# Patient Record
Sex: FEMALE | Race: WHITE | NOT HISPANIC OR LATINO | Employment: FULL TIME | URBAN - METROPOLITAN AREA
[De-identification: names, ages, dates, MRNs, and addresses within clinical notes are randomized per-mention and may not be internally consistent; named-entity substitution may affect disease eponyms.]

---

## 2017-02-21 ENCOUNTER — GENERIC CONVERSION - ENCOUNTER (OUTPATIENT)
Dept: OTHER | Facility: OTHER | Age: 67
End: 2017-02-21

## 2017-06-20 ENCOUNTER — ALLSCRIPTS OFFICE VISIT (OUTPATIENT)
Dept: OTHER | Facility: OTHER | Age: 67
End: 2017-06-20

## 2017-06-20 ENCOUNTER — APPOINTMENT (OUTPATIENT)
Dept: LAB | Facility: CLINIC | Age: 67
End: 2017-06-20
Payer: COMMERCIAL

## 2017-06-20 DIAGNOSIS — E78.5 HYPERLIPIDEMIA: ICD-10-CM

## 2017-06-20 DIAGNOSIS — R74.01 NONSPECIFIC ELEVATION OF LEVELS OF TRANSAMINASE AND LACTIC ACID DEHYDROGENASE (LDH): ICD-10-CM

## 2017-06-20 DIAGNOSIS — E83.52 HYPERCALCEMIA: ICD-10-CM

## 2017-06-20 DIAGNOSIS — D47.2 MONOCLONAL GAMMOPATHY: ICD-10-CM

## 2017-06-20 DIAGNOSIS — R74.02 NONSPECIFIC ELEVATION OF LEVELS OF TRANSAMINASE AND LACTIC ACID DEHYDROGENASE (LDH): ICD-10-CM

## 2017-06-20 LAB
ALBUMIN SERPL BCP-MCNC: 4 G/DL (ref 3.5–5)
ALP SERPL-CCNC: 46 U/L (ref 46–116)
ALT SERPL W P-5'-P-CCNC: 31 U/L (ref 12–78)
ANION GAP SERPL CALCULATED.3IONS-SCNC: 5 MMOL/L (ref 4–13)
AST SERPL W P-5'-P-CCNC: 52 U/L (ref 5–45)
BASOPHILS # BLD AUTO: 0.02 THOUSANDS/ΜL (ref 0–0.1)
BASOPHILS NFR BLD AUTO: 0 % (ref 0–1)
BILIRUB SERPL-MCNC: 0.44 MG/DL (ref 0.2–1)
BILIRUB UR QL STRIP: NEGATIVE
BUN SERPL-MCNC: 15 MG/DL (ref 5–25)
CALCIUM ALBUM COR SERPL-MCNC: 10.3 MG/DL (ref 8.3–10.1)
CALCIUM SERPL-MCNC: 10.3 MG/DL (ref 8.3–10.1)
CHLORIDE SERPL-SCNC: 105 MMOL/L (ref 100–108)
CHOLEST SERPL-MCNC: 186 MG/DL (ref 50–200)
CLARITY UR: NORMAL
CO2 SERPL-SCNC: 33 MMOL/L (ref 21–32)
COLOR UR: YELLOW
CREAT SERPL-MCNC: 0.7 MG/DL (ref 0.6–1.3)
EOSINOPHIL # BLD AUTO: 0.08 THOUSAND/ΜL (ref 0–0.61)
EOSINOPHIL NFR BLD AUTO: 2 % (ref 0–6)
ERYTHROCYTE [DISTWIDTH] IN BLOOD BY AUTOMATED COUNT: 12.1 % (ref 11.6–15.1)
GFR SERPL CREATININE-BSD FRML MDRD: >60 ML/MIN/1.73SQ M
GLUCOSE SERPL-MCNC: 97 MG/DL (ref 65–140)
GLUCOSE UR STRIP-MCNC: NEGATIVE MG/DL
HCT VFR BLD AUTO: 40.6 % (ref 34.8–46.1)
HDLC SERPL-MCNC: 100 MG/DL (ref 40–60)
HGB BLD-MCNC: 13.6 G/DL (ref 11.5–15.4)
HGB UR QL STRIP.AUTO: NEGATIVE
KETONES UR STRIP-MCNC: NEGATIVE MG/DL
LEUKOCYTE ESTERASE UR QL STRIP: NEGATIVE
LYMPHOCYTES # BLD AUTO: 2.12 THOUSANDS/ΜL (ref 0.6–4.47)
LYMPHOCYTES NFR BLD AUTO: 40 % (ref 14–44)
MCH RBC QN AUTO: 31.5 PG (ref 26.8–34.3)
MCHC RBC AUTO-ENTMCNC: 33.5 G/DL (ref 31.4–37.4)
MCV RBC AUTO: 94 FL (ref 82–98)
MONOCYTES # BLD AUTO: 0.38 THOUSAND/ΜL (ref 0.17–1.22)
MONOCYTES NFR BLD AUTO: 7 % (ref 4–12)
NEUTROPHILS # BLD AUTO: 2.72 THOUSANDS/ΜL (ref 1.85–7.62)
NEUTS SEG NFR BLD AUTO: 51 % (ref 43–75)
NITRITE UR QL STRIP: NEGATIVE
NONHDLC SERPL-MCNC: 86 MG/DL
NRBC BLD AUTO-RTO: 0 /100 WBCS
PH UR STRIP.AUTO: 7 [PH] (ref 4.5–8)
PLATELET # BLD AUTO: 273 THOUSANDS/UL (ref 149–390)
PMV BLD AUTO: 10.8 FL (ref 8.9–12.7)
POTASSIUM SERPL-SCNC: 3.6 MMOL/L (ref 3.5–5.3)
PROT SERPL-MCNC: 7.5 G/DL (ref 6.4–8.2)
PROT UR STRIP-MCNC: NEGATIVE MG/DL
RBC # BLD AUTO: 4.32 MILLION/UL (ref 3.81–5.12)
SODIUM SERPL-SCNC: 143 MMOL/L (ref 136–145)
SP GR UR STRIP.AUTO: 1.01 (ref 1–1.03)
TSH SERPL DL<=0.05 MIU/L-ACNC: 1.5 UIU/ML (ref 0.36–3.74)
UROBILINOGEN UR QL STRIP.AUTO: 0.2 E.U./DL
WBC # BLD AUTO: 5.32 THOUSAND/UL (ref 4.31–10.16)

## 2017-06-20 PROCEDURE — 84443 ASSAY THYROID STIM HORMONE: CPT

## 2017-06-20 PROCEDURE — 82465 ASSAY BLD/SERUM CHOLESTEROL: CPT

## 2017-06-20 PROCEDURE — 85025 COMPLETE CBC W/AUTO DIFF WBC: CPT

## 2017-06-20 PROCEDURE — 36415 COLL VENOUS BLD VENIPUNCTURE: CPT

## 2017-06-20 PROCEDURE — 83718 ASSAY OF LIPOPROTEIN: CPT

## 2017-06-20 PROCEDURE — 81003 URINALYSIS AUTO W/O SCOPE: CPT

## 2017-06-20 PROCEDURE — 80053 COMPREHEN METABOLIC PANEL: CPT

## 2017-06-23 ENCOUNTER — HOSPITAL ENCOUNTER (OUTPATIENT)
Dept: ULTRASOUND IMAGING | Facility: CLINIC | Age: 67
Discharge: HOME | End: 2017-06-23
Payer: COMMERCIAL

## 2017-06-23 DIAGNOSIS — R74.01 NONSPECIFIC ELEVATION OF LEVELS OF TRANSAMINASE AND LACTIC ACID DEHYDROGENASE (LDH): ICD-10-CM

## 2017-06-23 DIAGNOSIS — R74.02 NONSPECIFIC ELEVATION OF LEVELS OF TRANSAMINASE AND LACTIC ACID DEHYDROGENASE (LDH): ICD-10-CM

## 2017-06-23 PROCEDURE — 76700 US EXAM ABDOM COMPLETE: CPT

## 2017-06-27 ENCOUNTER — GENERIC CONVERSION - ENCOUNTER (OUTPATIENT)
Dept: OTHER | Facility: OTHER | Age: 67
End: 2017-06-27

## 2017-07-07 ENCOUNTER — ALLSCRIPTS OFFICE VISIT (OUTPATIENT)
Dept: OTHER | Facility: OTHER | Age: 67
End: 2017-07-07

## 2017-07-07 ENCOUNTER — TRANSCRIBE ORDERS (OUTPATIENT)
Dept: LAB | Facility: CLINIC | Age: 67
End: 2017-07-07

## 2017-07-07 ENCOUNTER — APPOINTMENT (OUTPATIENT)
Dept: LAB | Facility: CLINIC | Age: 67
End: 2017-07-07
Payer: COMMERCIAL

## 2017-07-07 DIAGNOSIS — R74.02 NONSPECIFIC ELEVATION OF LEVELS OF TRANSAMINASE AND LACTIC ACID DEHYDROGENASE (LDH): ICD-10-CM

## 2017-07-07 DIAGNOSIS — R74.01 NONSPECIFIC ELEVATION OF LEVELS OF TRANSAMINASE AND LACTIC ACID DEHYDROGENASE (LDH): ICD-10-CM

## 2017-07-07 DIAGNOSIS — E83.52 HYPERCALCEMIA: ICD-10-CM

## 2017-07-07 LAB
ALBUMIN SERPL BCP-MCNC: 3.7 G/DL (ref 3.5–5)
ALP SERPL-CCNC: 49 U/L (ref 46–116)
ALT SERPL W P-5'-P-CCNC: 30 U/L (ref 12–78)
ANION GAP SERPL CALCULATED.3IONS-SCNC: 3 MMOL/L (ref 4–13)
AST SERPL W P-5'-P-CCNC: 50 U/L (ref 5–45)
BILIRUB SERPL-MCNC: 0.36 MG/DL (ref 0.2–1)
BUN SERPL-MCNC: 13 MG/DL (ref 5–25)
CA-I BLD-SCNC: 1.25 MMOL/L (ref 1.12–1.32)
CALCIUM SERPL-MCNC: 9.5 MG/DL (ref 8.3–10.1)
CHLORIDE SERPL-SCNC: 105 MMOL/L (ref 100–108)
CO2 SERPL-SCNC: 32 MMOL/L (ref 21–32)
CREAT SERPL-MCNC: 0.79 MG/DL (ref 0.6–1.3)
ERYTHROCYTE [SEDIMENTATION RATE] IN BLOOD: 23 MM/HOUR (ref 0–20)
FERRITIN SERPL-MCNC: 44 NG/ML (ref 8–388)
GFR SERPL CREATININE-BSD FRML MDRD: >60 ML/MIN/1.73SQ M
GLUCOSE P FAST SERPL-MCNC: 98 MG/DL (ref 65–99)
IGA SERPL-MCNC: 199 MG/DL (ref 70–400)
IGG SERPL-MCNC: 978 MG/DL (ref 700–1600)
IGM SERPL-MCNC: 88 MG/DL (ref 40–230)
POTASSIUM SERPL-SCNC: 4.1 MMOL/L (ref 3.5–5.3)
PROT SERPL-MCNC: 7 G/DL (ref 6.4–8.2)
PTH-INTACT SERPL-MCNC: 9.1 PG/ML (ref 14–72)
SODIUM SERPL-SCNC: 140 MMOL/L (ref 136–145)

## 2017-07-07 PROCEDURE — 82330 ASSAY OF CALCIUM: CPT

## 2017-07-07 PROCEDURE — 87340 HEPATITIS B SURFACE AG IA: CPT

## 2017-07-07 PROCEDURE — 80053 COMPREHEN METABOLIC PANEL: CPT

## 2017-07-07 PROCEDURE — 86803 HEPATITIS C AB TEST: CPT

## 2017-07-07 PROCEDURE — 82728 ASSAY OF FERRITIN: CPT

## 2017-07-07 PROCEDURE — 82784 ASSAY IGA/IGD/IGG/IGM EACH: CPT

## 2017-07-07 PROCEDURE — 82390 ASSAY OF CERULOPLASMIN: CPT

## 2017-07-07 PROCEDURE — 36415 COLL VENOUS BLD VENIPUNCTURE: CPT

## 2017-07-07 PROCEDURE — 85652 RBC SED RATE AUTOMATED: CPT

## 2017-07-07 PROCEDURE — 83970 ASSAY OF PARATHORMONE: CPT

## 2017-07-07 PROCEDURE — 86334 IMMUNOFIX E-PHORESIS SERUM: CPT

## 2017-07-07 PROCEDURE — 84165 PROTEIN E-PHORESIS SERUM: CPT

## 2017-07-09 LAB
CERULOPLASMIN SERPL-MCNC: 22.3 MG/DL (ref 19–39)
HBV SURFACE AG SER QL: NORMAL
HCV AB SER QL: NORMAL

## 2017-07-10 LAB
ALBUMIN SERPL ELPH-MCNC: 4.24 G/DL (ref 3.5–5)
ALBUMIN SERPL ELPH-MCNC: 61.5 % (ref 52–65)
ALPHA1 GLOB SERPL ELPH-MCNC: 0.31 G/DL (ref 0.1–0.4)
ALPHA1 GLOB SERPL ELPH-MCNC: 4.5 % (ref 2.5–5)
ALPHA2 GLOB SERPL ELPH-MCNC: 0.68 G/DL (ref 0.4–1.2)
ALPHA2 GLOB SERPL ELPH-MCNC: 9.9 % (ref 7–13)
BETA GLOB ABNORMAL SERPL ELPH-MCNC: 0.36 G/DL (ref 0.4–0.8)
BETA1 GLOB SERPL ELPH-MCNC: 5.2 % (ref 5–13)
BETA2 GLOB SERPL ELPH-MCNC: 5.6 % (ref 2–8)
BETA2+GAMMA GLOB SERPL ELPH-MCNC: 0.39 G/DL (ref 0.2–0.5)
GAMMA GLOB ABNORMAL SERPL ELPH-MCNC: 0.92 G/DL (ref 0.5–1.6)
GAMMA GLOB SERPL ELPH-MCNC: 13.3 % (ref 12–22)
IGG/ALB SER: 1.6 {RATIO} (ref 1.1–1.8)
INTERPRETATION UR IFE-IMP: NORMAL
M PROTEIN 1 MFR SERPL ELPH: 2.6 %
M PROTEIN 1 SERPL ELPH-MCNC: 0.18 G/DL
PROT SERPL-MCNC: 6.9 G/DL (ref 6.4–8.2)

## 2017-07-14 ENCOUNTER — ALLSCRIPTS OFFICE VISIT (OUTPATIENT)
Dept: OTHER | Facility: OTHER | Age: 67
End: 2017-07-14

## 2017-07-17 ENCOUNTER — TRANSCRIBE ORDERS (OUTPATIENT)
Dept: ADMINISTRATIVE | Facility: HOSPITAL | Age: 67
End: 2017-07-17

## 2017-07-17 ENCOUNTER — HOSPITAL ENCOUNTER (OUTPATIENT)
Dept: RADIOLOGY | Facility: HOSPITAL | Age: 67
Discharge: HOME | End: 2017-07-17
Payer: COMMERCIAL

## 2017-07-17 DIAGNOSIS — D47.2 MONOCLONAL GAMMOPATHY: ICD-10-CM

## 2017-07-17 PROCEDURE — 77075 RADEX OSSEOUS SURVEY COMPL: CPT

## 2017-07-21 ENCOUNTER — HOSPITAL ENCOUNTER (OUTPATIENT)
Dept: RADIOLOGY | Facility: HOSPITAL | Age: 67
Discharge: HOME | End: 2017-07-21
Payer: COMMERCIAL

## 2017-07-21 DIAGNOSIS — D47.2 MONOCLONAL GAMMOPATHY: ICD-10-CM

## 2017-07-21 PROCEDURE — 78306 BONE IMAGING WHOLE BODY: CPT

## 2017-07-21 PROCEDURE — A9503 TC99M MEDRONATE: HCPCS

## 2017-07-31 ENCOUNTER — ALLSCRIPTS OFFICE VISIT (OUTPATIENT)
Dept: OTHER | Facility: OTHER | Age: 67
End: 2017-07-31

## 2017-08-04 ENCOUNTER — ALLSCRIPTS OFFICE VISIT (OUTPATIENT)
Dept: OTHER | Facility: OTHER | Age: 67
End: 2017-08-04

## 2017-08-16 ENCOUNTER — ALLSCRIPTS OFFICE VISIT (OUTPATIENT)
Dept: OTHER | Facility: OTHER | Age: 67
End: 2017-08-16

## 2018-01-12 VITALS
WEIGHT: 102 LBS | OXYGEN SATURATION: 97 % | DIASTOLIC BLOOD PRESSURE: 64 MMHG | SYSTOLIC BLOOD PRESSURE: 110 MMHG | HEIGHT: 63 IN | TEMPERATURE: 98.2 F | HEART RATE: 66 BPM | BODY MASS INDEX: 18.07 KG/M2

## 2018-01-12 VITALS
WEIGHT: 104.5 LBS | SYSTOLIC BLOOD PRESSURE: 112 MMHG | BODY MASS INDEX: 18.52 KG/M2 | HEIGHT: 63 IN | HEART RATE: 80 BPM | DIASTOLIC BLOOD PRESSURE: 70 MMHG | OXYGEN SATURATION: 97 %

## 2018-01-14 VITALS
DIASTOLIC BLOOD PRESSURE: 78 MMHG | WEIGHT: 102 LBS | HEIGHT: 61 IN | BODY MASS INDEX: 19.26 KG/M2 | HEART RATE: 63 BPM | SYSTOLIC BLOOD PRESSURE: 118 MMHG | TEMPERATURE: 98.2 F | OXYGEN SATURATION: 97 % | RESPIRATION RATE: 14 BRPM

## 2018-01-14 VITALS
BODY MASS INDEX: 18.52 KG/M2 | WEIGHT: 104.5 LBS | HEIGHT: 63 IN | OXYGEN SATURATION: 96 % | SYSTOLIC BLOOD PRESSURE: 110 MMHG | DIASTOLIC BLOOD PRESSURE: 74 MMHG | HEART RATE: 65 BPM

## 2018-01-16 NOTE — PROGRESS NOTES
Assessment    1  Encounter for preventive health examination (V70 0) (Z00 00)    Plan  Hyperlipidemia    · (1) CBC/PLT/DIFF; Status:Active; Requested SRZ:[id number];    · (1) COMPREHENSIVE METABOLIC PANEL; Status:Active; Requested EGI:[id number];    · (1) LIPID PANEL, NON FASTING W/O TRIG; Status:Active; Requested VHV:[id number];    · (1) TSH WITH FT4 REFLEX; Status:Active; Requested XGF:[id number];    · (1) URINALYSIS (will reflex a microscopy if leukocytes, occult blood, protein or nitrites  are not within normal limits); Status:Active; Requested HGT:[id number];     Discussion/Summary  health maintenance visit Currently, she eats a healthy diet  cervical cancer screening is needed every year Breast cancer screening: mammogram is needed every two years  Colorectal cancer screening: colorectal cancer screening is current  Osteoporosis screening: bone mineral density testing is current  Screening lab work includes hemoglobin, glucose, lipid profile and thyroid function testing  The risks and benefits of immunizations were discussed  Problem appears stable    Laboratory testing today  Followup in a year  Dexa to be done 11/2017  History of Present Illness  HM, Adult Female: The patient is being seen for a health maintenance evaluation  The last health maintenance visit was 3 year(s) ago  Social History: Household members include spouse and 1 daughter(s)  She is   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  The patient has never smoked cigarettes  She reports rare alcohol use  She has never used illicit drugs  General Health: The patient's health since the last visit is described as good  Lifestyle:  She consumes a diverse and healthy diet  She does not have any weight concerns  She exercises regularly  She does not use tobacco  She denies alcohol use  She denies drug use     Reproductive health: the patient is postmenopausal    Screening: cancer screening reviewed and updated  metabolic screening reviewed and updated  risk screening reviewed and updated  HPI: Chronic problems include osteoarthritis, osteoporosis, and hyperlipidemia  A minimal 15 year history of intermittent right occipital headache radiating to the temple   Occurring more than 10 times a month  A headache specialist suggested Amitryptiline preventive but it makes her groggy  Osteoporosis is treated by Dr Cheryl Sandoval; she gets a Boniva injection   Osteoarthritic pain is at a stable baseline       Osteoporosis (Follow-Up): The patient's osteoporosis has been stable since the last visit  She has no comorbid illnesses  She has no complications  She has no significant interval events  Symptoms: The patient is currently asymptomatic  Associated symptoms include no decrease in height  Home precautions: Osteoporosis counseling and education was reviewed with patient and caregivers, including prevention of falls  Medications: the patient is adherent with her medication regimen  Medication(s): vitamin D and PTH therapy  Hyperlipidemia (Follow-Up): The patient states her hyperlipidemia has been stable since the last visit  She has no comorbid illnesses  Symptoms: The patient is currently asymptomatic  Medications: The patient is not currently on any medications for her hyperlipidemia  Arthritis / Joint Pain (Brief): The patient is being seen for a routine clinic follow-up of joint pain  The patient presents with complaints of gradual onset of frequent episodes of moderate bilateral knee joint pain  Episodes started about 5 years ago  Her symptoms are caused by no known event  Symptoms are made worse by direct pressure and weight bearing  Symptoms are unchanged  Pertinent Medical History: osteoporosis and osteoarthritis  Headache (Brief): The patient is being seen for a routine clinic follow-up of headaches     The patient presents with complaints of sudden onset of episodes of moderate always unilateral and right occipital headache, radiating to the right temporal area  Episodes have been occurring 4-5 times a month starting about 10 years ago, each episode lasting 1 hour  Her symptoms are caused by no known event  Symptoms are improved by rest and darkened room  Symptoms are unchanged  Review of Systems    Constitutional: no fever and no chills  Eyes: no eyesight problems  ENT: no nosebleeds and no nasal discharge  Cardiovascular: no chest pain and no palpitations  Respiratory: no shortness of breath and no cough  Gastrointestinal: no abdominal pain, no nausea, no vomiting and no diarrhea  Musculoskeletal: arthralgias and joint stiffness  Integumentary: no rashes and no itching  Neurological: as noted in HPI  Psychiatric: no anxiety and no depression  Endocrine: no muscle weakness  Hematologic/Lymphatic: no tendency for easy bleeding and no tendency for easy bruising  Active Problems    1  Arthritis (716 90) (M19 90)   2  Hyperlipidemia (272 4) (E78 5)   3  Migraine (346 90) (G43 909)   4  Osteoporosis (733 00) (M81 0)    Past Medical History    The active problems and past medical history were reviewed and updated today  Surgical History    · History of Complete Colonoscopy    The surgical history was reviewed and updated today  Family History  Family History    · No pertinent family history    Social History    ·    · Minimum alcohol consumption   · Never a smoker   · No drug use    Current Meds   1  Aspirin 81 MG TABS; Therapy: ([phone]) to Recorded   2  Atorvastatin Calcium 20 MG Oral Tablet; Take 1 tablet daily; Therapy: [id number] to (Evaluate:14Apr2017)  Requested for: [id number]; Last   Rx:19Apr2016; Status: ACTIVE - Renewal Denied Ordered   3  Calcium 600-D TABS; Therapy: (105 185 272) to Recorded   4  Multi-Vitamins TABS; Therapy: ([phone]) to Recorded   5   SUMAtriptan Succinate 50 MG Oral Tablet; One tab by mouth stat at the onset of a   headache; Therapy: [id number] to (Evaluate:18Jun2015)  Requested for: 84QGX6188; Last   Rx:08Jun2015 Ordered   6  Zoledronic Acid 5 MG/100ML Intravenous Solution; INFUSE 5MG INTRAVENOUSLY   OVER 15 MINUTES YEARLY; Therapy: [id number] to (Evaluate:01Jan2015); Last Rx:[id number] Ordered    Allergies    1  No Known Drug Allergies    2  Dust   3  Pollen   4  Seasonal    Vitals   Recorded: 20Jun2017 01:03PM   Heart Rate 60   Systolic 372   Diastolic 80   Height 5 ft 3 in   Weight 104 lb    BMI Calculated 18 42   BSA Calculated 1 46   O2 Saturation 98     Physical Exam    Constitutional   General appearance: No acute distress, well appearing and well nourished  Eyes   Conjunctiva and lids: No swelling, erythema or discharge  Pupils and irises: Equal, round and reactive to light  Ears, Nose, Mouth, and Throat   External inspection of ears and nose: Normal     Otoscopic examination: Tympanic membranes translucent with normal light reflex  Canals patent without erythema  Nasal mucosa, septum, and turbinates: Normal without edema or erythema  Oropharynx: Normal with no erythema, edema, exudate or lesions  Pulmonary   Respiratory effort: No increased work of breathing or signs of respiratory distress  Auscultation of lungs: Clear to auscultation  Cardiovascular   Auscultation of heart: Normal rate and rhythm, normal S1 and S2, without murmurs  Examination of extremities for edema and/or varicosities: Normal     Carotid pulses: Normal     Abdomen   Abdomen: Non-tender, no masses  Liver and spleen: No hepatomegaly or splenomegaly  Lymphatic   Palpation of lymph nodes in neck: No lymphadenopathy  Musculoskeletal   Gait and station: Normal     Digits and nails: Normal without clubbing or cyanosis  Inspection/palpation of joints, bones, and muscles: Normal     Skin   Skin and subcutaneous tissue: Normal without rashes or lesions  Neurologic   Cranial nerves: Cranial nerves 2-12 intact  Reflexes: 2+ and symmetric  Sensation: No sensory loss  Psychiatric   Orientation to person, place, and time: Normal     Mood and affect: Normal          Results/Data  PHQ-2 Adult Depression Screening 20Jun2017 01:14PM Carmen Horton     Test Name Result Flag Reference   PHQ-2 Adult Depression Score 0     Over the last two weeks, how often have you been bothered by any of the following problems?   Little interest or pleasure in doing things: Not at all - 0  Feeling down, depressed, or hopeless: Not at all - 0   PHQ-2 Adult Depression Screening Negative         Signatures   Electronically signed by : KATHY Jones ; Jun 20 2017  1:20PM EST                       (Author)

## 2018-01-18 NOTE — RESULT NOTES
Verified Results  * US ABDOMEN COMPLETE [id number] 09:55AM Jessica Rangel Order Number: [id number]    - Patient Instructions: To schedule this appointment, please contact Central Scheduling at [phone]  Test Name Result Flag Reference   US ABDOMEN COMPLETE (Report)     ABDOMEN ULTRASOUND, COMPLETE     INDICATION: Elevated transaminase and LDH  COMPARISON: None  TECHNIQUE:  Real-time ultrasound of the abdomen was performed with a curvilinear transducer with both volumetric sweeps and still imaging techniques  FINDINGS:     PANCREAS: Visualized portions of the pancreas are within normal limits  AORTA AND IVC: Visualized portions are normal for patient age  LIVER:   Size: Within normal range  The liver measures 17 cm in the midclavicular line  Contour: Surface contour is smooth  Parenchyma: Background parenchymal echotexture is normal    Multiple simple cysts scattered throughout both lobes of liver, the largest measuring 2 2 cm  Some of the cysts are thinly septated  No concerning hepatic masses  The main portal vein is patent and hepatopetal       BILIARY:   The gallbladder is normal in caliber  No wall thickening or pericholecystic fluid  No stones or sludge identified  Sonographic Brenna Getting sign is negative  No intrahepatic biliary dilatation  CBD measures 3 mm  No choledocholithiasis  KIDNEY:    Right kidney measures 10 6 cm  Within normal limits  Left kidney measures 9 8 cm  Within normal limits  SPLEEN:    Measures 9 7 cm  Within normal limits  ASCITES: None  IMPRESSION:     Small hepatic cysts without concerning hepatic masses  Otherwise, normal abdominal ultrasound         Workstation performed: [id number]     Signed by:   Jasen Sarkar MD   6/27/17

## 2018-01-22 VITALS
HEIGHT: 63 IN | DIASTOLIC BLOOD PRESSURE: 80 MMHG | BODY MASS INDEX: 18.43 KG/M2 | SYSTOLIC BLOOD PRESSURE: 124 MMHG | HEART RATE: 60 BPM | WEIGHT: 104 LBS | OXYGEN SATURATION: 98 %

## 2018-01-24 ENCOUNTER — TELEPHONE (OUTPATIENT)
Dept: INTERNAL MEDICINE CLINIC | Facility: CLINIC | Age: 68
End: 2018-01-24

## 2018-02-02 DIAGNOSIS — D47.2 MONOCLONAL GAMMOPATHY: ICD-10-CM

## 2018-03-21 ENCOUNTER — APPOINTMENT (OUTPATIENT)
Dept: LAB | Facility: CLINIC | Age: 68
End: 2018-03-21
Payer: COMMERCIAL

## 2018-03-21 DIAGNOSIS — D47.2 MONOCLONAL GAMMOPATHY: ICD-10-CM

## 2018-03-21 LAB
ALBUMIN SERPL BCP-MCNC: 3.8 G/DL (ref 3.5–5)
ALP SERPL-CCNC: 54 U/L (ref 46–116)
ALT SERPL W P-5'-P-CCNC: 53 U/L (ref 12–78)
ANION GAP SERPL CALCULATED.3IONS-SCNC: 3 MMOL/L (ref 4–13)
AST SERPL W P-5'-P-CCNC: 71 U/L (ref 5–45)
BASOPHILS # BLD AUTO: 0.03 THOUSANDS/ΜL (ref 0–0.1)
BASOPHILS NFR BLD AUTO: 1 % (ref 0–1)
BILIRUB SERPL-MCNC: 0.55 MG/DL (ref 0.2–1)
BUN SERPL-MCNC: 14 MG/DL (ref 5–25)
CALCIUM SERPL-MCNC: 9.2 MG/DL (ref 8.3–10.1)
CHLORIDE SERPL-SCNC: 104 MMOL/L (ref 100–108)
CO2 SERPL-SCNC: 33 MMOL/L (ref 21–32)
CREAT SERPL-MCNC: 0.85 MG/DL (ref 0.6–1.3)
EOSINOPHIL # BLD AUTO: 0.18 THOUSAND/ΜL (ref 0–0.61)
EOSINOPHIL NFR BLD AUTO: 4 % (ref 0–6)
ERYTHROCYTE [DISTWIDTH] IN BLOOD BY AUTOMATED COUNT: 11.7 % (ref 11.6–15.1)
GFR SERPL CREATININE-BSD FRML MDRD: 72 ML/MIN/1.73SQ M
GLUCOSE P FAST SERPL-MCNC: 79 MG/DL (ref 65–99)
HCT VFR BLD AUTO: 41.6 % (ref 34.8–46.1)
HGB BLD-MCNC: 14.1 G/DL (ref 11.5–15.4)
IGA SERPL-MCNC: 204 MG/DL (ref 70–400)
IGG SERPL-MCNC: 1030 MG/DL (ref 700–1600)
IGM SERPL-MCNC: 94 MG/DL (ref 40–230)
LYMPHOCYTES # BLD AUTO: 1.92 THOUSANDS/ΜL (ref 0.6–4.47)
LYMPHOCYTES NFR BLD AUTO: 39 % (ref 14–44)
MCH RBC QN AUTO: 32 PG (ref 26.8–34.3)
MCHC RBC AUTO-ENTMCNC: 33.9 G/DL (ref 31.4–37.4)
MCV RBC AUTO: 94 FL (ref 82–98)
MONOCYTES # BLD AUTO: 0.39 THOUSAND/ΜL (ref 0.17–1.22)
MONOCYTES NFR BLD AUTO: 8 % (ref 4–12)
NEUTROPHILS # BLD AUTO: 2.35 THOUSANDS/ΜL (ref 1.85–7.62)
NEUTS SEG NFR BLD AUTO: 48 % (ref 43–75)
NRBC BLD AUTO-RTO: 0 /100 WBCS
PLATELET # BLD AUTO: 274 THOUSANDS/UL (ref 149–390)
PMV BLD AUTO: 10.5 FL (ref 8.9–12.7)
POTASSIUM SERPL-SCNC: 4.2 MMOL/L (ref 3.5–5.3)
PROT SERPL-MCNC: 7.5 G/DL (ref 6.4–8.2)
RBC # BLD AUTO: 4.41 MILLION/UL (ref 3.81–5.12)
SODIUM SERPL-SCNC: 140 MMOL/L (ref 136–145)
WBC # BLD AUTO: 4.87 THOUSAND/UL (ref 4.31–10.16)

## 2018-03-21 PROCEDURE — 85025 COMPLETE CBC W/AUTO DIFF WBC: CPT

## 2018-03-21 PROCEDURE — 82784 ASSAY IGA/IGD/IGG/IGM EACH: CPT

## 2018-03-21 PROCEDURE — 36415 COLL VENOUS BLD VENIPUNCTURE: CPT

## 2018-03-21 PROCEDURE — 84165 PROTEIN E-PHORESIS SERUM: CPT

## 2018-03-21 PROCEDURE — 80053 COMPREHEN METABOLIC PANEL: CPT

## 2018-03-21 PROCEDURE — 83883 ASSAY NEPHELOMETRY NOT SPEC: CPT

## 2018-03-21 PROCEDURE — 84165 PROTEIN E-PHORESIS SERUM: CPT | Performed by: PATHOLOGY

## 2018-03-22 LAB
KAPPA LC FREE SER-MCNC: 14.4 MG/L (ref 3.3–19.4)
KAPPA LC FREE/LAMBDA FREE SER: 1.04 {RATIO} (ref 0.26–1.65)
LAMBDA LC FREE SERPL-MCNC: 13.8 MG/L (ref 5.7–26.3)

## 2018-03-24 LAB
ALBUMIN SERPL ELPH-MCNC: 4.36 G/DL (ref 3.5–5)
ALBUMIN SERPL ELPH-MCNC: 61.4 % (ref 52–65)
ALPHA1 GLOB SERPL ELPH-MCNC: 0.28 G/DL (ref 0.1–0.4)
ALPHA1 GLOB SERPL ELPH-MCNC: 3.9 % (ref 2.5–5)
ALPHA2 GLOB SERPL ELPH-MCNC: 0.67 G/DL (ref 0.4–1.2)
ALPHA2 GLOB SERPL ELPH-MCNC: 9.4 % (ref 7–13)
BETA GLOB ABNORMAL SERPL ELPH-MCNC: 0.4 G/DL (ref 0.4–0.8)
BETA1 GLOB SERPL ELPH-MCNC: 5.7 % (ref 5–13)
BETA2 GLOB SERPL ELPH-MCNC: 5.5 % (ref 2–8)
BETA2+GAMMA GLOB SERPL ELPH-MCNC: 0.39 G/DL (ref 0.2–0.5)
GAMMA GLOB ABNORMAL SERPL ELPH-MCNC: 1 G/DL (ref 0.5–1.6)
GAMMA GLOB SERPL ELPH-MCNC: 14.1 % (ref 12–22)
IGG/ALB SER: 1.59 {RATIO} (ref 1.1–1.8)
M PROTEIN 1 MFR SERPL ELPH: 2.3 %
M PROTEIN 1 SERPL ELPH-MCNC: 0.16 G/DL
PROT SERPL-MCNC: 7.1 G/DL (ref 6.4–8.2)

## 2018-04-17 ENCOUNTER — OFFICE VISIT (OUTPATIENT)
Dept: HEMATOLOGY ONCOLOGY | Facility: CLINIC | Age: 68
End: 2018-04-17
Payer: COMMERCIAL

## 2018-04-17 VITALS
HEART RATE: 58 BPM | WEIGHT: 107 LBS | TEMPERATURE: 97.9 F | OXYGEN SATURATION: 97 % | RESPIRATION RATE: 16 BRPM | HEIGHT: 55 IN | SYSTOLIC BLOOD PRESSURE: 106 MMHG | DIASTOLIC BLOOD PRESSURE: 72 MMHG | BODY MASS INDEX: 24.76 KG/M2

## 2018-04-17 DIAGNOSIS — D47.2 MGUS (MONOCLONAL GAMMOPATHY OF UNKNOWN SIGNIFICANCE): Primary | ICD-10-CM

## 2018-04-17 PROCEDURE — 99214 OFFICE O/P EST MOD 30 MIN: CPT | Performed by: INTERNAL MEDICINE

## 2018-04-17 RX ORDER — OLOPATADINE HYDROCHLORIDE 2 MG/ML
SOLUTION/ DROPS OPHTHALMIC
COMMUNITY
Start: 2017-05-01 | End: 2022-07-12 | Stop reason: ALTCHOICE

## 2018-04-17 RX ORDER — DIPHENOXYLATE HYDROCHLORIDE AND ATROPINE SULFATE 2.5; .025 MG/1; MG/1
TABLET ORAL
COMMUNITY

## 2018-04-17 RX ORDER — SUMATRIPTAN 100 MG/1
TABLET, FILM COATED ORAL
Refills: 5 | COMMUNITY
Start: 2018-04-08 | End: 2020-08-19 | Stop reason: SDUPTHER

## 2018-04-17 RX ORDER — B-COMPLEX WITH VITAMIN C
TABLET ORAL
COMMUNITY

## 2018-04-17 RX ORDER — ATORVASTATIN CALCIUM 20 MG/1
20 TABLET, FILM COATED ORAL DAILY
Refills: 3 | COMMUNITY
Start: 2018-03-29 | End: 2018-09-22 | Stop reason: SDUPTHER

## 2018-04-17 NOTE — PROGRESS NOTES
Hematology/Oncology Outpatient Follow- up Note  Jamel Runner 74 y o  female MRN: @ Encounter: [id number]        Date:  4/17/2018    Presenting Complaint/Diagnosis : MGUS      Previous Hematologic/ Oncologic History:      Workup    Current Hematologic/ Oncologic Treatment:      Workup    Interval History:      The patient returns for follow-up visit  In the past she had a multiple myeloma workup which was negative  She has a small M spike of 0 18  I advised her I would just follow her every 6 months or so  Her most recent blood work shows no anemia  Her kidney function is normal  Immunoglobulins are normal  She still has a small M spike  Otherwise her blood work shows no evidence of multiple myeloma  Skeletal survey in July 2017 was negative  The patient is at baseline  Denies any nausea denies any vomiting denies any diarrhea  The rest of her 14 point review of systems today was negative  Test Results:    Imaging: No results found  Labs:   Lab Results   Component Value Date    WBC 4 87 03/21/2018    HGB 14 1 03/21/2018    HCT 41 6 03/21/2018    MCV 94 03/21/2018     03/21/2018     Lab Results   Component Value Date     03/21/2018    K 4 2 03/21/2018     03/21/2018    CO2 33 (H) 03/21/2018    ANIONGAP 3 (L) 03/21/2018    BUN 14 03/21/2018    CREATININE 0 85 03/21/2018    GLUCOSE 97 06/20/2017    GLUF 79 03/21/2018    CALCIUM 9 2 03/21/2018    CORRECTEDCA 10 3 (H) 06/20/2017    AST 71 (H) 03/21/2018    ALT 53 03/21/2018    ALKPHOS 54 03/21/2018    PROT 7 5 03/21/2018    PROT 7 1 03/21/2018    BILITOT 0 55 03/21/2018    EGFR 72 03/21/2018         Lab Results   Component Value Date    SPEP  03/21/2018     The serum total protein and albumin are within normal limits  The serum protein electrophoresis shows a small monoclonal peak in the gamma region  Previous immunofixation on 7/07/17 identified a monoclonal band as IgG kappa  Reviewed by: America Jorgensen MD ([id number]) **Electronic Signature**         Lab Results   Component Value Date    FERRITIN 44 07/07/2017       No results found for: Liliana Garay      ROS: As stated in the history of present illness otherwise his 14 point review of systems today was negative  Active Problems: There is no problem list on file for this patient  Past Medical History: No past medical history on file  Surgical History: No past surgical history on file  Family History:  No family history on file  Cancer-related family history is not on file  Social History:   Social History     Social History    Marital status: /Civil Union     Spouse name: N/A    Number of children: N/A    Years of education: N/A     Occupational History    Not on file  Social History Main Topics    Smoking status: Not on file    Smokeless tobacco: Not on file    Alcohol use Not on file    Drug use: Unknown    Sexual activity: Not on file     Other Topics Concern    Not on file     Social History Narrative    No narrative on file       Current Medications:   Current Outpatient Prescriptions   Medication Sig Dispense Refill    aspirin 81 MG tablet Take by mouth      atorvastatin (LIPITOR) 20 mg tablet Take 20 mg by mouth daily  3    Calcium Carbonate-Vitamin D (CALCIUM 600+D) 600-200 MG-UNIT TABS Take by mouth      multivitamin (THERAGRAN) TABS Take by mouth      olopatadine HCl (PATADAY) 0 2 % opth drops INSTILL 1 DROP INTO AFFECTED EYES DAILY      SUMAtriptan (IMITREX) 100 mg tablet TAKE 1 TABLET BY MOUTH ONCE DAILY AS NEEDED MAY REPEAT DOSE IN 2 HOURS   5     No current facility-administered medications for this visit  Allergies: Allergies   Allergen Reactions    Dust Mite Extract     Pollen Extract        Physical Exam:    Body surface area is 0 24 meters squared      Wt Readings from Last 3 Encounters:   04/17/18 48 5 kg (107 lb)   08/04/17 46 3 kg (102 lb)   07/31/17 46 3 kg (102 lb)        Temp Readings from Last 3 Encounters: 04/17/18 97 9 °F (36 6 °C) (Oral)   08/04/17 98 2 °F (36 8 °C)   07/31/17 98 2 °F (36 8 °C)        BP Readings from Last 3 Encounters:   04/17/18 106/72   08/04/17 118/78   07/31/17 110/64         Pulse Readings from Last 3 Encounters:   04/17/18 58   08/04/17 63   07/31/17 66     @LASTSAO2(3)@      Physical Exam     Constitutional   General appearance: No acute distress, well appearing and well nourished  Eyes   Conjunctiva and lids: No swelling, erythema or discharge  Pupils and irises: Equal, round and reactive to light  Ears, Nose, Mouth, and Throat   External inspection of ears and nose: Normal     Nasal mucosa, septum, and turbinates: Normal without edema or erythema  Oropharynx: Normal with no erythema, edema, exudate or lesions  Pulmonary   Respiratory effort: No increased work of breathing or signs of respiratory distress  Auscultation of lungs: Clear to auscultation  Cardiovascular   Palpation of heart: Normal PMI, no thrills  Auscultation of heart: Normal rate and rhythm, normal S1 and S2, without murmurs  Examination of extremities for edema and/or varicosities: Normal     Carotid pulses: Normal     Abdomen   Abdomen: Non-tender, no masses  Liver and spleen: No hepatomegaly or splenomegaly  Lymphatic   Palpation of lymph nodes in neck: No lymphadenopathy  Musculoskeletal   Gait and station: Normal     Digits and nails: Normal without clubbing or cyanosis  Inspection/palpation of joints, bones, and muscles: Normal     Skin   Skin and subcutaneous tissue: Normal without rashes or lesions  Neurologic   Cranial nerves: Cranial nerves 2-12 intact  Sensation: No sensory loss  Psychiatric   Orientation to person, place, and time: Normal     Mood and affect: Normal         Assessment / Plan: This is a pleasant 80-year-old female who was referred for small M spike  She has been on observation every 6 months   Her most recent blood work shows She has no evidence of end organ damage  Her free light chains and immunoglobulins and the rest of her blood work Still does not show any indication of myeloma pointing to a diagnosis of MGUS  Her skeletal survey In the past was also negative  At this point I will see her back in 6 months with just blood work  She still has a small M spike of  16  At this point I'll see her back in a year  Goals and Barriers:  Current Goal:  Prolong Survival from MGUS  Barriers: None  Patient's Capacity to Self Care:  Patient  able to self care  Portions of the record may have been created with voice recognition software   Occasional wrong word or "sound a like" substitutions may have occurred due to the inherent limitations of voice recognition software   Read the chart carefully and recognize, using context, where substitutions have occurred

## 2018-07-12 ENCOUNTER — OFFICE VISIT (OUTPATIENT)
Dept: INTERNAL MEDICINE CLINIC | Facility: CLINIC | Age: 68
End: 2018-07-12
Payer: COMMERCIAL

## 2018-07-12 VITALS
BODY MASS INDEX: 20.22 KG/M2 | HEIGHT: 60 IN | HEART RATE: 66 BPM | OXYGEN SATURATION: 96 % | DIASTOLIC BLOOD PRESSURE: 70 MMHG | SYSTOLIC BLOOD PRESSURE: 96 MMHG | WEIGHT: 103 LBS

## 2018-07-12 DIAGNOSIS — R74.8 ABNORMAL TRANSAMINASES: ICD-10-CM

## 2018-07-12 DIAGNOSIS — G43.909 MIGRAINE WITHOUT STATUS MIGRAINOSUS, NOT INTRACTABLE, UNSPECIFIED MIGRAINE TYPE: Primary | ICD-10-CM

## 2018-07-12 DIAGNOSIS — E78.2 MIXED HYPERLIPIDEMIA: ICD-10-CM

## 2018-07-12 DIAGNOSIS — E88.89 STEATOSIS (HCC): ICD-10-CM

## 2018-07-12 DIAGNOSIS — M19.90 ARTHRITIS: ICD-10-CM

## 2018-07-12 DIAGNOSIS — Z23 NEED FOR VACCINATION FOR PNEUMOCOCCUS: ICD-10-CM

## 2018-07-12 DIAGNOSIS — M81.0 OSTEOPOROSIS, POST-MENOPAUSAL: ICD-10-CM

## 2018-07-12 DIAGNOSIS — D47.2 MGUS (MONOCLONAL GAMMOPATHY OF UNKNOWN SIGNIFICANCE): ICD-10-CM

## 2018-07-12 PROCEDURE — 99214 OFFICE O/P EST MOD 30 MIN: CPT | Performed by: INTERNAL MEDICINE

## 2018-07-12 PROCEDURE — 90670 PCV13 VACCINE IM: CPT | Performed by: INTERNAL MEDICINE

## 2018-07-12 PROCEDURE — 1101F PT FALLS ASSESS-DOCD LE1/YR: CPT | Performed by: INTERNAL MEDICINE

## 2018-07-12 PROCEDURE — 3008F BODY MASS INDEX DOCD: CPT | Performed by: INTERNAL MEDICINE

## 2018-07-12 PROCEDURE — 90471 IMMUNIZATION ADMIN: CPT | Performed by: INTERNAL MEDICINE

## 2018-07-12 PROCEDURE — 1036F TOBACCO NON-USER: CPT | Performed by: INTERNAL MEDICINE

## 2018-07-12 PROCEDURE — 1160F RVW MEDS BY RX/DR IN RCRD: CPT | Performed by: INTERNAL MEDICINE

## 2018-07-12 NOTE — PROGRESS NOTES
Assessment/Plan:       Diagnoses and all orders for this visit:    Migraine without status migrainosus, not intractable, unspecified migraine type    Osteoporosis, post-menopausal    Arthritis    Mixed hyperlipidemia    MGUS (monoclonal gammopathy of unknown significance)    Abnormal transaminases              Subjective:      Patient ID: Meggan Woo is a 74 y o  female  A 72-year-old female    Monoclonal gammopathy of uncertain significance followed by Hematology  Steatosis without clinical sequelae  Hyperlipidemia; treated with Lipitor  History of migraine headaches; not very active  The patient uses some tripped and rarely but manages to stay ahead of this by keeping herself hydrated    Colonoscopy up-to-date    Mammogram up-to-date    Needs Prevnar vaccine and lipid panel  The patient reports her right hip pain radiating down the lateral aspect of the right leg occurring occasionally at night; it does not happen when she is walking  The following portions of the patient's history were reviewed and updated as appropriate:   She has a past medical history of H/O nonmelanoma skin cancer  ,   does not have any pertinent problems on file  ,   has a past surgical history that includes Colonoscopy  ,  family history is not on file  ,   reports that she has never smoked  She has never used smokeless tobacco  She reports that she drinks alcohol  She reports that she does not use drugs  ,  is allergic to dust mite extract and pollen extract     Current Outpatient Prescriptions   Medication Sig Dispense Refill    aspirin 81 MG tablet Take by mouth      atorvastatin (LIPITOR) 20 mg tablet Take 20 mg by mouth daily  3    Calcium Carbonate-Vitamin D (CALCIUM 600+D) 600-200 MG-UNIT TABS Take by mouth      multivitamin (THERAGRAN) TABS Take by mouth      olopatadine HCl (PATADAY) 0 2 % opth drops INSTILL 1 DROP INTO AFFECTED EYES DAILY      SUMAtriptan (IMITREX) 100 mg tablet TAKE 1 TABLET BY MOUTH ONCE DAILY AS NEEDED MAY REPEAT DOSE IN 2 HOURS   5     No current facility-administered medications for this visit  Review of Systems   Constitutional: Negative for chills and fever  HENT: Negative for sore throat and trouble swallowing  Eyes: Negative for pain  Respiratory: Negative for cough, shortness of breath and wheezing  Cardiovascular: Negative for chest pain and leg swelling  Gastrointestinal: Negative for abdominal pain, diarrhea, nausea and vomiting  Endocrine: Negative for cold intolerance and heat intolerance  Genitourinary: Negative for dysuria, frequency and pelvic pain  Musculoskeletal: Positive for arthralgias  Negative for joint swelling  Skin: Negative for rash and wound  Allergic/Immunologic: Negative for immunocompromised state  Neurological: Negative for dizziness, seizures, syncope and headaches  Psychiatric/Behavioral: Negative for dysphoric mood  The patient is not nervous/anxious  Objective:  Vitals:    07/12/18 1308   BP: 96/70   Pulse: 66   SpO2: 96%      Physical Exam   Constitutional: She is oriented to person, place, and time  She appears well-developed and well-nourished  HENT:   Head: Normocephalic and atraumatic  Eyes: EOM are normal  Pupils are equal, round, and reactive to light  Neck: Normal range of motion  Neck supple  No tracheal deviation present  No thyromegaly present  Cardiovascular: Normal rate, regular rhythm and normal heart sounds  Exam reveals no gallop  No murmur heard  Pulmonary/Chest: No respiratory distress  She has no wheezes  She has no rales  Abdominal: Soft  Bowel sounds are normal  There is no tenderness  Musculoskeletal: Normal range of motion  She exhibits no tenderness or deformity  Neurological: She is alert and oriented to person, place, and time  Coordination normal    Skin: Skin is warm  Psychiatric: She has a normal mood and affect   Judgment normal

## 2018-07-12 NOTE — PATIENT INSTRUCTIONS
The right hip pain does not sound very alarming  If it be gets worse or is really annoying, let me know, but right now I do not think we need to do any workup  Recheck liver ultrasound  Prevnar vaccine today  Shingles vaccine as an outpatient  Follow-up in 6 months

## 2018-07-17 ENCOUNTER — HOSPITAL ENCOUNTER (OUTPATIENT)
Dept: ULTRASOUND IMAGING | Facility: CLINIC | Age: 68
Discharge: HOME | End: 2018-07-17
Payer: COMMERCIAL

## 2018-07-17 DIAGNOSIS — E88.89 STEATOSIS (HCC): ICD-10-CM

## 2018-07-17 PROCEDURE — 76705 ECHO EXAM OF ABDOMEN: CPT

## 2018-07-18 ENCOUNTER — APPOINTMENT (OUTPATIENT)
Dept: LAB | Facility: CLINIC | Age: 68
End: 2018-07-18
Payer: COMMERCIAL

## 2018-07-18 DIAGNOSIS — E78.2 MIXED HYPERLIPIDEMIA: ICD-10-CM

## 2018-07-18 LAB
CHOLEST SERPL-MCNC: 151 MG/DL (ref 50–200)
HDLC SERPL-MCNC: 98 MG/DL (ref 40–60)
LDLC SERPL CALC-MCNC: 43 MG/DL (ref 0–100)
TRIGL SERPL-MCNC: 52 MG/DL

## 2018-07-18 PROCEDURE — 36415 COLL VENOUS BLD VENIPUNCTURE: CPT

## 2018-07-18 PROCEDURE — 80061 LIPID PANEL: CPT

## 2018-08-15 ENCOUNTER — OFFICE VISIT (OUTPATIENT)
Dept: DERMATOLOGY | Facility: CLINIC | Age: 68
End: 2018-08-15
Payer: COMMERCIAL

## 2018-08-15 DIAGNOSIS — L57.0 ACTINIC KERATOSIS: Primary | ICD-10-CM

## 2018-08-15 DIAGNOSIS — L82.1 SEBORRHEIC KERATOSIS: ICD-10-CM

## 2018-08-15 DIAGNOSIS — Z85.828 HISTORY OF SKIN CANCER: ICD-10-CM

## 2018-08-15 DIAGNOSIS — Z13.89 SCREENING FOR SKIN CONDITION: ICD-10-CM

## 2018-08-15 PROCEDURE — 99213 OFFICE O/P EST LOW 20 MIN: CPT | Performed by: DERMATOLOGY

## 2018-08-15 PROCEDURE — 17000 DESTRUCT PREMALG LESION: CPT | Performed by: DERMATOLOGY

## 2018-08-15 NOTE — PROGRESS NOTES
[address] [facility] DERMATOLOGY  [address]  [place]  [phone]  [phone]     MRN: [id number] : 1950  Encounter: [id number]  Patient Information: Calvin Coppola  Chief complaint:Yearly skin check    History of present illness:  79-year-old female presents for overall skin check previous history of skin cancer no specific complaints lesion we noted has been present for a little while  Past Medical History:   Diagnosis Date    H/O nonmelanoma skin cancer     last assessed 17     Past Surgical History:   Procedure Laterality Date    COLONOSCOPY      last assessed 14, next in 2016     Social History   History   Alcohol Use    Yes     Comment: minimum     History   Drug Use No     History   Smoking Status    Never Smoker   Smokeless Tobacco    Never Used     No family history on file  Meds/Allergies   Allergies   Allergen Reactions    Dust Mite Extract     Pollen Extract        Meds:  Prior to Admission medications    Medication Sig Start Date End Date Taking?  Authorizing Provider   aspirin 81 MG tablet Take by mouth   Yes Historical Provider, MD   atorvastatin (LIPITOR) 20 mg tablet Take 20 mg by mouth daily 3/29/18  Yes Historical Provider, MD   Calcium Carbonate-Vitamin D (CALCIUM 600+D) 600-200 MG-UNIT TABS Take by mouth   Yes Historical Provider, MD   multivitamin (THERAGRAN) TABS Take by mouth   Yes Historical Provider, MD   olopatadine HCl (PATADAY) 0 2 % opth drops INSTILL 1 DROP INTO AFFECTED EYES DAILY 17  Yes Historical Provider, MD   SUMAtriptan (IMITREX) 100 mg tablet TAKE 1 TABLET BY MOUTH ONCE DAILY AS NEEDED MAY REPEAT DOSE IN 2 HOURS  18  Yes Historical Provider, MD       Subjective:     Review of Systems:    General: negative for - chills, fatigue, fever,  weight gain or weight loss  Psychological: negative for - anxiety, behavioral disorder, concentration difficulties, decreased libido, depression, irritability, memory difficulties, mood swings, sleep disturbances or suicidal ideation  ENT: negative for - hearing difficulties , nasal congestion, nasal discharge, oral lesions, sinus pain, sneezing, sore throat  Allergy and Immunology: negative for - hives, insect bite sensitivity,  Hematological and Lymphatic: negative for - bleeding problems, blood clots,bruising, swollen lymph nodes  Endocrine: negative for - hair pattern changes, hot flashes, malaise/lethargy, mood swings, palpitations, polydipsia/polyuria, skin changes, temperature intolerance or unexpected weight change  Respiratory: negative for - cough, hemoptysis, orthopnea, shortness of breath, or wheezing  Cardiovascular: negative for - chest pain, dyspnea on exertion, edema,  Gastrointestinal: negative for - abdominal pain, nausea/vomiting  Genito-Urinary: negative for - dysuria, incontinence, irregular/heavy menses or urinary frequency/urgency  Musculoskeletal: negative for - gait disturbance, joint pain, joint stiffness, joint swelling, muscle pain, muscular weakness  Dermatological:  As in HPI  Neurological: negative for confusion, dizziness, headaches, impaired coordination/balance, memory loss, numbness/tingling, seizures, speech problems, tremors or weakness       Objective: There were no vitals taken for this visit      Physical Exam:    General Appearance:    Alert, cooperative, no distress   Head:    Normocephalic, without obvious abnormality, atraumatic           Skin:   A full skin exam was performed including scalp, head scalp, eyes, ears, nose, lips, neck, chest, axilla, abdomen, back, buttocks, bilateral upper extremities, bilateral lower extremities, hands, feet, fingers, toes, fingernails, and toenails  Scaling erythematous area noted on the right forehead normal keratotic papules with greasy stuck on appearance previous site of skin cancer on the chest well healed without recurrence nothing else atypical noted on exam  Physical Exam HENT:   Head:          Cryotherapy Procedure Note    Pre-operative Diagnosis: actinic keratosis    Plan:  1  Instructed to keep the area dry and clean thereafter  Apply petrolatum if area gets crusty  2  Recommended that the patient use acetaminophen  as needed for pain  Locations:  Right forehead    Indications: Destruction of  Actinic keratosis times 1  Patient informed of risks (permanent scarring, infection, light or dark discoloration, bleeding, infection, weakness, numbness and recurrence of the lesion) and benefits of the procedure and verbal informed consent obtained  The areas are treated with liquid nitrogen therapy, frozen until ice ball extended 2 mm beyond lesion, allowed to thaw, and treated again  The patient tolerated procedure well  The patient was instructed on post-op care, warned that there may be blister formation, redness and pain  Recommend OTC analgesia as needed for pain  Condition:  Stable    Complications:  none  Assessment:     1  Actinic keratosis     2  Seborrheic keratosis     3  Screening for skin condition     4  History of skin cancer           Plan:   Actinic Keratosis:  Patient advised lesions are precancers  These should resolve with cryosurgery if not to let us know sun block recommended  Seborrheic keratosis patient reassured these are normal growths we acquire with age no treatment needed  History of skin cancer in no recurrence nothing else atypical sunblock recommended follow-up in 1 year  Screening for dermatologic disorders nothing else of concern noted on complete exam follow-up in 1 year      Mike King MD  8/15/2018,10:11 AM    Portions of the record may have been created with voice recognition software   Occasional wrong word or "sound a like" substitutions may have occurred due to the inherent limitations of voice recognition software   Read the chart carefully and recognize, using context, where substitutions have occurred

## 2018-08-15 NOTE — PATIENT INSTRUCTIONS
Actinic Keratosis:  Patient advised lesions are precancers  These should resolve with cryosurgery if not to let us know sun block recommended  Seborrheic keratosis patient reassured these are normal growths we acquire with age no treatment needed  History of skin cancer in no recurrence nothing else atypical sunblock recommended follow-up in 1 year  Screening for dermatologic disorders nothing else of concern noted on complete exam follow-up in 1 year  Treatment with Cryotherapy    The doctor has treated your skin with nitrogen, which is 320 degrees Fahrenheit below zero  He has given the treated area "frostbite "    Stinging should subside within a few hours  You can take Tylenol for pain, if needed  Over the next few days, it is normal if the area becomes reddened, a blood blister, or swollen with fluid  If the lesion treated was near the eye - you could get a swollen eye over the next few days  Do not panic! This is all temporary, and will resolve with time  There is no special treatment - just keep the area clean  Makeup and BandAids can be used, if preferred  When the area starts to dry up and peel off, using Vaseline can help healing  It usually takes up to a month for it to heal   Some lesions are recurrent and may require repeat treatments  If a lesion has not healed in one month, please don't hesitate to contact us  If you have any further questions that are not answered here, please call us  [phone]    Thank you for allowing us to care for you

## 2018-09-22 DIAGNOSIS — E78.2 MIXED HYPERLIPIDEMIA: Primary | ICD-10-CM

## 2018-09-24 RX ORDER — ATORVASTATIN CALCIUM 20 MG/1
TABLET, FILM COATED ORAL
Qty: 90 TABLET | Refills: 3 | Status: SHIPPED | OUTPATIENT
Start: 2018-09-24 | End: 2019-09-26 | Stop reason: SDUPTHER

## 2018-12-06 ENCOUNTER — HOSPITAL ENCOUNTER (OUTPATIENT)
Dept: MAMMOGRAPHY | Facility: CLINIC | Age: 68
Discharge: HOME | End: 2018-12-06
Payer: COMMERCIAL

## 2018-12-06 ENCOUNTER — TRANSCRIBE ORDERS (OUTPATIENT)
Dept: LAB | Facility: CLINIC | Age: 68
End: 2018-12-06

## 2018-12-06 ENCOUNTER — APPOINTMENT (OUTPATIENT)
Dept: LAB | Facility: CLINIC | Age: 68
End: 2018-12-06
Payer: COMMERCIAL

## 2018-12-06 DIAGNOSIS — E55.9 VITAMIN D DEFICIENCY: ICD-10-CM

## 2018-12-06 DIAGNOSIS — R53.83 FATIGUE, UNSPECIFIED TYPE: ICD-10-CM

## 2018-12-06 DIAGNOSIS — M81.0 OSTEOPOROSIS, POST-MENOPAUSAL: ICD-10-CM

## 2018-12-06 DIAGNOSIS — M81.0 OSTEOPOROSIS, UNSPECIFIED OSTEOPOROSIS TYPE, UNSPECIFIED PATHOLOGICAL FRACTURE PRESENCE: Primary | ICD-10-CM

## 2018-12-06 DIAGNOSIS — M81.0 OSTEOPOROSIS, UNSPECIFIED OSTEOPOROSIS TYPE, UNSPECIFIED PATHOLOGICAL FRACTURE PRESENCE: ICD-10-CM

## 2018-12-06 PROCEDURE — 84443 ASSAY THYROID STIM HORMONE: CPT

## 2018-12-06 PROCEDURE — 80053 COMPREHEN METABOLIC PANEL: CPT

## 2018-12-06 PROCEDURE — 83970 ASSAY OF PARATHORMONE: CPT

## 2018-12-06 PROCEDURE — 77080 DXA BONE DENSITY AXIAL: CPT

## 2018-12-06 PROCEDURE — 85025 COMPLETE CBC W/AUTO DIFF WBC: CPT

## 2018-12-06 PROCEDURE — 82306 VITAMIN D 25 HYDROXY: CPT

## 2018-12-06 PROCEDURE — 36415 COLL VENOUS BLD VENIPUNCTURE: CPT

## 2018-12-06 PROCEDURE — 84100 ASSAY OF PHOSPHORUS: CPT

## 2018-12-07 LAB
25(OH)D3 SERPL-MCNC: 32.4 NG/ML (ref 30–100)
ALBUMIN SERPL BCP-MCNC: 3.8 G/DL (ref 3.5–5)
ALP SERPL-CCNC: 46 U/L (ref 46–116)
ALT SERPL W P-5'-P-CCNC: 32 U/L (ref 12–78)
ANION GAP SERPL CALCULATED.3IONS-SCNC: 5 MMOL/L (ref 4–13)
AST SERPL W P-5'-P-CCNC: 45 U/L (ref 5–45)
BASOPHILS # BLD AUTO: 0.03 THOUSANDS/ΜL (ref 0–0.1)
BASOPHILS NFR BLD AUTO: 1 % (ref 0–1)
BILIRUB SERPL-MCNC: 0.38 MG/DL (ref 0.2–1)
BUN SERPL-MCNC: 13 MG/DL (ref 5–25)
CALCIUM SERPL-MCNC: 8.5 MG/DL (ref 8.3–10.1)
CHLORIDE SERPL-SCNC: 105 MMOL/L (ref 100–108)
CO2 SERPL-SCNC: 30 MMOL/L (ref 21–32)
CREAT SERPL-MCNC: 0.76 MG/DL (ref 0.6–1.3)
EOSINOPHIL # BLD AUTO: 0.08 THOUSAND/ΜL (ref 0–0.61)
EOSINOPHIL NFR BLD AUTO: 1 % (ref 0–6)
ERYTHROCYTE [DISTWIDTH] IN BLOOD BY AUTOMATED COUNT: 11.9 % (ref 11.6–15.1)
GFR SERPL CREATININE-BSD FRML MDRD: 82 ML/MIN/1.73SQ M
GLUCOSE SERPL-MCNC: 93 MG/DL (ref 65–140)
HCT VFR BLD AUTO: 41.2 % (ref 34.8–46.1)
HGB BLD-MCNC: 13.5 G/DL (ref 11.5–15.4)
IMM GRANULOCYTES # BLD AUTO: 0.01 THOUSAND/UL (ref 0–0.2)
IMM GRANULOCYTES NFR BLD AUTO: 0 % (ref 0–2)
LYMPHOCYTES # BLD AUTO: 2.41 THOUSANDS/ΜL (ref 0.6–4.47)
LYMPHOCYTES NFR BLD AUTO: 40 % (ref 14–44)
MCH RBC QN AUTO: 32 PG (ref 26.8–34.3)
MCHC RBC AUTO-ENTMCNC: 32.8 G/DL (ref 31.4–37.4)
MCV RBC AUTO: 98 FL (ref 82–98)
MONOCYTES # BLD AUTO: 0.39 THOUSAND/ΜL (ref 0.17–1.22)
MONOCYTES NFR BLD AUTO: 7 % (ref 4–12)
NEUTROPHILS # BLD AUTO: 3.08 THOUSANDS/ΜL (ref 1.85–7.62)
NEUTS SEG NFR BLD AUTO: 51 % (ref 43–75)
NRBC BLD AUTO-RTO: 0 /100 WBCS
PHOSPHATE SERPL-MCNC: 3.2 MG/DL (ref 2.3–4.1)
PLATELET # BLD AUTO: 267 THOUSANDS/UL (ref 149–390)
PMV BLD AUTO: 10.9 FL (ref 8.9–12.7)
POTASSIUM SERPL-SCNC: 4.1 MMOL/L (ref 3.5–5.3)
PROT SERPL-MCNC: 7.4 G/DL (ref 6.4–8.2)
PTH-INTACT SERPL-MCNC: 57.4 PG/ML (ref 18.4–80.1)
RBC # BLD AUTO: 4.22 MILLION/UL (ref 3.81–5.12)
SODIUM SERPL-SCNC: 140 MMOL/L (ref 136–145)
TSH SERPL DL<=0.05 MIU/L-ACNC: 1.16 UIU/ML (ref 0.36–3.74)
WBC # BLD AUTO: 6 THOUSAND/UL (ref 4.31–10.16)

## 2018-12-14 ENCOUNTER — TELEPHONE (OUTPATIENT)
Dept: INTERNAL MEDICINE CLINIC | Facility: CLINIC | Age: 68
End: 2018-12-14

## 2018-12-14 NOTE — TELEPHONE ENCOUNTER
----- Message from Hailey Garcia MD sent at 12/14/2018 12:36 PM EST -----  Osteoporosis of the spine noted on bone density test , unchanged from prior    She should come in to discuss this

## 2018-12-27 ENCOUNTER — OFFICE VISIT (OUTPATIENT)
Dept: INTERNAL MEDICINE CLINIC | Facility: CLINIC | Age: 68
End: 2018-12-27
Payer: COMMERCIAL

## 2018-12-27 VITALS
DIASTOLIC BLOOD PRESSURE: 82 MMHG | WEIGHT: 101.6 LBS | SYSTOLIC BLOOD PRESSURE: 120 MMHG | HEART RATE: 71 BPM | OXYGEN SATURATION: 96 % | BODY MASS INDEX: 19.84 KG/M2

## 2018-12-27 DIAGNOSIS — M81.0 OSTEOPOROSIS, POST-MENOPAUSAL: Primary | ICD-10-CM

## 2018-12-27 PROCEDURE — 99214 OFFICE O/P EST MOD 30 MIN: CPT | Performed by: INTERNAL MEDICINE

## 2018-12-27 NOTE — PROGRESS NOTES
Assessment/Plan:       Diagnoses and all orders for this visit:    Osteoporosis, post-menopausal          There are no Patient Instructions on file for this visit  Subjective:      Patient ID: Gopal Becerra is a 79 y o  female  HPI    The following portions of the patient's history were reviewed and updated as appropriate:   She has a past medical history of H/O nonmelanoma skin cancer  ,   does not have any pertinent problems on file  ,   has a past surgical history that includes Colonoscopy  ,  family history is not on file  ,   reports that she has never smoked  She has never used smokeless tobacco  She reports that she drinks alcohol  She reports that she does not use drugs  ,  is allergic to dust mite extract and pollen extract     Current Outpatient Prescriptions   Medication Sig Dispense Refill    aspirin 81 MG tablet Take by mouth      atorvastatin (LIPITOR) 20 mg tablet TAKE 1 TABLET BY MOUTH EVERY DAY 90 tablet 3    Calcium Carbonate-Vitamin D (CALCIUM 600+D) 600-200 MG-UNIT TABS Take by mouth      multivitamin (THERAGRAN) TABS Take by mouth      olopatadine HCl (PATADAY) 0 2 % opth drops INSTILL 1 DROP INTO AFFECTED EYES DAILY      SUMAtriptan (IMITREX) 100 mg tablet TAKE 1 TABLET BY MOUTH ONCE DAILY AS NEEDED MAY REPEAT DOSE IN 2 HOURS   5     No current facility-administered medications for this visit          Review of Systems      Objective:  Vitals:    12/27/18 1812   BP: 120/82   Pulse: 71   SpO2: 96%      Physical Exam

## 2019-03-26 ENCOUNTER — TELEPHONE (OUTPATIENT)
Dept: INTERNAL MEDICINE CLINIC | Facility: CLINIC | Age: 69
End: 2019-03-26

## 2019-03-26 DIAGNOSIS — Z23 NEED FOR 23-POLYVALENT PNEUMOCOCCAL POLYSACCHARIDE VACCINE: Primary | ICD-10-CM

## 2019-04-03 ENCOUNTER — CLINICAL SUPPORT (OUTPATIENT)
Dept: INTERNAL MEDICINE CLINIC | Facility: CLINIC | Age: 69
End: 2019-04-03
Payer: COMMERCIAL

## 2019-04-03 DIAGNOSIS — Z23 NEED FOR VACCINATION FOR PNEUMOCOCCUS: ICD-10-CM

## 2019-04-03 PROCEDURE — 90471 IMMUNIZATION ADMIN: CPT

## 2019-04-03 PROCEDURE — 90732 PPSV23 VACC 2 YRS+ SUBQ/IM: CPT

## 2019-04-29 DIAGNOSIS — D47.2 MGUS (MONOCLONAL GAMMOPATHY OF UNKNOWN SIGNIFICANCE): Primary | ICD-10-CM

## 2019-06-04 ENCOUNTER — APPOINTMENT (OUTPATIENT)
Dept: LAB | Facility: CLINIC | Age: 69
End: 2019-06-04
Payer: COMMERCIAL

## 2019-06-04 DIAGNOSIS — D47.2 MGUS (MONOCLONAL GAMMOPATHY OF UNKNOWN SIGNIFICANCE): ICD-10-CM

## 2019-06-04 LAB
ALBUMIN SERPL BCP-MCNC: 4.2 G/DL (ref 3.5–5)
ALP SERPL-CCNC: 53 U/L (ref 46–116)
ALT SERPL W P-5'-P-CCNC: 45 U/L (ref 12–78)
ANION GAP SERPL CALCULATED.3IONS-SCNC: 3 MMOL/L (ref 4–13)
AST SERPL W P-5'-P-CCNC: 62 U/L (ref 5–45)
BASOPHILS # BLD AUTO: 0.04 THOUSANDS/ΜL (ref 0–0.1)
BASOPHILS NFR BLD AUTO: 1 % (ref 0–1)
BILIRUB SERPL-MCNC: 0.39 MG/DL (ref 0.2–1)
BUN SERPL-MCNC: 10 MG/DL (ref 5–25)
CALCIUM SERPL-MCNC: 8.7 MG/DL (ref 8.3–10.1)
CHLORIDE SERPL-SCNC: 105 MMOL/L (ref 100–108)
CO2 SERPL-SCNC: 30 MMOL/L (ref 21–32)
CREAT SERPL-MCNC: 0.74 MG/DL (ref 0.6–1.3)
EOSINOPHIL # BLD AUTO: 0.09 THOUSAND/ΜL (ref 0–0.61)
EOSINOPHIL NFR BLD AUTO: 2 % (ref 0–6)
ERYTHROCYTE [DISTWIDTH] IN BLOOD BY AUTOMATED COUNT: 11.5 % (ref 11.6–15.1)
GFR SERPL CREATININE-BSD FRML MDRD: 85 ML/MIN/1.73SQ M
GLUCOSE SERPL-MCNC: 95 MG/DL (ref 65–140)
HCT VFR BLD AUTO: 41.5 % (ref 34.8–46.1)
HGB BLD-MCNC: 14 G/DL (ref 11.5–15.4)
IGA SERPL-MCNC: 209 MG/DL (ref 70–400)
IGG SERPL-MCNC: 1120 MG/DL (ref 700–1600)
IGM SERPL-MCNC: 88 MG/DL (ref 40–230)
IMM GRANULOCYTES # BLD AUTO: 0.01 THOUSAND/UL (ref 0–0.2)
IMM GRANULOCYTES NFR BLD AUTO: 0 % (ref 0–2)
LYMPHOCYTES # BLD AUTO: 2.26 THOUSANDS/ΜL (ref 0.6–4.47)
LYMPHOCYTES NFR BLD AUTO: 37 % (ref 14–44)
MCH RBC QN AUTO: 32.2 PG (ref 26.8–34.3)
MCHC RBC AUTO-ENTMCNC: 33.7 G/DL (ref 31.4–37.4)
MCV RBC AUTO: 95 FL (ref 82–98)
MONOCYTES # BLD AUTO: 0.43 THOUSAND/ΜL (ref 0.17–1.22)
MONOCYTES NFR BLD AUTO: 7 % (ref 4–12)
NEUTROPHILS # BLD AUTO: 3.37 THOUSANDS/ΜL (ref 1.85–7.62)
NEUTS SEG NFR BLD AUTO: 53 % (ref 43–75)
NRBC BLD AUTO-RTO: 0 /100 WBCS
PLATELET # BLD AUTO: 276 THOUSANDS/UL (ref 149–390)
PMV BLD AUTO: 10.3 FL (ref 8.9–12.7)
POTASSIUM SERPL-SCNC: 4 MMOL/L (ref 3.5–5.3)
PROT SERPL-MCNC: 7.7 G/DL (ref 6.4–8.2)
RBC # BLD AUTO: 4.35 MILLION/UL (ref 3.81–5.12)
SODIUM SERPL-SCNC: 138 MMOL/L (ref 136–145)
WBC # BLD AUTO: 6.2 THOUSAND/UL (ref 4.31–10.16)

## 2019-06-04 PROCEDURE — 83883 ASSAY NEPHELOMETRY NOT SPEC: CPT | Performed by: INTERNAL MEDICINE

## 2019-06-04 PROCEDURE — 82784 ASSAY IGA/IGD/IGG/IGM EACH: CPT

## 2019-06-04 PROCEDURE — 36415 COLL VENOUS BLD VENIPUNCTURE: CPT | Performed by: INTERNAL MEDICINE

## 2019-06-04 PROCEDURE — 84165 PROTEIN E-PHORESIS SERUM: CPT | Performed by: PATHOLOGY

## 2019-06-04 PROCEDURE — 84165 PROTEIN E-PHORESIS SERUM: CPT | Performed by: INTERNAL MEDICINE

## 2019-06-04 PROCEDURE — 85025 COMPLETE CBC W/AUTO DIFF WBC: CPT | Performed by: INTERNAL MEDICINE

## 2019-06-04 PROCEDURE — 86334 IMMUNOFIX E-PHORESIS SERUM: CPT | Performed by: INTERNAL MEDICINE

## 2019-06-04 PROCEDURE — 80053 COMPREHEN METABOLIC PANEL: CPT | Performed by: INTERNAL MEDICINE

## 2019-06-04 PROCEDURE — 86334 IMMUNOFIX E-PHORESIS SERUM: CPT | Performed by: PATHOLOGY

## 2019-06-05 LAB
KAPPA LC FREE SER-MCNC: 12.8 MG/L (ref 3.3–19.4)
KAPPA LC FREE/LAMBDA FREE SER: 1.41 {RATIO} (ref 0.26–1.65)
LAMBDA LC FREE SERPL-MCNC: 9.1 MG/L (ref 5.7–26.3)

## 2019-06-06 LAB
ALBUMIN SERPL ELPH-MCNC: 5.29 G/DL (ref 3.5–5)
ALBUMIN SERPL ELPH-MCNC: 71.5 % (ref 52–65)
ALPHA1 GLOB SERPL ELPH-MCNC: 0.19 G/DL (ref 0.1–0.4)
ALPHA1 GLOB SERPL ELPH-MCNC: 2.6 % (ref 2.5–5)
ALPHA2 GLOB SERPL ELPH-MCNC: 0.43 G/DL (ref 0.4–1.2)
ALPHA2 GLOB SERPL ELPH-MCNC: 5.8 % (ref 7–13)
BETA GLOB ABNORMAL SERPL ELPH-MCNC: 0.3 G/DL (ref 0.4–0.8)
BETA1 GLOB SERPL ELPH-MCNC: 4.1 % (ref 5–13)
BETA2 GLOB SERPL ELPH-MCNC: 3.4 % (ref 2–8)
BETA2+GAMMA GLOB SERPL ELPH-MCNC: 0.25 G/DL (ref 0.2–0.5)
GAMMA GLOB ABNORMAL SERPL ELPH-MCNC: 0.93 G/DL (ref 0.5–1.6)
GAMMA GLOB SERPL ELPH-MCNC: 12.6 % (ref 12–22)
IGG/ALB SER: 2.51 {RATIO} (ref 1.1–1.8)
INTERPRETATION UR IFE-IMP: NORMAL
M PROTEIN 1 MFR SERPL ELPH: 1.7 %
M PROTEIN 1 SERPL ELPH-MCNC: 0.13 G/DL
PROT PATTERN SERPL ELPH-IMP: ABNORMAL
PROT SERPL-MCNC: 7.4 G/DL (ref 6.4–8.2)

## 2019-06-18 ENCOUNTER — OFFICE VISIT (OUTPATIENT)
Dept: HEMATOLOGY ONCOLOGY | Facility: CLINIC | Age: 69
End: 2019-06-18
Payer: COMMERCIAL

## 2019-06-18 VITALS
RESPIRATION RATE: 16 BRPM | SYSTOLIC BLOOD PRESSURE: 108 MMHG | BODY MASS INDEX: 20.42 KG/M2 | OXYGEN SATURATION: 96 % | WEIGHT: 104 LBS | TEMPERATURE: 96 F | DIASTOLIC BLOOD PRESSURE: 68 MMHG | HEART RATE: 61 BPM | HEIGHT: 60 IN

## 2019-06-18 DIAGNOSIS — D47.2 MGUS (MONOCLONAL GAMMOPATHY OF UNKNOWN SIGNIFICANCE): Primary | ICD-10-CM

## 2019-06-18 PROCEDURE — 99214 OFFICE O/P EST MOD 30 MIN: CPT | Performed by: INTERNAL MEDICINE

## 2019-07-15 ENCOUNTER — OFFICE VISIT (OUTPATIENT)
Dept: INTERNAL MEDICINE CLINIC | Facility: CLINIC | Age: 69
End: 2019-07-15
Payer: COMMERCIAL

## 2019-07-15 VITALS
BODY MASS INDEX: 19.87 KG/M2 | WEIGHT: 101.2 LBS | HEART RATE: 65 BPM | HEIGHT: 60 IN | OXYGEN SATURATION: 96 % | SYSTOLIC BLOOD PRESSURE: 110 MMHG | DIASTOLIC BLOOD PRESSURE: 70 MMHG

## 2019-07-15 DIAGNOSIS — E88.89 STEATOSIS (HCC): ICD-10-CM

## 2019-07-15 DIAGNOSIS — E78.2 MIXED HYPERLIPIDEMIA: Primary | ICD-10-CM

## 2019-07-15 DIAGNOSIS — D47.2 MGUS (MONOCLONAL GAMMOPATHY OF UNKNOWN SIGNIFICANCE): ICD-10-CM

## 2019-07-15 PROCEDURE — 99213 OFFICE O/P EST LOW 20 MIN: CPT | Performed by: INTERNAL MEDICINE

## 2019-07-15 PROCEDURE — 1160F RVW MEDS BY RX/DR IN RCRD: CPT | Performed by: INTERNAL MEDICINE

## 2019-07-15 PROCEDURE — 3008F BODY MASS INDEX DOCD: CPT | Performed by: INTERNAL MEDICINE

## 2019-07-15 PROCEDURE — 1101F PT FALLS ASSESS-DOCD LE1/YR: CPT | Performed by: INTERNAL MEDICINE

## 2019-07-15 NOTE — PROGRESS NOTES
Assessment/Plan:       Diagnoses and all orders for this visit:    Mixed hyperlipidemia    MGUS (monoclonal gammopathy of unknown significance)    Steatosis          Patient Instructions    A 82-year-old female patient doing well overall  Should do her routine labs and see me again yearly        Subjective:      Patient ID: Estevan Calderon is a 79 y o  female  A 82-year-old female    Monoclonal gammopathy of uncertain significance followed by Hematology  Steatosis without clinical sequelae  Hyperlipidemia; treated with Lipitor  History of migraine headaches; not very active  The patient uses some tripped and rarely but manages to stay ahead of this by keeping herself hydrated    Colonoscopy up-to-date    Mammogram up-to-date              The following portions of the patient's history were reviewed and updated as appropriate:   She has a past medical history of H/O nonmelanoma skin cancer  ,  does not have any pertinent problems on file  ,   has a past surgical history that includes Colonoscopy  ,  family history is not on file  ,   reports that she has never smoked  She has never used smokeless tobacco  She reports that she drinks alcohol  She reports that she does not use drugs  ,  is allergic to dust mite extract and pollen extract     Current Outpatient Medications   Medication Sig Dispense Refill    aspirin 81 MG tablet Take by mouth      atorvastatin (LIPITOR) 20 mg tablet TAKE 1 TABLET BY MOUTH EVERY DAY 90 tablet 3    Calcium Carbonate-Vitamin D (CALCIUM 600+D) 600-200 MG-UNIT TABS Take by mouth      multivitamin (THERAGRAN) TABS Take by mouth      olopatadine HCl (PATADAY) 0 2 % opth drops INSTILL 1 DROP INTO AFFECTED EYES DAILY      SUMAtriptan (IMITREX) 100 mg tablet TAKE 1 TABLET BY MOUTH ONCE DAILY AS NEEDED MAY REPEAT DOSE IN 2 HOURS   5     No current facility-administered medications for this visit  Review of Systems   Constitutional: Negative for chills and fever     HENT: Negative for sore throat and trouble swallowing  Eyes: Negative for pain  Respiratory: Negative for cough, shortness of breath and wheezing  Cardiovascular: Negative for chest pain and leg swelling  Gastrointestinal: Negative for abdominal pain, diarrhea, nausea and vomiting  Endocrine: Negative for cold intolerance and heat intolerance  Genitourinary: Negative for dysuria, frequency and pelvic pain  Musculoskeletal: Positive for arthralgias  Negative for joint swelling  Skin: Negative for rash and wound  Allergic/Immunologic: Negative for immunocompromised state  Neurological: Negative for dizziness, seizures, syncope and headaches  Psychiatric/Behavioral: Negative for dysphoric mood  The patient is not nervous/anxious  Objective:  Vitals:    07/15/19 1307   BP: 110/70   Pulse: 65   SpO2: 96%      Physical Exam   Constitutional: She is oriented to person, place, and time  She appears well-developed and well-nourished  HENT:   Head: Normocephalic and atraumatic  Eyes: Pupils are equal, round, and reactive to light  EOM are normal    Neck: Normal range of motion  Neck supple  No tracheal deviation present  No thyromegaly present  Cardiovascular: Normal rate, regular rhythm and normal heart sounds  Exam reveals no gallop  No murmur heard  Pulmonary/Chest: No respiratory distress  She has no wheezes  She has no rales  Abdominal: Soft  Bowel sounds are normal  There is no tenderness  Musculoskeletal: Normal range of motion  She exhibits no tenderness or deformity  Neurological: She is alert and oriented to person, place, and time  Coordination normal    Skin: Skin is warm  Psychiatric: She has a normal mood and affect   Judgment normal

## 2019-07-18 NOTE — PATIENT INSTRUCTIONS
A 80-year-old female patient doing well overall    Should do her routine labs and see me again yearly

## 2019-08-19 ENCOUNTER — OFFICE VISIT (OUTPATIENT)
Dept: DERMATOLOGY | Facility: CLINIC | Age: 69
End: 2019-08-19
Payer: COMMERCIAL

## 2019-08-19 DIAGNOSIS — Z13.89 SCREENING FOR SKIN CONDITION: ICD-10-CM

## 2019-08-19 DIAGNOSIS — Z85.828 HISTORY OF SKIN CANCER: ICD-10-CM

## 2019-08-19 DIAGNOSIS — L82.1 SEBORRHEIC KERATOSIS: Primary | ICD-10-CM

## 2019-08-19 DIAGNOSIS — L57.0 ACTINIC KERATOSIS: ICD-10-CM

## 2019-08-19 PROCEDURE — 17003 DESTRUCT PREMALG LES 2-14: CPT | Performed by: DERMATOLOGY

## 2019-08-19 PROCEDURE — 17000 DESTRUCT PREMALG LESION: CPT | Performed by: DERMATOLOGY

## 2019-08-19 PROCEDURE — 99213 OFFICE O/P EST LOW 20 MIN: CPT | Performed by: DERMATOLOGY

## 2019-08-19 NOTE — PROGRESS NOTES
[address]  [address] [address] [phone]     MRN: [id number] : 1950  Encounter: [id number]  Patient Information: Collins Masker  Chief complaint:  Yearly skin check    History of present illness:  61-year-old female presents for overall checkup previous history of skin cancer actinic keratosis no specific concerns noted  Past Medical History:   Diagnosis Date    H/O nonmelanoma skin cancer     last assessed 17     Past Surgical History:   Procedure Laterality Date    COLONOSCOPY      last assessed 14, next in 2016     Social History   Social History     Substance and Sexual Activity   Alcohol Use Yes    Frequency: 2-4 times a month    Drinks per session: 1 or 2    Binge frequency: Never    Comment: minimum     Social History     Substance and Sexual Activity   Drug Use No     Social History     Tobacco Use   Smoking Status Never Smoker   Smokeless Tobacco Never Used     History reviewed  No pertinent family history  Meds/Allergies   Allergies   Allergen Reactions    Dust Mite Extract     Pollen Extract        Meds:  Prior to Admission medications    Medication Sig Start Date End Date Taking?  Authorizing Provider   aspirin 81 MG tablet Take by mouth   Yes Historical Provider, MD   atorvastatin (LIPITOR) 20 mg tablet TAKE 1 TABLET BY MOUTH EVERY DAY 18  Yes Vasile Lopez MD   Calcium Carbonate-Vitamin D (CALCIUM 600+D) 600-200 MG-UNIT TABS Take by mouth   Yes Historical Provider, MD   multivitamin (THERAGRAN) TABS Take by mouth   Yes Historical Provider, MD   olopatadine HCl (PATADAY) 0 2 % opth drops INSTILL 1 DROP INTO AFFECTED EYES DAILY 17  Yes Historical Provider, MD   SUMAtriptan (IMITREX) 100 mg tablet TAKE 1 TABLET BY MOUTH ONCE DAILY AS NEEDED MAY REPEAT DOSE IN 2 HOURS  18  Yes Historical Provider, MD       Subjective:     Review of Systems:    General: negative for - chills, fatigue, fever,  weight gain or weight loss  Psychological: negative for - anxiety, behavioral disorder, concentration difficulties, decreased libido, depression, irritability, memory difficulties, mood swings, sleep disturbances or suicidal ideation  ENT: negative for - hearing difficulties , nasal congestion, nasal discharge, oral lesions, sinus pain, sneezing, sore throat  Allergy and Immunology: negative for - hives, insect bite sensitivity,  Hematological and Lymphatic: negative for - bleeding problems, blood clots,bruising, swollen lymph nodes  Endocrine: negative for - hair pattern changes, hot flashes, malaise/lethargy, mood swings, palpitations, polydipsia/polyuria, skin changes, temperature intolerance or unexpected weight change  Respiratory: negative for - cough, hemoptysis, orthopnea, shortness of breath, or wheezing  Cardiovascular: negative for - chest pain, dyspnea on exertion, edema,  Gastrointestinal: negative for - abdominal pain, nausea/vomiting  Genito-Urinary: negative for - dysuria, incontinence, irregular/heavy menses or urinary frequency/urgency  Musculoskeletal: negative for - gait disturbance, joint pain, joint stiffness, joint swelling, muscle pain, muscular weakness  Dermatological:  As in HPI  Neurological: negative for confusion, dizziness, headaches, impaired coordination/balance, memory loss, numbness/tingling, seizures, speech problems, tremors or weakness       Objective: There were no vitals taken for this visit      Physical Exam:    General Appearance:    Alert, cooperative, no distress   Head:    Normocephalic, without obvious abnormality, atraumatic           Skin:   A full skin exam was performed including scalp, head scalp, eyes, ears, nose, lips, neck, chest, axilla, abdomen, back, buttocks, bilateral upper extremities, bilateral lower extremities, hands, feet, fingers, toes, fingernails, and toenails normal keratotic papules greasy stuck on appearance previous sites skin cancer well healed without recurrence scaling erythematous area noted on the dorsum both hands nothing else remarkable noted exam  Physical Exam   Constitutional:          Cryotherapy Procedure Note    Pre-operative Diagnosis: actinic keratosis    Plan:  1  Instructed to keep the area dry and clean thereafter  Apply petrolatum if area gets crusty  2  Recommended that the patient use acetaminophen  as needed for pain  Locations:  Dorsum of the hands    Indications: Destruction of actinic keratosis x2    Patient informed of risks (permanent scarring, infection, light or dark discoloration, bleeding, infection, weakness, numbness and recurrence of the lesion) and benefits of the procedure and verbal informed consent obtained  The areas are treated with liquid nitrogen therapy, frozen until ice ball extended 2 mm beyond lesion, allowed to thaw, and treated again  The patient tolerated procedure well  The patient was instructed on post-op care, warned that there may be blister formation, redness and pain  Recommend OTC analgesia as needed for pain  Condition:  Stable    Complications:  none  Assessment:     1  Seborrheic keratosis     2  Screening for skin condition     3  History of skin cancer     4  Actinic keratosis           Plan:   Actinic Keratosis:  Patient advised lesions are precancers  These should resolve with cryosurgery if not to let us know sun block recommended    Seborrheic keratosis patient reassured these are normal growths we acquire with age no treatment needed  History of skin cancer in no recurrence nothing else atypical sunblock recommended follow-up in 1 year  Screening for dermatologic disorders nothing else of concern noted on complete exam follow-up in 1 year      Noé Herr MD  8/19/2019,9:19 AM    Portions of the record may have been created with voice recognition software   Occasional wrong word or "sound a like" substitutions may have occurred due to the inherent limitations of voice recognition software   Read the chart carefully and recognize, using context, where substitutions have occurred

## 2019-08-19 NOTE — PATIENT INSTRUCTIONS
Actinic Keratosis:  Patient advised lesions are precancers  These should resolve with cryosurgery if not to let us know sun block recommended  Seborrheic keratosis patient reassured these are normal growths we acquire with age no treatment needed  History of skin cancer in no recurrence nothing else atypical sunblock recommended follow-up in 1 year  Screening for dermatologic disorders nothing else of concern noted on complete exam follow-up in 1 year  Treatment with Cryotherapy    The doctor has treated your skin with nitrogen, which is 320 degrees Fahrenheit below zero  He has given the treated area "frostbite "    Stinging should subside within a few hours  You can take Tylenol for pain, if needed  Over the next few days, it is normal if the area becomes reddened, a blood blister, or swollen with fluid  If the lesion treated was near the eye - you could get a swollen eye over the next few days  Do not panic! This is all temporary, and will resolve with time  There is no special treatment - just keep the area clean  Makeup and BandAids can be used, if preferred  When the area starts to dry up and peel off, using Vaseline can help healing  It usually takes up to a month for it to heal   Some lesions are recurrent and may require repeat treatments  If a lesion has not healed in one month, please don't hesitate to contact us  If you have any further questions that are not answered here, please call us  [phone]    Thank you for allowing us to care for you

## 2019-08-20 ENCOUNTER — TELEPHONE (OUTPATIENT)
Dept: INTERNAL MEDICINE CLINIC | Facility: CLINIC | Age: 69
End: 2019-08-20

## 2019-08-20 NOTE — TELEPHONE ENCOUNTER
Spoke with: pt  Re:  mammo results  Provider's message/resuts/instructions/inquiries relayed in full detal   Comments:

## 2019-08-20 NOTE — TELEPHONE ENCOUNTER
----- Message from Tomas Christopher MD sent at 8/20/2019 11:34 AM EDT -----  Please call the patient regarding her  Result   Normal mammogram

## 2019-09-26 DIAGNOSIS — E78.2 MIXED HYPERLIPIDEMIA: ICD-10-CM

## 2019-09-26 RX ORDER — ATORVASTATIN CALCIUM 20 MG/1
TABLET, FILM COATED ORAL
Qty: 90 TABLET | Refills: 3 | Status: SHIPPED | OUTPATIENT
Start: 2019-09-26 | End: 2020-12-11 | Stop reason: SDUPTHER

## 2020-02-25 ENCOUNTER — TELEPHONE (OUTPATIENT)
Dept: HEMATOLOGY ONCOLOGY | Facility: CLINIC | Age: 70
End: 2020-02-25

## 2020-02-25 NOTE — TELEPHONE ENCOUNTER
Patient called stating that she decided to keep her care with Dr Marisa Rashid and the [facility]   She was schedule 6/23 11:40 am

## 2020-02-25 NOTE — TELEPHONE ENCOUNTER
Called patient and left a message explainning Dr Viviana Weaver will no longer be working out of the [facility] and I scheduled her appt for the same day same time with Dr Martin Lemons   I also left the phone number if she decides to stay with dr Carlos Finney in [place]

## 2020-04-28 ENCOUNTER — TELEMEDICINE (OUTPATIENT)
Dept: INTERNAL MEDICINE CLINIC | Facility: CLINIC | Age: 70
End: 2020-04-28
Payer: COMMERCIAL

## 2020-04-28 ENCOUNTER — APPOINTMENT (OUTPATIENT)
Dept: LAB | Facility: HOSPITAL | Age: 70
End: 2020-04-28
Attending: INTERNAL MEDICINE
Payer: COMMERCIAL

## 2020-04-28 DIAGNOSIS — R30.0 DYSURIA: ICD-10-CM

## 2020-04-28 DIAGNOSIS — M89.9 DISORDER OF BONE: Primary | ICD-10-CM

## 2020-04-28 LAB
BACTERIA UR QL AUTO: ABNORMAL /HPF
BILIRUB UR QL STRIP: NEGATIVE
CLARITY UR: CLEAR
COLOR UR: ABNORMAL
GLUCOSE UR STRIP-MCNC: NEGATIVE MG/DL
HGB UR QL STRIP.AUTO: ABNORMAL
KETONES UR STRIP-MCNC: NEGATIVE MG/DL
LEUKOCYTE ESTERASE UR QL STRIP: ABNORMAL
NITRITE UR QL STRIP: NEGATIVE
NON-SQ EPI CELLS URNS QL MICRO: ABNORMAL /HPF
PH UR STRIP.AUTO: 6 [PH]
PROT UR STRIP-MCNC: NEGATIVE MG/DL
RBC #/AREA URNS AUTO: ABNORMAL /HPF
SP GR UR STRIP.AUTO: <=1.005 (ref 1–1.03)
UROBILINOGEN UR QL STRIP.AUTO: 0.2 E.U./DL
WBC #/AREA URNS AUTO: ABNORMAL /HPF

## 2020-04-28 PROCEDURE — 87086 URINE CULTURE/COLONY COUNT: CPT

## 2020-04-28 PROCEDURE — 99441 PR PHYS/QHP TELEPHONE EVALUATION 5-10 MIN: CPT | Performed by: INTERNAL MEDICINE

## 2020-04-28 PROCEDURE — 81001 URINALYSIS AUTO W/SCOPE: CPT | Performed by: INTERNAL MEDICINE

## 2020-04-28 RX ORDER — SULFAMETHOXAZOLE AND TRIMETHOPRIM 800; 160 MG/1; MG/1
1 TABLET ORAL 2 TIMES DAILY
Qty: 6 TABLET | Refills: 0 | Status: SHIPPED | OUTPATIENT
Start: 2020-04-28 | End: 2020-05-01

## 2020-05-01 LAB
BACTERIA UR CULT: ABNORMAL
BACTERIA UR CULT: ABNORMAL

## 2020-06-12 ENCOUNTER — TELEPHONE (OUTPATIENT)
Dept: HEMATOLOGY ONCOLOGY | Facility: MEDICAL CENTER | Age: 70
End: 2020-06-12

## 2020-06-15 ENCOUNTER — TELEPHONE (OUTPATIENT)
Dept: HEMATOLOGY ONCOLOGY | Facility: CLINIC | Age: 70
End: 2020-06-15

## 2020-06-15 ENCOUNTER — APPOINTMENT (OUTPATIENT)
Dept: LAB | Facility: CLINIC | Age: 70
End: 2020-06-15
Payer: COMMERCIAL

## 2020-06-15 DIAGNOSIS — D47.2 MGUS (MONOCLONAL GAMMOPATHY OF UNKNOWN SIGNIFICANCE): ICD-10-CM

## 2020-06-15 LAB
ALBUMIN SERPL BCP-MCNC: 3.8 G/DL (ref 3.5–5)
ALP SERPL-CCNC: 56 U/L (ref 46–116)
ALT SERPL W P-5'-P-CCNC: 30 U/L (ref 12–78)
ANION GAP SERPL CALCULATED.3IONS-SCNC: 3 MMOL/L (ref 4–13)
AST SERPL W P-5'-P-CCNC: 48 U/L (ref 5–45)
BASOPHILS # BLD AUTO: 0.04 THOUSANDS/ΜL (ref 0–0.1)
BASOPHILS NFR BLD AUTO: 1 % (ref 0–1)
BILIRUB SERPL-MCNC: 0.55 MG/DL (ref 0.2–1)
BUN SERPL-MCNC: 19 MG/DL (ref 5–25)
CALCIUM SERPL-MCNC: 8.9 MG/DL (ref 8.3–10.1)
CHLORIDE SERPL-SCNC: 109 MMOL/L (ref 100–108)
CO2 SERPL-SCNC: 29 MMOL/L (ref 21–32)
CREAT SERPL-MCNC: 0.88 MG/DL (ref 0.6–1.3)
EOSINOPHIL # BLD AUTO: 0.12 THOUSAND/ΜL (ref 0–0.61)
EOSINOPHIL NFR BLD AUTO: 2 % (ref 0–6)
ERYTHROCYTE [DISTWIDTH] IN BLOOD BY AUTOMATED COUNT: 11.8 % (ref 11.6–15.1)
GFR SERPL CREATININE-BSD FRML MDRD: 68 ML/MIN/1.73SQ M
GLUCOSE P FAST SERPL-MCNC: 91 MG/DL (ref 65–99)
HCT VFR BLD AUTO: 41.2 % (ref 34.8–46.1)
HGB BLD-MCNC: 13.7 G/DL (ref 11.5–15.4)
IGA SERPL-MCNC: 200 MG/DL (ref 70–400)
IGG SERPL-MCNC: 1090 MG/DL (ref 700–1600)
IGM SERPL-MCNC: 91 MG/DL (ref 40–230)
IMM GRANULOCYTES # BLD AUTO: 0.01 THOUSAND/UL (ref 0–0.2)
IMM GRANULOCYTES NFR BLD AUTO: 0 % (ref 0–2)
LYMPHOCYTES # BLD AUTO: 2.13 THOUSANDS/ΜL (ref 0.6–4.47)
LYMPHOCYTES NFR BLD AUTO: 42 % (ref 14–44)
MCH RBC QN AUTO: 32.2 PG (ref 26.8–34.3)
MCHC RBC AUTO-ENTMCNC: 33.3 G/DL (ref 31.4–37.4)
MCV RBC AUTO: 97 FL (ref 82–98)
MONOCYTES # BLD AUTO: 0.35 THOUSAND/ΜL (ref 0.17–1.22)
MONOCYTES NFR BLD AUTO: 7 % (ref 4–12)
NEUTROPHILS # BLD AUTO: 2.39 THOUSANDS/ΜL (ref 1.85–7.62)
NEUTS SEG NFR BLD AUTO: 48 % (ref 43–75)
NRBC BLD AUTO-RTO: 0 /100 WBCS
PLATELET # BLD AUTO: 252 THOUSANDS/UL (ref 149–390)
PMV BLD AUTO: 9.8 FL (ref 8.9–12.7)
POTASSIUM SERPL-SCNC: 4.4 MMOL/L (ref 3.5–5.3)
PROT SERPL-MCNC: 7.5 G/DL (ref 6.4–8.2)
RBC # BLD AUTO: 4.26 MILLION/UL (ref 3.81–5.12)
SODIUM SERPL-SCNC: 141 MMOL/L (ref 136–145)
WBC # BLD AUTO: 5.04 THOUSAND/UL (ref 4.31–10.16)

## 2020-06-15 PROCEDURE — 36415 COLL VENOUS BLD VENIPUNCTURE: CPT

## 2020-06-15 PROCEDURE — 82232 ASSAY OF BETA-2 PROTEIN: CPT

## 2020-06-15 PROCEDURE — 83883 ASSAY NEPHELOMETRY NOT SPEC: CPT

## 2020-06-15 PROCEDURE — 86334 IMMUNOFIX E-PHORESIS SERUM: CPT

## 2020-06-15 PROCEDURE — 84165 PROTEIN E-PHORESIS SERUM: CPT

## 2020-06-15 PROCEDURE — 84165 PROTEIN E-PHORESIS SERUM: CPT | Performed by: PATHOLOGY

## 2020-06-15 PROCEDURE — 85025 COMPLETE CBC W/AUTO DIFF WBC: CPT

## 2020-06-15 PROCEDURE — 80053 COMPREHEN METABOLIC PANEL: CPT

## 2020-06-15 PROCEDURE — 86334 IMMUNOFIX E-PHORESIS SERUM: CPT | Performed by: PATHOLOGY

## 2020-06-15 PROCEDURE — 82784 ASSAY IGA/IGD/IGG/IGM EACH: CPT

## 2020-06-16 LAB
KAPPA LC FREE SER-MCNC: 13.3 MG/L (ref 3.3–19.4)
KAPPA LC FREE/LAMBDA FREE SER: 1.36 {RATIO} (ref 0.26–1.65)
LAMBDA LC FREE SERPL-MCNC: 9.8 MG/L (ref 5.7–26.3)

## 2020-06-17 LAB
ALBUMIN SERPL ELPH-MCNC: 4.35 G/DL (ref 3.5–5)
ALBUMIN SERPL ELPH-MCNC: 61.3 % (ref 52–65)
ALPHA1 GLOB SERPL ELPH-MCNC: 0.25 G/DL (ref 0.1–0.4)
ALPHA1 GLOB SERPL ELPH-MCNC: 3.5 % (ref 2.5–5)
ALPHA2 GLOB SERPL ELPH-MCNC: 0.69 G/DL (ref 0.4–1.2)
ALPHA2 GLOB SERPL ELPH-MCNC: 9.7 % (ref 7–13)
B2 MICROGLOB SERPL-MCNC: 1.8 MG/L (ref 0.6–2.4)
BETA GLOB ABNORMAL SERPL ELPH-MCNC: 0.39 G/DL (ref 0.4–0.8)
BETA1 GLOB SERPL ELPH-MCNC: 5.5 % (ref 5–13)
BETA2 GLOB SERPL ELPH-MCNC: 5.1 % (ref 2–8)
BETA2+GAMMA GLOB SERPL ELPH-MCNC: 0.36 G/DL (ref 0.2–0.5)
GAMMA GLOB ABNORMAL SERPL ELPH-MCNC: 1.06 G/DL (ref 0.5–1.6)
GAMMA GLOB SERPL ELPH-MCNC: 14.9 % (ref 12–22)
IGG/ALB SER: 1.58 {RATIO} (ref 1.1–1.8)
INTERPRETATION UR IFE-IMP: NORMAL
M PROTEIN 1 MFR SERPL ELPH: 3.4 %
M PROTEIN 1 SERPL ELPH-MCNC: 0.24 G/DL
PROT PATTERN SERPL ELPH-IMP: ABNORMAL
PROT SERPL-MCNC: 7.1 G/DL (ref 6.4–8.2)

## 2020-06-23 ENCOUNTER — OFFICE VISIT (OUTPATIENT)
Dept: HEMATOLOGY ONCOLOGY | Facility: CLINIC | Age: 70
End: 2020-06-23
Payer: COMMERCIAL

## 2020-06-23 VITALS
TEMPERATURE: 98.2 F | OXYGEN SATURATION: 98 % | HEIGHT: 60 IN | SYSTOLIC BLOOD PRESSURE: 110 MMHG | DIASTOLIC BLOOD PRESSURE: 60 MMHG | HEART RATE: 56 BPM | BODY MASS INDEX: 20.22 KG/M2 | WEIGHT: 103 LBS | RESPIRATION RATE: 16 BRPM

## 2020-06-23 DIAGNOSIS — D47.2 MGUS (MONOCLONAL GAMMOPATHY OF UNKNOWN SIGNIFICANCE): Primary | ICD-10-CM

## 2020-06-23 PROCEDURE — 1160F RVW MEDS BY RX/DR IN RCRD: CPT | Performed by: INTERNAL MEDICINE

## 2020-06-23 PROCEDURE — 4040F PNEUMOC VAC/ADMIN/RCVD: CPT | Performed by: INTERNAL MEDICINE

## 2020-06-23 PROCEDURE — 99213 OFFICE O/P EST LOW 20 MIN: CPT | Performed by: INTERNAL MEDICINE

## 2020-06-23 PROCEDURE — 3008F BODY MASS INDEX DOCD: CPT | Performed by: INTERNAL MEDICINE

## 2020-06-23 PROCEDURE — 1036F TOBACCO NON-USER: CPT | Performed by: INTERNAL MEDICINE

## 2020-06-23 RX ORDER — CHOLECALCIFEROL (VITAMIN D3) 125 MCG
500 CAPSULE ORAL DAILY
COMMUNITY
End: 2021-12-01 | Stop reason: ALTCHOICE

## 2020-07-14 ENCOUNTER — TELEPHONE (OUTPATIENT)
Dept: INTERNAL MEDICINE CLINIC | Facility: CLINIC | Age: 70
End: 2020-07-14

## 2020-07-15 ENCOUNTER — OFFICE VISIT (OUTPATIENT)
Dept: INTERNAL MEDICINE CLINIC | Facility: CLINIC | Age: 70
End: 2020-07-15
Payer: COMMERCIAL

## 2020-07-15 VITALS
WEIGHT: 104 LBS | SYSTOLIC BLOOD PRESSURE: 110 MMHG | BODY MASS INDEX: 20.42 KG/M2 | OXYGEN SATURATION: 97 % | HEART RATE: 63 BPM | TEMPERATURE: 98.4 F | DIASTOLIC BLOOD PRESSURE: 70 MMHG | HEIGHT: 60 IN

## 2020-07-15 DIAGNOSIS — Z00.00 HEALTHCARE MAINTENANCE: ICD-10-CM

## 2020-07-15 PROCEDURE — 3288F FALL RISK ASSESSMENT DOCD: CPT | Performed by: INTERNAL MEDICINE

## 2020-07-15 PROCEDURE — 99397 PER PM REEVAL EST PAT 65+ YR: CPT | Performed by: INTERNAL MEDICINE

## 2020-07-15 PROCEDURE — 1101F PT FALLS ASSESS-DOCD LE1/YR: CPT | Performed by: INTERNAL MEDICINE

## 2020-07-15 PROCEDURE — 1160F RVW MEDS BY RX/DR IN RCRD: CPT | Performed by: INTERNAL MEDICINE

## 2020-07-15 NOTE — PROGRESS NOTES
Assessment/Plan:       Diagnoses and all orders for this visit:    Healthcare maintenance  -     Lipid Panel with Direct LDL reflex; Future  -     TSH, 3rd generation with Free T4 reflex; Future                Subjective:      Patient ID: Maritza Figueroa is a 81 y o  female  A 82-year-old female    Monoclonal gammopathy of uncertain significance followed by Hematology  Steatosis without clinical sequelae  Hyperlipidemia; treated with Lipitor  History of migraine headaches; not very active  The patient uses some tripped and rarely but manages to stay ahead of this by keeping herself hydrated    Colonoscopy up-to-date  Last time February 2017 was clean  Due to a prior history colon polyps should be done again in 2022    Mammogram up-to-date              The following portions of the patient's history were reviewed and updated as appropriate:   She has a past medical history of H/O nonmelanoma skin cancer  ,  does not have any pertinent problems on file  ,   has a past surgical history that includes Colonoscopy  ,  family history is not on file  ,   reports that she has never smoked  She has never used smokeless tobacco  She reports that she drinks alcohol  She reports that she does not use drugs  ,  is allergic to dust mite extract and pollen extract     Current Outpatient Medications   Medication Sig Dispense Refill    aspirin 81 MG tablet Take by mouth      atorvastatin (LIPITOR) 20 mg tablet TAKE 1 TABLET BY MOUTH EVERY DAY 90 tablet 3    Calcium Carbonate-Vitamin D (CALCIUM 600+D) 600-200 MG-UNIT TABS Take by mouth      multivitamin (THERAGRAN) TABS Take by mouth      olopatadine HCl (PATADAY) 0 2 % opth drops INSTILL 1 DROP INTO AFFECTED EYES DAILY      SUMAtriptan (IMITREX) 100 mg tablet TAKE 1 TABLET BY MOUTH ONCE DAILY AS NEEDED MAY REPEAT DOSE IN 2 HOURS   5    vitamin B-12 (VITAMIN B-12) 500 mcg tablet Take 500 mcg by mouth daily       No current facility-administered medications for this visit  Review of Systems   Constitutional: Negative for chills and fever  HENT: Negative for sore throat and trouble swallowing  Eyes: Negative for pain  Respiratory: Negative for cough, shortness of breath and wheezing  Cardiovascular: Negative for chest pain and leg swelling  Gastrointestinal: Negative for abdominal pain, diarrhea, nausea and vomiting  Endocrine: Negative for cold intolerance and heat intolerance  Genitourinary: Negative for dysuria, frequency and pelvic pain  Musculoskeletal: Negative for arthralgias and joint swelling  Skin: Negative for rash and wound  Allergic/Immunologic: Negative for immunocompromised state  Neurological: Negative for dizziness, seizures, syncope and headaches  Psychiatric/Behavioral: Negative for dysphoric mood  The patient is not nervous/anxious  Objective:  Vitals:    07/15/20 1100   BP: 110/70   Pulse: 63   Temp: 98 4 °F (36 9 °C)   SpO2: 97%      Physical Exam   Constitutional: She is oriented to person, place, and time  She appears well-developed and well-nourished  HENT:   Head: Normocephalic and atraumatic  Eyes: Pupils are equal, round, and reactive to light  EOM are normal    Neck: Normal range of motion  Neck supple  No tracheal deviation present  No thyromegaly present  Cardiovascular: Normal rate, regular rhythm and normal heart sounds  Exam reveals no gallop  No murmur heard  Pulmonary/Chest: No respiratory distress  She has no wheezes  She has no rales  Abdominal: Soft  Bowel sounds are normal  There is no tenderness  Musculoskeletal: Normal range of motion  She exhibits no tenderness or deformity  Neurological: She is alert and oriented to person, place, and time  Coordination normal    Skin: Skin is warm  Psychiatric: She has a normal mood and affect  Judgment normal          Patient Instructions    A well visit   Screening lab  Follow-up in a year

## 2020-08-19 ENCOUNTER — OFFICE VISIT (OUTPATIENT)
Dept: INTERNAL MEDICINE CLINIC | Facility: CLINIC | Age: 70
End: 2020-08-19
Payer: COMMERCIAL

## 2020-08-19 VITALS
BODY MASS INDEX: 20.22 KG/M2 | DIASTOLIC BLOOD PRESSURE: 70 MMHG | HEIGHT: 60 IN | TEMPERATURE: 98.5 F | HEART RATE: 65 BPM | SYSTOLIC BLOOD PRESSURE: 100 MMHG | WEIGHT: 103 LBS | OXYGEN SATURATION: 97 %

## 2020-08-19 DIAGNOSIS — T63.441A BEE STING, ACCIDENTAL OR UNINTENTIONAL, INITIAL ENCOUNTER: ICD-10-CM

## 2020-08-19 DIAGNOSIS — G43.909 MIGRAINE WITHOUT STATUS MIGRAINOSUS, NOT INTRACTABLE, UNSPECIFIED MIGRAINE TYPE: ICD-10-CM

## 2020-08-19 PROCEDURE — 3008F BODY MASS INDEX DOCD: CPT | Performed by: INTERNAL MEDICINE

## 2020-08-19 PROCEDURE — 1160F RVW MEDS BY RX/DR IN RCRD: CPT | Performed by: INTERNAL MEDICINE

## 2020-08-19 PROCEDURE — 99213 OFFICE O/P EST LOW 20 MIN: CPT | Performed by: INTERNAL MEDICINE

## 2020-08-19 PROCEDURE — 3008F BODY MASS INDEX DOCD: CPT | Performed by: DERMATOLOGY

## 2020-08-19 PROCEDURE — 4040F PNEUMOC VAC/ADMIN/RCVD: CPT | Performed by: INTERNAL MEDICINE

## 2020-08-19 PROCEDURE — 1036F TOBACCO NON-USER: CPT | Performed by: INTERNAL MEDICINE

## 2020-08-19 RX ORDER — METHYLPREDNISOLONE 4 MG/1
TABLET ORAL
Qty: 21 EACH | Refills: 0 | Status: SHIPPED | OUTPATIENT
Start: 2020-08-19 | End: 2020-12-10

## 2020-08-19 RX ORDER — SUMATRIPTAN 100 MG/1
TABLET, FILM COATED ORAL
Qty: 10 TABLET | Refills: 5 | Status: SHIPPED | OUTPATIENT
Start: 2020-08-19

## 2020-08-19 RX ORDER — SUMATRIPTAN 100 MG/1
TABLET, FILM COATED ORAL
Refills: 5 | Status: CANCELLED | OUTPATIENT
Start: 2020-08-19

## 2020-08-19 NOTE — PROGRESS NOTES
Assessment/Plan:       Diagnoses and all orders for this visit:    Bee sting, accidental or unintentional, initial encounter  -     methylPREDNISolone 4 MG tablet therapy pack; Use as directed on package    Migraine without status migrainosus, not intractable, unspecified migraine type  -     SUMAtriptan (IMITREX) 100 mg tablet; 1 tablet as needed for headache                Subjective:      Patient ID: Austen Dexter is a 81 y o  female  A bee sting 2 days ago with continued swelling of the right hand  A tiny bit painful  No systemic symptoms  No paresthesia or loss of function  requests renewal of Imitrex      The following portions of the patient's history were reviewed and updated as appropriate:   She has a past medical history of H/O nonmelanoma skin cancer  ,  does not have any pertinent problems on file  ,   has a past surgical history that includes Colonoscopy  ,  family history is not on file  ,   reports that she has never smoked  She has never used smokeless tobacco  She reports current alcohol use  She reports that she does not use drugs  ,  is allergic to dust mite extract and pollen extract     Current Outpatient Medications   Medication Sig Dispense Refill    aspirin 81 MG tablet Take by mouth      atorvastatin (LIPITOR) 20 mg tablet TAKE 1 TABLET BY MOUTH EVERY DAY 90 tablet 3    Calcium Carbonate-Vitamin D (CALCIUM 600+D) 600-200 MG-UNIT TABS Take by mouth      multivitamin (THERAGRAN) TABS Take by mouth      olopatadine HCl (PATADAY) 0 2 % opth drops INSTILL 1 DROP INTO AFFECTED EYES DAILY      SUMAtriptan (IMITREX) 100 mg tablet 1 tablet as needed for headache 10 tablet 5    vitamin B-12 (VITAMIN B-12) 500 mcg tablet Take 500 mcg by mouth daily      methylPREDNISolone 4 MG tablet therapy pack Use as directed on package 21 each 0     No current facility-administered medications for this visit          Review of Systems   Musculoskeletal:          Swelling of dorsum right hand   All other systems reviewed and are negative  Objective:  Vitals:    08/19/20 1108   BP: 100/70   Pulse: 65   Temp: 98 5 °F (36 9 °C)   SpO2: 97%      Physical Exam  Cardiovascular:      Rate and Rhythm: Normal rate  Pulmonary:      Effort: Pulmonary effort is normal       Breath sounds: Normal breath sounds  Skin:     General: Skin is warm  Comments:  Superficial swelling lateral aspect dorsal right hand in particular   Neurological:      General: No focal deficit present  Mental Status: She is alert  Patient Instructions    A bee sting with continued swelling will be treated with a Medrol Dosepak      Migraine med renewed

## 2020-08-24 ENCOUNTER — OFFICE VISIT (OUTPATIENT)
Dept: DERMATOLOGY | Facility: CLINIC | Age: 70
End: 2020-08-24
Payer: COMMERCIAL

## 2020-08-24 VITALS — TEMPERATURE: 98.2 F

## 2020-08-24 DIAGNOSIS — Z13.89 SCREENING FOR SKIN CONDITION: ICD-10-CM

## 2020-08-24 DIAGNOSIS — Z85.828 HISTORY OF SKIN CANCER: ICD-10-CM

## 2020-08-24 DIAGNOSIS — L82.1 SEBORRHEIC KERATOSIS: Primary | ICD-10-CM

## 2020-08-24 PROCEDURE — 1036F TOBACCO NON-USER: CPT | Performed by: DERMATOLOGY

## 2020-08-24 PROCEDURE — 4040F PNEUMOC VAC/ADMIN/RCVD: CPT | Performed by: DERMATOLOGY

## 2020-08-24 PROCEDURE — 1160F RVW MEDS BY RX/DR IN RCRD: CPT | Performed by: DERMATOLOGY

## 2020-08-24 PROCEDURE — 99213 OFFICE O/P EST LOW 20 MIN: CPT | Performed by: DERMATOLOGY

## 2020-08-24 NOTE — PROGRESS NOTES
[address]  [address] [facility]  Leora Dejesus [address] [place] [id number]  [phone]  [phone]     MRN: [id number] : 1950  Encounter: [id number]  Patient Information: Priyanka De La Torre  Chief complaint:  Yearly skin check    History of present illness:  82-year-old female presents for overall skin check previous history of skin cancer no specific concerns or changes noted  Past Medical History:   Diagnosis Date    H/O nonmelanoma skin cancer     last assessed 17     Past Surgical History:   Procedure Laterality Date    COLONOSCOPY      last assessed 14, next in 2016     Social History   Social History     Substance and Sexual Activity   Alcohol Use Yes    Frequency: 2-4 times a month    Drinks per session: 1 or 2    Binge frequency: Never    Comment: minimum     Social History     Substance and Sexual Activity   Drug Use No     Social History     Tobacco Use   Smoking Status Never Smoker   Smokeless Tobacco Never Used     No family history on file  Meds/Allergies   Allergies   Allergen Reactions    Dust Mite Extract     Pollen Extract        Meds:  Prior to Admission medications    Medication Sig Start Date End Date Taking?  Authorizing Provider   aspirin 81 MG tablet Take by mouth   Yes Historical Provider, MD   atorvastatin (LIPITOR) 20 mg tablet TAKE 1 TABLET BY MOUTH EVERY DAY 19  Yes Ginny Gardner MD   Calcium Carbonate-Vitamin D (CALCIUM 600+D) 600-200 MG-UNIT TABS Take by mouth   Yes Historical Provider, MD   methylPREDNISolone 4 MG tablet therapy pack Use as directed on package 20  Yes Ginny Gardner MD   multivitamin [facility]) TABS Take by mouth   Yes Historical Provider, MD   olopatadine HCl (PATADAY) 0 2 % opth drops INSTILL 1 DROP INTO AFFECTED EYES DAILY 17  Yes Historical Provider, MD   SUMAtriptan (IMITREX) 100 mg tablet 1 tablet as needed for headache 20  Yes Ginny Gardner MD   vitamin B-12 (VITAMIN B-12) 500 mcg tablet Take 500 mcg by mouth daily   Yes Historical Provider, MD       Subjective:     Review of Systems:    General: negative for - chills, fatigue, fever,  weight gain or weight loss  Psychological: negative for - anxiety, behavioral disorder, concentration difficulties, decreased libido, depression, irritability, memory difficulties, mood swings, sleep disturbances or suicidal ideation  ENT: negative for - hearing difficulties , nasal congestion, nasal discharge, oral lesions, sinus pain, sneezing, sore throat  Allergy and Immunology: negative for - hives, insect bite sensitivity,  Hematological and Lymphatic: negative for - bleeding problems, blood clots,bruising, swollen lymph nodes  Endocrine: negative for - hair pattern changes, hot flashes, malaise/lethargy, mood swings, palpitations, polydipsia/polyuria, skin changes, temperature intolerance or unexpected weight change  Respiratory: negative for - cough, hemoptysis, orthopnea, shortness of breath, or wheezing  Cardiovascular: negative for - chest pain, dyspnea on exertion, edema,  Gastrointestinal: negative for - abdominal pain, nausea/vomiting  Genito-Urinary: negative for - dysuria, incontinence, irregular/heavy menses or urinary frequency/urgency  Musculoskeletal: negative for - gait disturbance, joint pain, joint stiffness, joint swelling, muscle pain, muscular weakness  Dermatological:  As in HPI  Neurological: negative for confusion, dizziness, headaches, impaired coordination/balance, memory loss, numbness/tingling, seizures, speech problems, tremors or weakness       Objective:   Temp 98 2 °F (36 8 °C)     Physical Exam:    General Appearance:    Alert, cooperative, no distress   Head:    Normocephalic, without obvious abnormality, atraumatic           Skin:   A full skin exam was performed including scalp, head scalp, eyes, ears, nose, lips, neck, chest, axilla, abdomen, back, buttocks, bilateral upper extremities, bilateral lower extremities, hands, feet, fingers, toes, fingernails, and toenails normal keratotic papules greasy stuck appearance nothing else remarkable noted on complete exam     Assessment:     1  Seborrheic keratosis     2  Screening for skin condition     3  History of skin cancer           Plan:   Seborrheic keratosis patient reassured these are normal growths we acquire with age no treatment needed  History of skin cancer in no recurrence nothing else atypical sunblock recommended follow-up in 1 year  Screening for dermatologic disorders nothing else of concern noted on complete exam follow-up in 1 year      Son Allen MD  8/24/2020,9:19 AM    Portions of the record may have been created with voice recognition software   Occasional wrong word or "sound a like" substitutions may have occurred due to the inherent limitations of voice recognition software   Read the chart carefully and recognize, using context, where substitutions have occurred

## 2020-12-08 ENCOUNTER — TELEMEDICINE (OUTPATIENT)
Dept: INTERNAL MEDICINE CLINIC | Facility: CLINIC | Age: 70
End: 2020-12-08
Payer: COMMERCIAL

## 2020-12-08 ENCOUNTER — TELEPHONE (OUTPATIENT)
Dept: INTERNAL MEDICINE CLINIC | Facility: CLINIC | Age: 70
End: 2020-12-08

## 2020-12-08 DIAGNOSIS — E78.2 MIXED HYPERLIPIDEMIA: ICD-10-CM

## 2020-12-08 DIAGNOSIS — R19.7 DIARRHEA, UNSPECIFIED TYPE: ICD-10-CM

## 2020-12-08 DIAGNOSIS — B34.9 VIRAL INFECTION, UNSPECIFIED: ICD-10-CM

## 2020-12-08 DIAGNOSIS — R52 ACHING: ICD-10-CM

## 2020-12-08 DIAGNOSIS — R51.9 NONINTRACTABLE HEADACHE, UNSPECIFIED CHRONICITY PATTERN, UNSPECIFIED HEADACHE TYPE: ICD-10-CM

## 2020-12-08 DIAGNOSIS — R68.83 CHILLS: Primary | ICD-10-CM

## 2020-12-08 PROCEDURE — 99213 OFFICE O/P EST LOW 20 MIN: CPT | Performed by: PHYSICIAN ASSISTANT

## 2020-12-09 DIAGNOSIS — B34.9 VIRAL INFECTION, UNSPECIFIED: ICD-10-CM

## 2020-12-09 PROCEDURE — U0003 INFECTIOUS AGENT DETECTION BY NUCLEIC ACID (DNA OR RNA); SEVERE ACUTE RESPIRATORY SYNDROME CORONAVIRUS 2 (SARS-COV-2) (CORONAVIRUS DISEASE [COVID-19]), AMPLIFIED PROBE TECHNIQUE, MAKING USE OF HIGH THROUGHPUT TECHNOLOGIES AS DESCRIBED BY CMS-2020-01-R: HCPCS | Performed by: PHYSICIAN ASSISTANT

## 2020-12-10 ENCOUNTER — TELEPHONE (OUTPATIENT)
Dept: INTERNAL MEDICINE CLINIC | Facility: CLINIC | Age: 70
End: 2020-12-10

## 2020-12-10 ENCOUNTER — TELEMEDICINE (OUTPATIENT)
Dept: INTERNAL MEDICINE CLINIC | Facility: CLINIC | Age: 70
End: 2020-12-10
Payer: COMMERCIAL

## 2020-12-10 DIAGNOSIS — F51.02 ADJUSTMENT INSOMNIA: Primary | ICD-10-CM

## 2020-12-10 PROBLEM — F41.9 ANXIETY: Status: ACTIVE | Noted: 2020-12-10

## 2020-12-10 LAB — SARS-COV-2 RNA SPEC QL NAA+PROBE: NOT DETECTED

## 2020-12-10 PROCEDURE — 1160F RVW MEDS BY RX/DR IN RCRD: CPT | Performed by: INTERNAL MEDICINE

## 2020-12-10 PROCEDURE — 99213 OFFICE O/P EST LOW 20 MIN: CPT | Performed by: INTERNAL MEDICINE

## 2020-12-10 RX ORDER — ZOLPIDEM TARTRATE 5 MG/1
5 TABLET ORAL
Qty: 10 TABLET | Refills: 2 | Status: SHIPPED | OUTPATIENT
Start: 2020-12-10 | End: 2020-12-24

## 2020-12-11 ENCOUNTER — TELEPHONE (OUTPATIENT)
Dept: INTERNAL MEDICINE CLINIC | Facility: CLINIC | Age: 70
End: 2020-12-11

## 2020-12-11 DIAGNOSIS — E78.2 MIXED HYPERLIPIDEMIA: Primary | ICD-10-CM

## 2020-12-11 RX ORDER — ATORVASTATIN CALCIUM 20 MG/1
20 TABLET, FILM COATED ORAL DAILY
Qty: 90 TABLET | Refills: 3 | Status: SHIPPED | OUTPATIENT
Start: 2020-12-11 | End: 2022-01-20 | Stop reason: SDUPTHER

## 2020-12-14 ENCOUNTER — TELEPHONE (OUTPATIENT)
Dept: INTERNAL MEDICINE CLINIC | Facility: CLINIC | Age: 70
End: 2020-12-14

## 2020-12-14 NOTE — TELEPHONE ENCOUNTER
Patient needs her COVID test results sent to [facility]    Attention: Mr Heydi Xavier  [address]: [phone]

## 2020-12-24 ENCOUNTER — TELEMEDICINE (OUTPATIENT)
Dept: INTERNAL MEDICINE CLINIC | Facility: CLINIC | Age: 70
End: 2020-12-24
Payer: COMMERCIAL

## 2020-12-24 ENCOUNTER — TELEPHONE (OUTPATIENT)
Dept: INTERNAL MEDICINE CLINIC | Facility: CLINIC | Age: 70
End: 2020-12-24

## 2020-12-24 DIAGNOSIS — F51.02 ADJUSTMENT INSOMNIA: Primary | ICD-10-CM

## 2020-12-24 PROCEDURE — 1160F RVW MEDS BY RX/DR IN RCRD: CPT | Performed by: INTERNAL MEDICINE

## 2020-12-24 PROCEDURE — 99212 OFFICE O/P EST SF 10 MIN: CPT | Performed by: INTERNAL MEDICINE

## 2020-12-24 RX ORDER — TRAZODONE HYDROCHLORIDE 50 MG/1
50 TABLET ORAL
Qty: 10 TABLET | Refills: 5 | Status: SHIPPED | OUTPATIENT
Start: 2020-12-24 | End: 2021-07-22

## 2021-01-29 ENCOUNTER — DOCUMENTATION (OUTPATIENT)
Dept: HEMATOLOGY ONCOLOGY | Facility: CLINIC | Age: 71
End: 2021-01-29

## 2021-01-29 NOTE — PROGRESS NOTES
Left message informing patient that Dr Pranay Mccabe will no longer be seeing patients in [place]  Appointment has been RS to 06/22/2021 at 11 am with [facility]  RS letter sent

## 2021-03-01 ENCOUNTER — TELEPHONE (OUTPATIENT)
Dept: HEMATOLOGY ONCOLOGY | Facility: CLINIC | Age: 71
End: 2021-03-01

## 2021-03-01 NOTE — TELEPHONE ENCOUNTER
Patient called to confirm her appointment with [facility]

## 2021-03-10 DIAGNOSIS — Z23 ENCOUNTER FOR IMMUNIZATION: ICD-10-CM

## 2021-06-21 ENCOUNTER — TELEPHONE (OUTPATIENT)
Dept: HEMATOLOGY ONCOLOGY | Facility: CLINIC | Age: 71
End: 2021-06-21

## 2021-06-21 NOTE — TELEPHONE ENCOUNTER
Appointment Cancellation Or Reschedule     Person calling in patient   Provider richard   Office Visit Date and Time 6-22-21 11am   Office Visit Location [place]   Did patient want to reschedule their office appointment? If so, when was it scheduled to? Yes 7-14-21 930   Is this patient calling to reschedule an infusion appointment? no   Is this patient a Chemo patient? no   When is their next infusion visit? n/a   Reason for Cancellation or Reschedule sched conflict     If the patient is a treatment patient, please route this to the office nurse  If the patient is not on treatment, please route to the office MA

## 2021-06-21 NOTE — TELEPHONE ENCOUNTER
Left message for patient reminding her to have labs completed prior to her appointment with Megan Escoto on  6/22  hopeline number left for questions

## 2021-07-08 ENCOUNTER — TELEPHONE (OUTPATIENT)
Dept: HEMATOLOGY ONCOLOGY | Facility: CLINIC | Age: 71
End: 2021-07-08

## 2021-07-08 ENCOUNTER — APPOINTMENT (OUTPATIENT)
Dept: LAB | Facility: CLINIC | Age: 71
End: 2021-07-08
Payer: COMMERCIAL

## 2021-07-08 DIAGNOSIS — D47.2 MGUS (MONOCLONAL GAMMOPATHY OF UNKNOWN SIGNIFICANCE): Primary | ICD-10-CM

## 2021-07-08 DIAGNOSIS — D47.2 MGUS (MONOCLONAL GAMMOPATHY OF UNKNOWN SIGNIFICANCE): ICD-10-CM

## 2021-07-08 LAB
ALBUMIN SERPL BCP-MCNC: 3.9 G/DL (ref 3.5–5)
ALP SERPL-CCNC: 50 U/L (ref 46–116)
ALT SERPL W P-5'-P-CCNC: 30 U/L (ref 12–78)
ANION GAP SERPL CALCULATED.3IONS-SCNC: 4 MMOL/L (ref 4–13)
AST SERPL W P-5'-P-CCNC: 51 U/L (ref 5–45)
BASOPHILS # BLD AUTO: 0.04 THOUSANDS/ΜL (ref 0–0.1)
BASOPHILS NFR BLD AUTO: 1 % (ref 0–1)
BILIRUB SERPL-MCNC: 0.49 MG/DL (ref 0.2–1)
BUN SERPL-MCNC: 17 MG/DL (ref 5–25)
CALCIUM SERPL-MCNC: 9.8 MG/DL (ref 8.3–10.1)
CHLORIDE SERPL-SCNC: 106 MMOL/L (ref 100–108)
CO2 SERPL-SCNC: 31 MMOL/L (ref 21–32)
CREAT SERPL-MCNC: 0.69 MG/DL (ref 0.6–1.3)
EOSINOPHIL # BLD AUTO: 0.09 THOUSAND/ΜL (ref 0–0.61)
EOSINOPHIL NFR BLD AUTO: 2 % (ref 0–6)
ERYTHROCYTE [DISTWIDTH] IN BLOOD BY AUTOMATED COUNT: 11.3 % (ref 11.6–15.1)
GFR SERPL CREATININE-BSD FRML MDRD: 90 ML/MIN/1.73SQ M
GLUCOSE P FAST SERPL-MCNC: 95 MG/DL (ref 65–99)
HCT VFR BLD AUTO: 43.2 % (ref 34.8–46.1)
HGB BLD-MCNC: 14.3 G/DL (ref 11.5–15.4)
IGA SERPL-MCNC: 199 MG/DL (ref 70–400)
IGG SERPL-MCNC: 1060 MG/DL (ref 700–1600)
IGM SERPL-MCNC: 94 MG/DL (ref 40–230)
IMM GRANULOCYTES # BLD AUTO: 0.01 THOUSAND/UL (ref 0–0.2)
IMM GRANULOCYTES NFR BLD AUTO: 0 % (ref 0–2)
LYMPHOCYTES # BLD AUTO: 1.89 THOUSANDS/ΜL (ref 0.6–4.47)
LYMPHOCYTES NFR BLD AUTO: 35 % (ref 14–44)
MCH RBC QN AUTO: 32 PG (ref 26.8–34.3)
MCHC RBC AUTO-ENTMCNC: 33.1 G/DL (ref 31.4–37.4)
MCV RBC AUTO: 97 FL (ref 82–98)
MONOCYTES # BLD AUTO: 0.39 THOUSAND/ΜL (ref 0.17–1.22)
MONOCYTES NFR BLD AUTO: 7 % (ref 4–12)
NEUTROPHILS # BLD AUTO: 2.94 THOUSANDS/ΜL (ref 1.85–7.62)
NEUTS SEG NFR BLD AUTO: 55 % (ref 43–75)
NRBC BLD AUTO-RTO: 0 /100 WBCS
PLATELET # BLD AUTO: 284 THOUSANDS/UL (ref 149–390)
PMV BLD AUTO: 10.4 FL (ref 8.9–12.7)
POTASSIUM SERPL-SCNC: 4.4 MMOL/L (ref 3.5–5.3)
PROT SERPL-MCNC: 7.3 G/DL (ref 6.4–8.2)
RBC # BLD AUTO: 4.47 MILLION/UL (ref 3.81–5.12)
SODIUM SERPL-SCNC: 141 MMOL/L (ref 136–145)
WBC # BLD AUTO: 5.36 THOUSAND/UL (ref 4.31–10.16)

## 2021-07-08 PROCEDURE — 85025 COMPLETE CBC W/AUTO DIFF WBC: CPT

## 2021-07-08 PROCEDURE — 84165 PROTEIN E-PHORESIS SERUM: CPT | Performed by: PATHOLOGY

## 2021-07-08 PROCEDURE — 82784 ASSAY IGA/IGD/IGG/IGM EACH: CPT

## 2021-07-08 PROCEDURE — 86334 IMMUNOFIX E-PHORESIS SERUM: CPT | Performed by: PATHOLOGY

## 2021-07-08 PROCEDURE — 86334 IMMUNOFIX E-PHORESIS SERUM: CPT

## 2021-07-08 PROCEDURE — 80053 COMPREHEN METABOLIC PANEL: CPT

## 2021-07-08 PROCEDURE — 82232 ASSAY OF BETA-2 PROTEIN: CPT

## 2021-07-08 PROCEDURE — 36415 COLL VENOUS BLD VENIPUNCTURE: CPT

## 2021-07-08 PROCEDURE — 84165 PROTEIN E-PHORESIS SERUM: CPT

## 2021-07-08 PROCEDURE — 83883 ASSAY NEPHELOMETRY NOT SPEC: CPT

## 2021-07-08 NOTE — TELEPHONE ENCOUNTER
lab orders re-entered into Epic  Patient called and notified of above  Dr Kamala Cummings RN notified as [employer]

## 2021-07-09 LAB
KAPPA LC FREE SER-MCNC: 13.8 MG/L (ref 3.3–19.4)
KAPPA LC FREE/LAMBDA FREE SER: 1.45 {RATIO} (ref 0.26–1.65)
LAMBDA LC FREE SERPL-MCNC: 9.5 MG/L (ref 5.7–26.3)

## 2021-07-12 NOTE — PROGRESS NOTES
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)       SUBJECTIVE      Remy Armstrong is a 78 y o  here for follow-up with me today for IgG Kappa MGUS  The patient was previously seen by Dr Daria Rae and Dr Flora Davidson and transferred to my care  Per chart review, she was diagnosed with MGUS in July 2017 and has had a small M-spike of 0 13, negative skeletal survey, no anemia, normal renal function, serum FLC WNL, and normal quantitative immunoglobulins  She has been followed at a 1 year interval      Her CBC is unremarkable:   7/8/2021    WBC 5 36   Red Blood Cell Count 4 47   Hemoglobin 14 3   HCT 43 2   MCV 97   MCH 32 0   MCHC 33 1   RDW 11 3 (L)   Platelet Count 935      7/8/2021    ALBUMIN ELP 62 9   ALBUMIN CONC ELP 4 72   ALPHA 1 3 6   ALPHA 1 CONC ELP 0 27   ALPHA 2 9 1   ALPHA 2 CONC ELP 0 68   BETA-1 5 5   BETA 1 CONC ELP 0 41   BETA-2 5 2   BETA 2 CONC ELP 0 39   GAMMA GLOBULIN 13 7   GAMMA CONC ELP 1 03   M PEAK ID 1 1 70   M PEAK 1 CONC 0 13   SPEP INTERPRETATION The SPEP shows a monoclonal peak in the gamma region  Previous immunofixation identified a monoclonal band as IgG kappa  Immunofixation Interpretation Serum immunofixation shows a monoclonal gammopathy identified as IgG kappa (0 13 g/dL)          She has normal serum FLC ratio:   7/8/2021    IMMUNOGLOBULIN KAPPA FREE LIGHT CHAIN 13 8   IMMUNOGLOBULIN LAMBDA FREE LIGHT CHAIN 9 5   KAPPA/LAMBDA FREE LIGHT CHAIN RATIO 1 45     She has normal quantitative immunoglobulins:   7/8/2021     0   IGG 1,060 0   IGM 94 0       This Visit  She is here by herself today  She voices no acute complaints  She stays active with gardening and swimming  She notes mild dizziness when she gets up in the morning which she attributes to her low blood pressure  She denies fevers, chills, unintentional weight loss, night sweats, fatigue, bone pain, headaches, lightheadedness, cough, SOB, palpitations, chest pain, abdominal tenderness/distension, nausea/vomiting, constipation/diarrhea, melena/hematochezia, hematuria, petechiae/purpura, increased ecchymosis, or LE swelling  I have reviewed the relevant past medical, surgical, social and family history  I have also reviewed allergies and medications for this patient  Review of Systems  Review of Systems   All other systems reviewed and are negative  OBJECTIVE     Physical Exam  Vitals:    07/14/21 0932   BP: (!) 102/44   BP Location: Left arm   Patient Position: Sitting   Cuff Size: Adult   Pulse: 62   Resp: 16   Temp: 98 °F (36 7 °C)   SpO2: 100%   Weight: 46 7 kg (103 lb)   Height: 5' (1 524 m)       Physical Exam  Vitals reviewed  Constitutional:       General: She is not in acute distress  Appearance: Normal appearance  She is normal weight  She is not ill-appearing, toxic-appearing or diaphoretic  Comments: Pleasant 62-year-old female sitting comfortably on an exam room chair  HENT:      Head: Normocephalic and atraumatic  Eyes:      Extraocular Movements: Extraocular movements intact  Conjunctiva/sclera: Conjunctivae normal       Pupils: Pupils are equal, round, and reactive to light  Comments: No conjunctival pallor  Cardiovascular:      Rate and Rhythm: Normal rate and regular rhythm  Pulses: Normal pulses  Heart sounds: Normal heart sounds  No murmur heard  No friction rub  No gallop  Pulmonary:      Effort: Pulmonary effort is normal  No respiratory distress  Breath sounds: Normal breath sounds  No wheezing or rales  Abdominal:      General: Bowel sounds are normal  There is no distension  Palpations: Abdomen is soft  There is no mass  Tenderness: There is no abdominal tenderness  There is no guarding or rebound  Musculoskeletal:         General: No swelling or tenderness  Normal range of motion  Cervical back: Normal range of motion and neck supple  No rigidity  Right lower leg: No edema  Left lower leg: No edema  Lymphadenopathy:      Cervical: No cervical adenopathy  Skin:     General: Skin is warm and dry  Coloration: Skin is not jaundiced or pale  Findings: No bruising, erythema or rash  Neurological:      General: No focal deficit present  Mental Status: She is alert  Mental status is at baseline  Psychiatric:         Mood and Affect: Mood normal          Behavior: Behavior normal           Imaging  Relevant imaging reviewed in chart    Labs  Relevant labs reviewed in chart      7/8/2021    Creatinine 0 69   Calcium 9 8   Total Protein 7 3   Albumin 3 9       ASSESSMENT & PLAN      Diagnosis ICD-10-CM Associated Orders   1  MGUS (monoclonal gammopathy of unknown significance)  D47 2 Comprehensive metabolic panel     CBC and Platelet     IgG, IgA, IgM     Immunoglobulin free LT chains blood     Protein electrophoresis, serum       I discussed Franklin Julian's case with Dr Caitlin Marie and we formulated the plan together  78 y o  female seen in follow-up for IgG Kappa MGUS  Discussion   I extensively reviewed her blood work with her  I also reviewed the pathophysiology, diagnosis, management, and prognosis of MGUS  I explained that 1% of patients with MGUS may experience disease transformation to Multiple Myeloma, which is the reasoning for regular surveillance with blood work      Given that she has had a longstanding history of IgG Kappa, very mild M-spike (0 13 g/dL) normal renal function, absence of anemia, normal quantitative immunoglobulins, and normal serum FLC ratio, her disease is consistent with low-risk MGUS  Thus, bone marrow biopsy is not indicated at this time for further investigation and we recommend continued surveillance of her MGUS  I answered all her questions and she voiced understanding  Plan/Labs  · CMP, CBC, Quantitative immunoglobulins, serum FLC, and SPEP in 6 months for MGUS surveillance    Follow Up   6 months  All questions were answered to the patient's satisfaction during this encounter  They appreciated and thanked me for spending time with them  The patient knows the contact information for our office and know to reach out for any relevant concerns related to this encounter  For all other listed problems and medical diagnosis in his chart - they are managed by PCP and/or other specialists, which patient acknowledges        Juan F Valerio PA-C  Hematology & Medical Oncology

## 2021-07-13 LAB
ALBUMIN SERPL ELPH-MCNC: 4.72 G/DL (ref 3.5–5)
ALBUMIN SERPL ELPH-MCNC: 62.9 % (ref 52–65)
ALPHA1 GLOB SERPL ELPH-MCNC: 0.27 G/DL (ref 0.1–0.4)
ALPHA1 GLOB SERPL ELPH-MCNC: 3.6 % (ref 2.5–5)
ALPHA2 GLOB SERPL ELPH-MCNC: 0.68 G/DL (ref 0.4–1.2)
ALPHA2 GLOB SERPL ELPH-MCNC: 9.1 % (ref 7–13)
B2 MICROGLOB SERPL-MCNC: 1.4 MG/L (ref 0.6–2.4)
BETA GLOB ABNORMAL SERPL ELPH-MCNC: 0.41 G/DL (ref 0.4–0.8)
BETA1 GLOB SERPL ELPH-MCNC: 5.5 % (ref 5–13)
BETA2 GLOB SERPL ELPH-MCNC: 5.2 % (ref 2–8)
BETA2+GAMMA GLOB SERPL ELPH-MCNC: 0.39 G/DL (ref 0.2–0.5)
GAMMA GLOB ABNORMAL SERPL ELPH-MCNC: 1.03 G/DL (ref 0.5–1.6)
GAMMA GLOB SERPL ELPH-MCNC: 13.7 % (ref 12–22)
IGG/ALB SER: 1.7 {RATIO} (ref 1.1–1.8)
INTERPRETATION UR IFE-IMP: NORMAL
M PROTEIN 1 MFR SERPL ELPH: 1.7 %
M PROTEIN 1 SERPL ELPH-MCNC: 0.13 G/DL
PROT PATTERN SERPL ELPH-IMP: NORMAL
PROT SERPL-MCNC: 7.1 G/DL (ref 6.4–8.2)

## 2021-07-14 ENCOUNTER — OFFICE VISIT (OUTPATIENT)
Dept: HEMATOLOGY ONCOLOGY | Facility: CLINIC | Age: 71
End: 2021-07-14
Payer: COMMERCIAL

## 2021-07-14 ENCOUNTER — RA CDI HCC (OUTPATIENT)
Dept: OTHER | Facility: HOSPITAL | Age: 71
End: 2021-07-14

## 2021-07-14 VITALS
WEIGHT: 103 LBS | RESPIRATION RATE: 16 BRPM | OXYGEN SATURATION: 100 % | BODY MASS INDEX: 20.22 KG/M2 | TEMPERATURE: 98 F | HEART RATE: 62 BPM | DIASTOLIC BLOOD PRESSURE: 44 MMHG | SYSTOLIC BLOOD PRESSURE: 102 MMHG | HEIGHT: 60 IN

## 2021-07-14 DIAGNOSIS — D47.2 MGUS (MONOCLONAL GAMMOPATHY OF UNKNOWN SIGNIFICANCE): Primary | ICD-10-CM

## 2021-07-14 PROCEDURE — 3008F BODY MASS INDEX DOCD: CPT | Performed by: STUDENT IN AN ORGANIZED HEALTH CARE EDUCATION/TRAINING PROGRAM

## 2021-07-14 PROCEDURE — 1036F TOBACCO NON-USER: CPT | Performed by: STUDENT IN AN ORGANIZED HEALTH CARE EDUCATION/TRAINING PROGRAM

## 2021-07-14 PROCEDURE — 99214 OFFICE O/P EST MOD 30 MIN: CPT | Performed by: STUDENT IN AN ORGANIZED HEALTH CARE EDUCATION/TRAINING PROGRAM

## 2021-07-14 PROCEDURE — 1160F RVW MEDS BY RX/DR IN RCRD: CPT | Performed by: STUDENT IN AN ORGANIZED HEALTH CARE EDUCATION/TRAINING PROGRAM

## 2021-07-14 NOTE — PROGRESS NOTES
[facility]  coding [facility]          Chart reviewed, no opportunity found: CHART REVIEWED, NO OPPORTUNITY FOUND                     Patients insurance company:  [employer] [facility] (Medicare Advantage and Commercial)

## 2021-07-20 ENCOUNTER — TELEPHONE (OUTPATIENT)
Dept: HEMATOLOGY ONCOLOGY | Facility: CLINIC | Age: 71
End: 2021-07-20

## 2021-07-20 NOTE — TELEPHONE ENCOUNTER
Spoke to patient to let her know that her appt time with Mark Sousa PA-C on 1/12/22 has been moved to 10:30am

## 2021-07-22 ENCOUNTER — OFFICE VISIT (OUTPATIENT)
Dept: INTERNAL MEDICINE CLINIC | Facility: CLINIC | Age: 71
End: 2021-07-22
Payer: COMMERCIAL

## 2021-07-22 ENCOUNTER — APPOINTMENT (OUTPATIENT)
Dept: LAB | Facility: CLINIC | Age: 71
End: 2021-07-22
Payer: COMMERCIAL

## 2021-07-22 VITALS
BODY MASS INDEX: 20.14 KG/M2 | SYSTOLIC BLOOD PRESSURE: 110 MMHG | WEIGHT: 102.6 LBS | HEART RATE: 56 BPM | OXYGEN SATURATION: 97 % | TEMPERATURE: 97.5 F | HEIGHT: 60 IN | DIASTOLIC BLOOD PRESSURE: 68 MMHG

## 2021-07-22 DIAGNOSIS — E78.2 MIXED HYPERLIPIDEMIA: Primary | ICD-10-CM

## 2021-07-22 DIAGNOSIS — Z00.00 HEALTHCARE MAINTENANCE: ICD-10-CM

## 2021-07-22 DIAGNOSIS — E78.2 MIXED HYPERLIPIDEMIA: ICD-10-CM

## 2021-07-22 LAB
BILIRUB UR QL STRIP: NEGATIVE
CHOLEST SERPL-MCNC: 179 MG/DL (ref 50–200)
CLARITY UR: CLEAR
COLOR UR: YELLOW
GLUCOSE UR STRIP-MCNC: NEGATIVE MG/DL
HDLC SERPL-MCNC: 102 MG/DL
HGB UR QL STRIP.AUTO: NEGATIVE
KETONES UR STRIP-MCNC: NEGATIVE MG/DL
LDLC SERPL CALC-MCNC: 70 MG/DL (ref 0–100)
LEUKOCYTE ESTERASE UR QL STRIP: NEGATIVE
NITRITE UR QL STRIP: NEGATIVE
PH UR STRIP.AUTO: 7 [PH]
PROT UR STRIP-MCNC: NEGATIVE MG/DL
SP GR UR STRIP.AUTO: 1.01 (ref 1–1.03)
TRIGL SERPL-MCNC: 35 MG/DL
TSH SERPL DL<=0.05 MIU/L-ACNC: 1.62 UIU/ML (ref 0.36–3.74)
UROBILINOGEN UR QL STRIP.AUTO: 0.2 E.U./DL

## 2021-07-22 PROCEDURE — 1101F PT FALLS ASSESS-DOCD LE1/YR: CPT | Performed by: INTERNAL MEDICINE

## 2021-07-22 PROCEDURE — 84443 ASSAY THYROID STIM HORMONE: CPT

## 2021-07-22 PROCEDURE — 3288F FALL RISK ASSESSMENT DOCD: CPT | Performed by: INTERNAL MEDICINE

## 2021-07-22 PROCEDURE — 3725F SCREEN DEPRESSION PERFORMED: CPT | Performed by: INTERNAL MEDICINE

## 2021-07-22 PROCEDURE — 36415 COLL VENOUS BLD VENIPUNCTURE: CPT

## 2021-07-22 PROCEDURE — 1036F TOBACCO NON-USER: CPT | Performed by: INTERNAL MEDICINE

## 2021-07-22 PROCEDURE — 1160F RVW MEDS BY RX/DR IN RCRD: CPT | Performed by: INTERNAL MEDICINE

## 2021-07-22 PROCEDURE — 99397 PER PM REEVAL EST PAT 65+ YR: CPT | Performed by: INTERNAL MEDICINE

## 2021-07-22 PROCEDURE — 81003 URINALYSIS AUTO W/O SCOPE: CPT | Performed by: INTERNAL MEDICINE

## 2021-07-22 PROCEDURE — 3008F BODY MASS INDEX DOCD: CPT | Performed by: INTERNAL MEDICINE

## 2021-07-22 PROCEDURE — 80061 LIPID PANEL: CPT

## 2021-07-22 NOTE — PROGRESS NOTES
Assessment/Plan:       Diagnoses and all orders for this visit:    Mixed hyperlipidemia  -     Lipid Panel with Direct LDL reflex; Future  -     UA (URINE) with reflex to Scope  -     TSH, 3rd generation with Free T4 reflex; Future    Healthcare maintenance                Subjective:      Patient ID: Carlene Rick is a 78 y o  female  A 81-year-old female    Monoclonal gammopathy of uncertain significance followed by Hematology  Steatosis without clinical sequelae  Hyperlipidemia; treated with Lipitor  History of migraine headaches; not very active  The patient uses some tripped and rarely but manages to stay ahead of this by keeping herself hydrated    Colonoscopy up-to-date  Last time February 2017 was clean  Due to a prior history colon polyps should be done again in 2022    Mammogram up-to-date              The following portions of the patient's history were reviewed and updated as appropriate:   She has a past medical history of H/O nonmelanoma skin cancer  ,  does not have any pertinent problems on file  ,   has a past surgical history that includes Colonoscopy  ,  family history is not on file  ,   reports that she has never smoked  She has never used smokeless tobacco  She reports current alcohol use  She reports that she does not use drugs  ,  is allergic to dust mite extract and pollen extract     Current Outpatient Medications   Medication Sig Dispense Refill    aspirin 81 MG tablet Take by mouth      atorvastatin (LIPITOR) 20 mg tablet Take 1 tablet (20 mg total) by mouth daily 90 tablet 3    Calcium Carbonate-Vitamin D (CALCIUM 600+D) 600-200 MG-UNIT TABS Take by mouth      multivitamin (THERAGRAN) TABS Take by mouth      olopatadine HCl (PATADAY) 0 2 % opth drops INSTILL 1 DROP INTO AFFECTED EYES DAILY      SUMAtriptan (IMITREX) 100 mg tablet 1 tablet as needed for headache 10 tablet 5    vitamin B-12 (VITAMIN B-12) 500 mcg tablet Take 500 mcg by mouth daily       No current facility-administered medications for this visit  Review of Systems   Constitutional: Negative for chills and fever  HENT: Negative for sore throat and trouble swallowing  Eyes: Negative for pain  Respiratory: Negative for cough, shortness of breath and wheezing  Cardiovascular: Negative for chest pain and leg swelling  Gastrointestinal: Negative for abdominal pain, diarrhea, nausea and vomiting  Endocrine: Negative for cold intolerance and heat intolerance  Genitourinary: Negative for dysuria, frequency and pelvic pain  Musculoskeletal: Negative for arthralgias and joint swelling  Skin: Negative for rash and wound  Allergic/Immunologic: Negative for immunocompromised state  Neurological: Negative for dizziness, seizures, syncope and headaches  Psychiatric/Behavioral: Negative for dysphoric mood  The patient is not nervous/anxious  Objective:  Vitals:    07/22/21 1120   BP: 110/68   Pulse: 56   Temp: 97 5 °F (36 4 °C)   SpO2: 97%      Physical Exam  Constitutional:       Appearance: She is well-developed  HENT:      Head: Normocephalic and atraumatic  Eyes:      Pupils: Pupils are equal, round, and reactive to light  Neck:      Thyroid: No thyromegaly  Trachea: No tracheal deviation  Cardiovascular:      Rate and Rhythm: Normal rate and regular rhythm  Heart sounds: Normal heart sounds  No murmur heard  No gallop  Pulmonary:      Effort: No respiratory distress  Breath sounds: No wheezing or rales  Abdominal:      General: Bowel sounds are normal       Palpations: Abdomen is soft  Tenderness: There is no abdominal tenderness  Musculoskeletal:         General: No tenderness or deformity  Normal range of motion  Cervical back: Normal range of motion and neck supple  Skin:     General: Skin is warm  Neurological:      Mental Status: She is alert and oriented to person, place, and time        Coordination: Coordination normal  Psychiatric:         Judgment: Judgment normal            Patient Instructions    A well visit  Rather minimal all problems are controlled  Prevention up-to-date  No new problems      Revisit yearly or as needed

## 2021-09-02 ENCOUNTER — OFFICE VISIT (OUTPATIENT)
Dept: DERMATOLOGY | Facility: CLINIC | Age: 71
End: 2021-09-02
Payer: COMMERCIAL

## 2021-09-02 DIAGNOSIS — L57.0 ACTINIC KERATOSIS: ICD-10-CM

## 2021-09-02 DIAGNOSIS — Z85.828 HISTORY OF SKIN CANCER: ICD-10-CM

## 2021-09-02 DIAGNOSIS — Z13.89 SCREENING FOR SKIN CONDITION: ICD-10-CM

## 2021-09-02 DIAGNOSIS — L82.1 SEBORRHEIC KERATOSIS: Primary | ICD-10-CM

## 2021-09-02 PROCEDURE — 99213 OFFICE O/P EST LOW 20 MIN: CPT | Performed by: DERMATOLOGY

## 2021-09-02 PROCEDURE — 17003 DESTRUCT PREMALG LES 2-14: CPT | Performed by: DERMATOLOGY

## 2021-09-02 PROCEDURE — 17000 DESTRUCT PREMALG LESION: CPT | Performed by: DERMATOLOGY

## 2021-09-02 NOTE — PATIENT INSTRUCTIONS
Actinic Keratosis:  Patient advised lesions are precancers  These should resolve with cryosurgery if not to let us know sun block recommended  Seborrheic keratosis patient reassured these are normal growths we acquire with age no treatment needed  History of skin cancer in no recurrence nothing else atypical sunblock recommended follow-up in 1 year  Screening for dermatologic disorders nothing else of concern noted on complete exam follow-up in 1 year  Treatment with Cryotherapy    The doctor has treated your skin with nitrogen, which is 320 degrees Fahrenheit below zero  He has given the treated area "frostbite "    Stinging should subside within a few hours  You can take Tylenol for pain, if needed  Over the next few days, it is normal if the area becomes reddened, a blood blister, or swollen with fluid  If the lesion treated was near the eye - you could get a swollen eye over the next few days  Do not panic! This is all temporary, and will resolve with time  There is no special treatment - just keep the area clean  Makeup and BandAids can be used, if preferred  When the area starts to dry up and peel off, using Vaseline can help healing  It usually takes up to a month for it to heal   Some lesions are recurrent and may require repeat treatments  If a lesion has not healed in one month, please don't hesitate to contact us  If you have any further questions that are not answered here, please call us  [phone]    Thank you for allowing us to care for you

## 2021-09-02 NOTE — PROGRESS NOTES
[facility] DERMATOLOGY  [address] [place] [address] [address] [address]  [phone]  [phone]     MRN: [id number] : 1950  Encounter: [id number]  Patient Information: Jamel Runner  Chief complaint:  Yearly skin check    History of present illness:  28-year-old female presents for overall skin check previous history of nonmelanoma skin cancer concerned regarding lesion on her left lower lip no other concerns noted  Past Medical History:   Diagnosis Date    H/O nonmelanoma skin cancer     last assessed 17     Past Surgical History:   Procedure Laterality Date    COLONOSCOPY      last assessed 16, next in 2016     Social History   Social History     Substance and Sexual Activity   Alcohol Use Yes    Comment: minimum     Social History     Substance and Sexual Activity   Drug Use No     Social History     Tobacco Use   Smoking Status Never Smoker   Smokeless Tobacco Never Used     History reviewed  No pertinent family history  Meds/Allergies   Allergies   Allergen Reactions    Dust Mite Extract     Pollen Extract        Meds:  Prior to Admission medications    Medication Sig Start Date End Date Taking?  Authorizing Provider   aspirin 81 MG tablet Take by mouth   Yes Historical Provider, MD   atorvastatin (LIPITOR) 20 mg tablet Take 1 tablet (20 mg total) by mouth daily 20  Yes Cy Thompson MD   Calcium Carbonate-Vitamin D (CALCIUM 600+D) 600-200 MG-UNIT TABS Take by mouth   Yes Historical Provider, MD   multivitamin (THERAGRAN) TABS Take by mouth   Yes Historical Provider, MD   olopatadine HCl (PATADAY) 0 2 % opth drops INSTILL 1 DROP INTO AFFECTED EYES DAILY 17  Yes Historical Provider, MD   SUMAtriptan (IMITREX) 100 mg tablet 1 tablet as needed for headache 20  Yes Cy Thompson MD   vitamin B-12 (VITAMIN B-12) 500 mcg tablet Take 500 mcg by mouth daily  Patient not taking: Reported on 2021    Historical Provider, MD Subjective:     Review of Systems:    General: negative for - chills, fatigue, fever,  weight gain or weight loss  Psychological: negative for - anxiety, behavioral disorder, concentration difficulties, decreased libido, depression, irritability, memory difficulties, mood swings, sleep disturbances or suicidal ideation  ENT: negative for - hearing difficulties , nasal congestion, nasal discharge, oral lesions, sinus pain, sneezing, sore throat  Allergy and Immunology: negative for - hives, insect bite sensitivity,  Hematological and Lymphatic: negative for - bleeding problems, blood clots,bruising, swollen lymph nodes  Endocrine: negative for - hair pattern changes, hot flashes, malaise/lethargy, mood swings, palpitations, polydipsia/polyuria, skin changes, temperature intolerance or unexpected weight change  Respiratory: negative for - cough, hemoptysis, orthopnea, shortness of breath, or wheezing  Cardiovascular: negative for - chest pain, dyspnea on exertion, edema,  Gastrointestinal: negative for - abdominal pain, nausea/vomiting  Genito-Urinary: negative for - dysuria, incontinence, irregular/heavy menses or urinary frequency/urgency  Musculoskeletal: negative for - gait disturbance, joint pain, joint stiffness, joint swelling, muscle pain, muscular weakness  Dermatological:  As in HPI  Neurological: negative for confusion, dizziness, headaches, impaired coordination/balance, memory loss, numbness/tingling, seizures, speech problems, tremors or weakness       Objective: There were no vitals taken for this visit      Physical Exam:    General Appearance:    Alert, cooperative, no distress   Head:    Normocephalic, without obvious abnormality, atraumatic           Skin:   A full skin exam was performed including scalp, head scalp, eyes, ears, nose, lips, neck, chest, axilla, abdomen, back, buttocks, bilateral upper extremities, bilateral lower extremities, hands, feet, fingers, toes, fingernails, and toenails scaling erythematous areas noted below normal keratotic papules greasy stuck appearance previous sites skin cancer well healed without recurrence  Physical Exam  Constitutional:        HENT:      Head:          Cryotherapy Procedure Note    Pre-operative Diagnosis: actinic keratosis    Plan:  1  Instructed to keep the area dry and clean thereafter  Apply petrolatum if area gets crusty  2  Recommended that the patient use acetaminophen  as needed for pain  Locations:  Dorsum of the hand and left lower lip    Indications: Destruction of actinic keratosis x2    Patient informed of risks (permanent scarring, infection, light or dark discoloration, bleeding, infection, weakness, numbness and recurrence of the lesion) and benefits of the procedure and verbal informed consent obtained  The areas are treated with liquid nitrogen therapy, frozen until ice ball extended 2 mm beyond lesion, allowed to thaw, and treated again  The patient tolerated procedure well  The patient was instructed on post-op care, warned that there may be blister formation, redness and pain  Recommend OTC analgesia as needed for pain  Condition:  Stable    Complications:  none  Assessment:     1  Seborrheic keratosis     2  History of skin cancer     3  Screening for skin condition     4  Actinic keratosis           Plan:   Actinic Keratosis:  Patient advised lesions are precancers  These should resolve with cryosurgery if not to let us know sun block recommended    Seborrheic keratosis patient reassured these are normal growths we acquire with age no treatment needed  History of skin cancer in no recurrence nothing else atypical sunblock recommended follow-up in 1 year  Screening for dermatologic disorders nothing else of concern noted on complete exam follow-up in 1 year      Candis Velasco MD  9/2/2021,4:27 PM    Portions of the record may have been created with voice recognition software   Occasional wrong word or "sound a like" substitutions may have occurred due to the inherent limitations of voice recognition software   Read the chart carefully and recognize, using context, where substitutions have occurred

## 2021-12-01 ENCOUNTER — OFFICE VISIT (OUTPATIENT)
Dept: INTERNAL MEDICINE CLINIC | Facility: CLINIC | Age: 71
End: 2021-12-01
Payer: COMMERCIAL

## 2021-12-01 ENCOUNTER — APPOINTMENT (OUTPATIENT)
Dept: LAB | Facility: CLINIC | Age: 71
End: 2021-12-01
Payer: COMMERCIAL

## 2021-12-01 VITALS
BODY MASS INDEX: 20.46 KG/M2 | HEART RATE: 66 BPM | DIASTOLIC BLOOD PRESSURE: 66 MMHG | HEIGHT: 60 IN | OXYGEN SATURATION: 96 % | TEMPERATURE: 97.6 F | RESPIRATION RATE: 17 BRPM | SYSTOLIC BLOOD PRESSURE: 100 MMHG | WEIGHT: 104.2 LBS

## 2021-12-01 DIAGNOSIS — R53.83 FATIGUE, UNSPECIFIED TYPE: ICD-10-CM

## 2021-12-01 DIAGNOSIS — L65.9 THINNING HAIR: ICD-10-CM

## 2021-12-01 DIAGNOSIS — F41.9 ANXIETY: ICD-10-CM

## 2021-12-01 DIAGNOSIS — F51.01 PRIMARY INSOMNIA: Primary | ICD-10-CM

## 2021-12-01 LAB
ANION GAP SERPL CALCULATED.3IONS-SCNC: 3 MMOL/L (ref 4–13)
BASOPHILS # BLD AUTO: 0.04 THOUSANDS/ΜL (ref 0–0.1)
BASOPHILS NFR BLD AUTO: 1 % (ref 0–1)
BUN SERPL-MCNC: 13 MG/DL (ref 5–25)
CALCIUM SERPL-MCNC: 9.9 MG/DL (ref 8.3–10.1)
CHLORIDE SERPL-SCNC: 104 MMOL/L (ref 100–108)
CO2 SERPL-SCNC: 30 MMOL/L (ref 21–32)
CREAT SERPL-MCNC: 0.86 MG/DL (ref 0.6–1.3)
EOSINOPHIL # BLD AUTO: 0.07 THOUSAND/ΜL (ref 0–0.61)
EOSINOPHIL NFR BLD AUTO: 1 % (ref 0–6)
ERYTHROCYTE [DISTWIDTH] IN BLOOD BY AUTOMATED COUNT: 11.5 % (ref 11.6–15.1)
GFR SERPL CREATININE-BSD FRML MDRD: 69 ML/MIN/1.73SQ M
GLUCOSE SERPL-MCNC: 105 MG/DL (ref 65–140)
HCT VFR BLD AUTO: 42.9 % (ref 34.8–46.1)
HGB BLD-MCNC: 14.1 G/DL (ref 11.5–15.4)
IMM GRANULOCYTES # BLD AUTO: 0.01 THOUSAND/UL (ref 0–0.2)
IMM GRANULOCYTES NFR BLD AUTO: 0 % (ref 0–2)
LYMPHOCYTES # BLD AUTO: 2.27 THOUSANDS/ΜL (ref 0.6–4.47)
LYMPHOCYTES NFR BLD AUTO: 36 % (ref 14–44)
MCH RBC QN AUTO: 32 PG (ref 26.8–34.3)
MCHC RBC AUTO-ENTMCNC: 32.9 G/DL (ref 31.4–37.4)
MCV RBC AUTO: 97 FL (ref 82–98)
MONOCYTES # BLD AUTO: 0.46 THOUSAND/ΜL (ref 0.17–1.22)
MONOCYTES NFR BLD AUTO: 7 % (ref 4–12)
NEUTROPHILS # BLD AUTO: 3.38 THOUSANDS/ΜL (ref 1.85–7.62)
NEUTS SEG NFR BLD AUTO: 55 % (ref 43–75)
NRBC BLD AUTO-RTO: 0 /100 WBCS
PLATELET # BLD AUTO: 299 THOUSANDS/UL (ref 149–390)
PMV BLD AUTO: 10.4 FL (ref 8.9–12.7)
POTASSIUM SERPL-SCNC: 4 MMOL/L (ref 3.5–5.3)
RBC # BLD AUTO: 4.41 MILLION/UL (ref 3.81–5.12)
SODIUM SERPL-SCNC: 137 MMOL/L (ref 136–145)
TSH SERPL DL<=0.05 MIU/L-ACNC: 1.37 UIU/ML (ref 0.36–3.74)
WBC # BLD AUTO: 6.23 THOUSAND/UL (ref 4.31–10.16)

## 2021-12-01 PROCEDURE — 84443 ASSAY THYROID STIM HORMONE: CPT

## 2021-12-01 PROCEDURE — 85025 COMPLETE CBC W/AUTO DIFF WBC: CPT

## 2021-12-01 PROCEDURE — 99214 OFFICE O/P EST MOD 30 MIN: CPT | Performed by: NURSE PRACTITIONER

## 2021-12-01 PROCEDURE — 36415 COLL VENOUS BLD VENIPUNCTURE: CPT

## 2021-12-01 PROCEDURE — 80048 BASIC METABOLIC PNL TOTAL CA: CPT

## 2021-12-01 PROCEDURE — 82306 VITAMIN D 25 HYDROXY: CPT

## 2021-12-01 RX ORDER — TRAZODONE HYDROCHLORIDE 50 MG/1
50 TABLET ORAL
Qty: 30 TABLET | Refills: 0 | Status: SHIPPED | OUTPATIENT
Start: 2021-12-01 | End: 2021-12-20 | Stop reason: SDUPTHER

## 2021-12-01 RX ORDER — PAROXETINE 10 MG/1
10 TABLET, FILM COATED ORAL DAILY
Qty: 30 TABLET | Refills: 0 | Status: SHIPPED | OUTPATIENT
Start: 2021-12-01 | End: 2021-12-20 | Stop reason: SDUPTHER

## 2021-12-02 ENCOUNTER — TELEPHONE (OUTPATIENT)
Dept: ADMINISTRATIVE | Facility: OTHER | Age: 71
End: 2021-12-02

## 2021-12-02 PROBLEM — F51.01 PRIMARY INSOMNIA: Status: ACTIVE | Noted: 2021-12-02

## 2021-12-02 LAB — 25(OH)D3 SERPL-MCNC: 41.3 NG/ML (ref 30–100)

## 2021-12-20 ENCOUNTER — TELEPHONE (OUTPATIENT)
Dept: INTERNAL MEDICINE CLINIC | Facility: CLINIC | Age: 71
End: 2021-12-20

## 2021-12-20 ENCOUNTER — OFFICE VISIT (OUTPATIENT)
Dept: INTERNAL MEDICINE CLINIC | Facility: CLINIC | Age: 71
End: 2021-12-20
Payer: COMMERCIAL

## 2021-12-20 VITALS
HEIGHT: 60 IN | BODY MASS INDEX: 20.81 KG/M2 | DIASTOLIC BLOOD PRESSURE: 70 MMHG | RESPIRATION RATE: 17 BRPM | WEIGHT: 106 LBS | OXYGEN SATURATION: 99 % | TEMPERATURE: 97.3 F | SYSTOLIC BLOOD PRESSURE: 108 MMHG | HEART RATE: 58 BPM

## 2021-12-20 DIAGNOSIS — F51.01 PRIMARY INSOMNIA: ICD-10-CM

## 2021-12-20 DIAGNOSIS — F41.9 ANXIETY: ICD-10-CM

## 2021-12-20 PROCEDURE — 99213 OFFICE O/P EST LOW 20 MIN: CPT | Performed by: NURSE PRACTITIONER

## 2021-12-20 PROCEDURE — 3008F BODY MASS INDEX DOCD: CPT | Performed by: NURSE PRACTITIONER

## 2021-12-20 PROCEDURE — 1160F RVW MEDS BY RX/DR IN RCRD: CPT | Performed by: NURSE PRACTITIONER

## 2021-12-20 PROCEDURE — 1036F TOBACCO NON-USER: CPT | Performed by: NURSE PRACTITIONER

## 2021-12-20 RX ORDER — TRAZODONE HYDROCHLORIDE 50 MG/1
50 TABLET ORAL
Qty: 90 TABLET | Refills: 0 | Status: SHIPPED | OUTPATIENT
Start: 2021-12-20 | End: 2022-03-17

## 2021-12-20 RX ORDER — PAROXETINE 10 MG/1
10 TABLET, FILM COATED ORAL DAILY
Qty: 90 TABLET | Refills: 0 | Status: SHIPPED | OUTPATIENT
Start: 2021-12-20 | End: 2022-03-17

## 2022-01-10 ENCOUNTER — TELEPHONE (OUTPATIENT)
Dept: HEMATOLOGY ONCOLOGY | Facility: CLINIC | Age: 72
End: 2022-01-10

## 2022-01-10 NOTE — TELEPHONE ENCOUNTER
Spoke with patient and rescheduled her appt to 2/1 at 8am in the [facility] office with Megan Escoto, patient voiced understanding  Patient was exposed to covid no labs were completed

## 2022-01-20 ENCOUNTER — OFFICE VISIT (OUTPATIENT)
Dept: DERMATOLOGY | Facility: CLINIC | Age: 72
End: 2022-01-20
Payer: COMMERCIAL

## 2022-01-20 VITALS — BODY MASS INDEX: 20.47 KG/M2 | TEMPERATURE: 97.3 F | WEIGHT: 104.8 LBS

## 2022-01-20 DIAGNOSIS — L82.0 INFLAMED SEBORRHEIC KERATOSIS: Primary | ICD-10-CM

## 2022-01-20 DIAGNOSIS — E78.2 MIXED HYPERLIPIDEMIA: ICD-10-CM

## 2022-01-20 PROCEDURE — 1160F RVW MEDS BY RX/DR IN RCRD: CPT | Performed by: DERMATOLOGY

## 2022-01-20 PROCEDURE — 1036F TOBACCO NON-USER: CPT | Performed by: DERMATOLOGY

## 2022-01-20 PROCEDURE — 99212 OFFICE O/P EST SF 10 MIN: CPT | Performed by: DERMATOLOGY

## 2022-01-20 RX ORDER — ATORVASTATIN CALCIUM 20 MG/1
20 TABLET, FILM COATED ORAL DAILY
Qty: 90 TABLET | Refills: 0 | Status: SHIPPED | OUTPATIENT
Start: 2022-01-20 | End: 2022-04-19

## 2022-01-20 NOTE — PROGRESS NOTES
[address]  [address] [facility] [address]  [phone]  [phone]     MRN: [id number] : 1950  Encounter: [id number]  Patient Information: Shanda Abraham    Subjective:     73year old female presents for concerns regarding lesion on the right clavicle this sudden appear for according to her no other concerns noted patient due for overall checkup in the some     Objective:   Temp (!) 97 3 °F (36 3 °C) (Temporal)   Wt 47 5 kg (104 lb 12 8 oz)   BMI 20 47 kg/m²     Physical Exam:    General Appearance:    Alert, cooperative, no distress   Skin:   Irritated slightly crusted papule noted on the right that is falling off which basically with general removal     Assessment:     1  Inflamed seborrheic keratosis           Plan:   Appear to be a a inflamed keratosis no further treatment indicated      Prior to Admission medications    Medication Sig Start Date End Date Taking?  Authorizing Provider   aspirin 81 MG tablet Take by mouth   Yes Historical Provider, MD   atorvastatin (LIPITOR) 20 mg tablet Take 1 tablet (20 mg total) by mouth daily 22  Yes Dion Zhu MD   BIOTIN PO Take by mouth   Yes Historical Provider, MD   Calcium Carbonate-Vitamin D (CALCIUM 600+D) 600-200 MG-UNIT TABS Take by mouth   Yes Historical Provider, MD   multivitamin (THERAGRAN) TABS Take by mouth   Yes Historical Provider, MD   olopatadine HCl (PATADAY) 0 2 % opth drops Currently not using 21  Yes Historical Provider, MD   PARoxetine (PAXIL) 10 mg tablet Take 1 tablet (10 mg total) by mouth daily 21  Yes KIRSTY Morrow   traZODone (DESYREL) 50 mg tablet Take 1 tablet (50 mg total) by mouth daily at bedtime 21  Yes KIRSTY Morrow   SUMAtriptan (IMITREX) 100 mg tablet 1 tablet as needed for headache  Patient not taking: Reported on 2022   Dion Zhu MD   atorvastatin (LIPITOR) 20 mg tablet Take 1 tablet (20 mg total) by mouth daily 12/11/20 1/20/22  Archana Azevedo MD     Allergies   Allergen Reactions    Dust Mite Extract     Pollen Extract        Scotty Colbert MD  1/20/2022,3:24 PM    Portions of the record may have been created with voice recognition software   Occasional wrong word or "sound a like" substitutions may have occurred due to the inherent limitations of voice recognition software   Read the chart carefully and recognize, using context, where substitutions have occurred

## 2022-01-28 ENCOUNTER — TELEPHONE (OUTPATIENT)
Dept: HEMATOLOGY ONCOLOGY | Facility: CLINIC | Age: 72
End: 2022-01-28

## 2022-01-28 NOTE — TELEPHONE ENCOUNTER
Spoke with patient, she is unable to have labs done prior to appt due to the weather  I have r s her 3/8/22 at 8:30 am in the [address] [place] [address] office, advised her to have labs completed 1 week prior to appt  Patient voiced understanding

## 2022-02-07 ENCOUNTER — HOSPITAL ENCOUNTER (OUTPATIENT)
Dept: MRI IMAGING | Facility: CLINIC | Age: 72
Discharge: HOME | End: 2022-02-07
Payer: COMMERCIAL

## 2022-02-07 DIAGNOSIS — G43.909 SICK HEADACHE: ICD-10-CM

## 2022-02-07 PROCEDURE — 70551 MRI BRAIN STEM W/O DYE: CPT

## 2022-02-22 ENCOUNTER — APPOINTMENT (OUTPATIENT)
Dept: LAB | Facility: CLINIC | Age: 72
End: 2022-02-22
Payer: COMMERCIAL

## 2022-02-22 DIAGNOSIS — D47.2 MGUS (MONOCLONAL GAMMOPATHY OF UNKNOWN SIGNIFICANCE): ICD-10-CM

## 2022-02-22 LAB
ALBUMIN SERPL BCP-MCNC: 3.7 G/DL (ref 3.5–5)
ALP SERPL-CCNC: 59 U/L (ref 46–116)
ALT SERPL W P-5'-P-CCNC: 129 U/L (ref 12–78)
ANION GAP SERPL CALCULATED.3IONS-SCNC: 4 MMOL/L (ref 4–13)
AST SERPL W P-5'-P-CCNC: 107 U/L (ref 5–45)
BILIRUB SERPL-MCNC: 0.44 MG/DL (ref 0.2–1)
BUN SERPL-MCNC: 19 MG/DL (ref 5–25)
CALCIUM SERPL-MCNC: 9.7 MG/DL (ref 8.3–10.1)
CHLORIDE SERPL-SCNC: 102 MMOL/L (ref 100–108)
CO2 SERPL-SCNC: 31 MMOL/L (ref 21–32)
CREAT SERPL-MCNC: 0.8 MG/DL (ref 0.6–1.3)
ERYTHROCYTE [DISTWIDTH] IN BLOOD BY AUTOMATED COUNT: 11.7 % (ref 11.6–15.1)
GFR SERPL CREATININE-BSD FRML MDRD: 75 ML/MIN/1.73SQ M
GLUCOSE SERPL-MCNC: 91 MG/DL (ref 65–140)
HCT VFR BLD AUTO: 37.2 % (ref 34.8–46.1)
HGB BLD-MCNC: 12.8 G/DL (ref 11.5–15.4)
MCH RBC QN AUTO: 32.2 PG (ref 26.8–34.3)
MCHC RBC AUTO-ENTMCNC: 34.4 G/DL (ref 31.4–37.4)
MCV RBC AUTO: 94 FL (ref 82–98)
PLATELET # BLD AUTO: 241 THOUSANDS/UL (ref 149–390)
PMV BLD AUTO: 10.8 FL (ref 8.9–12.7)
POTASSIUM SERPL-SCNC: 3.8 MMOL/L (ref 3.5–5.3)
PROT SERPL-MCNC: 7.2 G/DL (ref 6.4–8.2)
RBC # BLD AUTO: 3.97 MILLION/UL (ref 3.81–5.12)
SODIUM SERPL-SCNC: 137 MMOL/L (ref 136–145)
WBC # BLD AUTO: 5.5 THOUSAND/UL (ref 4.31–10.16)

## 2022-02-22 PROCEDURE — 80053 COMPREHEN METABOLIC PANEL: CPT

## 2022-02-22 PROCEDURE — 82784 ASSAY IGA/IGD/IGG/IGM EACH: CPT

## 2022-02-22 PROCEDURE — 83521 IG LIGHT CHAINS FREE EACH: CPT

## 2022-02-22 PROCEDURE — 85027 COMPLETE CBC AUTOMATED: CPT

## 2022-02-22 PROCEDURE — 84165 PROTEIN E-PHORESIS SERUM: CPT

## 2022-02-22 PROCEDURE — 84165 PROTEIN E-PHORESIS SERUM: CPT | Performed by: PATHOLOGY

## 2022-02-22 PROCEDURE — 36415 COLL VENOUS BLD VENIPUNCTURE: CPT

## 2022-02-23 LAB
ALBUMIN SERPL ELPH-MCNC: 4.51 G/DL (ref 3.5–5)
ALBUMIN SERPL ELPH-MCNC: 61.8 % (ref 52–65)
ALPHA1 GLOB SERPL ELPH-MCNC: 0.28 G/DL (ref 0.1–0.4)
ALPHA1 GLOB SERPL ELPH-MCNC: 3.8 % (ref 2.5–5)
ALPHA2 GLOB SERPL ELPH-MCNC: 0.64 G/DL (ref 0.4–1.2)
ALPHA2 GLOB SERPL ELPH-MCNC: 8.7 % (ref 7–13)
BETA GLOB ABNORMAL SERPL ELPH-MCNC: 0.41 G/DL (ref 0.4–0.8)
BETA1 GLOB SERPL ELPH-MCNC: 5.6 % (ref 5–13)
BETA2 GLOB SERPL ELPH-MCNC: 5.2 % (ref 2–8)
BETA2+GAMMA GLOB SERPL ELPH-MCNC: 0.38 G/DL (ref 0.2–0.5)
GAMMA GLOB ABNORMAL SERPL ELPH-MCNC: 1.09 G/DL (ref 0.5–1.6)
GAMMA GLOB SERPL ELPH-MCNC: 14.9 % (ref 12–22)
IGA SERPL-MCNC: 194 MG/DL (ref 70–400)
IGG SERPL-MCNC: 1040 MG/DL (ref 700–1600)
IGG/ALB SER: 1.62 {RATIO} (ref 1.1–1.8)
IGM SERPL-MCNC: 82 MG/DL (ref 40–230)
M PROTEIN 1 MFR SERPL ELPH: 1.6 %
M PROTEIN 1 SERPL ELPH-MCNC: 0.12 G/DL
PROT PATTERN SERPL ELPH-IMP: NORMAL
PROT SERPL-MCNC: 7.3 G/DL (ref 6.4–8.2)

## 2022-02-24 LAB
KAPPA LC FREE SER-MCNC: 14.7 MG/L (ref 3.3–19.4)
KAPPA LC FREE/LAMBDA FREE SER: 1.32 {RATIO} (ref 0.26–1.65)
LAMBDA LC FREE SERPL-MCNC: 11.1 MG/L (ref 5.7–26.3)

## 2022-03-04 NOTE — PROGRESS NOTES
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)       SUBJECTIVE      Jax Mckoy is a 73 y o  here for follow-up with me today for IgG Kappa MGUS  The patient was previously seen by Dr Ashish Prescott and Dr Nicolas Wolf and transferred to my care      Per chart review, she was diagnosed with MGUS in July 2017 and has had a small M-spike of 0 13, negative skeletal survey, no anemia, normal renal function, serum FLC WNL, and normal quantitative immunoglobulins  She has been followed at a 1 year interval         This Visit      Her M-spike has been stable:   6/4/2019  6/15/2020  7/8/2021  2/22/2022    M PEAK 1 CONC 0 13 0 24 0 13 0 12      Her SPEP showed IgG kappa MGUS:   2/22/2022    SPEP INTERPRETATION The SPEP shows a monoclonal peak in the gamma region  Previous immunofixation 07/08/2021 identified a moclonal gammopathy as IgG kappa       She has normal serum FLC ratio:   6/4/2019  6/15/2020  7/8/2021  2/22/2022    IMMUNOGLOBULIN KAPPA FREE LIGHT CHAIN 12 8 13 3 13 8 14 7   IMMUNOGLOBULIN LAMBDA FREE LIGHT CHAIN 9 1 9 8 9 5 11 1   KAPPA/LAMBDA FREE LIGHT CHAIN RATIO 1 41 1 36 1 45 1 32      She has normal quantitative immunoglobulins:   6/15/2020  7/8/2021  2/22/2022     0 199 0 194 0   IGG 1,090 0 1,060 0 1,040 0   IGM 91 0 94 0 82 0     Her CBC is unremarkable:   6/4/2019  6/15/2020  7/8/2021  12/1/2021  2/22/2022    WBC 6 20 5 04 5 36 6 23 5 50   Hemoglobin 14 0 13 7 14 3 14 1 12 8   HCT 41 5 41 2 43 2 42 9 37 2   MCV 95 97 97 97 94   Platelet Count 903 050 284 299 241       She is here by herself today  She voices no acute complaints  She notes mild fatigue which is her baseline  She stays active with frequent walking  She notes improvement of mild dizziness when she gets up in the morning which she attributes to her low blood pressure  She denies fevers, chills, unintentional weight loss, night sweats, bone pain, headaches, lightheadedness, cough, SOB, palpitations, chest pain, abdominal tenderness/distension, nausea/vomiting, constipation/diarrhea, melena/hematochezia, hematuria, petechiae/purpura, increased ecchymosis, or LE swelling  I have reviewed the relevant past medical, surgical, social and family history  I have also reviewed allergies and medications for this patient  Review of Systems  Review of Systems   All other systems reviewed and are negative  OBJECTIVE     Physical Exam  Vitals:    03/08/22 0841   BP: 102/58   BP Location: Left arm   Patient Position: Sitting   Cuff Size: Adult   Pulse: 60   Resp: 16   Temp: 97 9 °F (36 6 °C)   SpO2: 98%   Weight: 44 9 kg (99 lb)   Height: 5' (1 524 m)       Physical Exam  Vitals reviewed  Constitutional:       General: She is not in acute distress  Appearance: Normal appearance  She is normal weight  She is not ill-appearing, toxic-appearing or diaphoretic  Comments: Pleasant 73 y o  female sitting comfortably on an exam room chair  HENT:      Head: Normocephalic and atraumatic  Eyes:      General: No scleral icterus  Extraocular Movements: Extraocular movements intact  Conjunctiva/sclera: Conjunctivae normal       Pupils: Pupils are equal, round, and reactive to light  Comments: No conjunctival pallor  Cardiovascular:      Rate and Rhythm: Normal rate and regular rhythm  Pulses: Normal pulses  Heart sounds: Normal heart sounds  No murmur heard  No friction rub  No gallop      Pulmonary:      Effort: Pulmonary effort is normal  No respiratory distress  Breath sounds: Normal breath sounds  No wheezing or rales  Abdominal:      General: Bowel sounds are normal  There is no distension  Palpations: Abdomen is soft  There is no mass  Tenderness: There is no abdominal tenderness  There is no guarding or rebound  Musculoskeletal:         General: No swelling or tenderness  Normal range of motion  Cervical back: Normal range of motion and neck supple  No rigidity  Right lower leg: No edema  Left lower leg: No edema  Lymphadenopathy:      Cervical: No cervical adenopathy  Skin:     General: Skin is warm and dry  Coloration: Skin is not jaundiced or pale  Findings: No bruising, erythema or rash  Neurological:      General: No focal deficit present  Mental Status: She is alert  Mental status is at baseline  Psychiatric:         Mood and Affect: Mood normal          Behavior: Behavior normal           Imaging  Relevant imaging reviewed in chart    Labs  Relevant labs reviewed in chart      7/8/2021    Creatinine 0 69   Calcium 9 8   Total Protein 7 3   Albumin 3 9       ASSESSMENT & PLAN      Diagnosis ICD-10-CM Associated Orders   1  MGUS (monoclonal gammopathy of unknown significance)  D47 2 Comprehensive metabolic panel     CBC and Platelet     IgG, IgA, IgM     Protein electrophoresis, serum     Kappa/Lambda Light Chains Free With Ratio, Serum       I discussed Alex Helm Fridanikki's case with Dr Ammy Solo and we formulated the plan together  73 y o  female seen in follow-up for IgG Kappa MGUS  Discussion  · I extensively reviewed her blood work with her  I also reviewed the pathophysiology, diagnosis, management, and prognosis of MGUS  I explained that 1% of patients with MGUS may experience disease transformation to Multiple Myeloma, which is the reasoning for regular surveillance with blood work     · Given that she has had a longstanding history of IgG Kappa, very mild M-spike (0 12 g/dL) normal renal function, absence of anemia, normal quantitative immunoglobulins, and normal serum FLC ratio, her disease is consistent with low-risk MGUS  Thus, bone marrow biopsy is not indicated at this time for further investigation and we recommend continued surveillance of her MGUS  I answered all her questions and she voiced understanding  · Given her elevated LFTs, I recommended that she follow-up with PCP Dr Minor Mcpherson for consideration of referral to GI       Plan/Labs  · CMP, CBC, Quantitative immunoglobulins, serum FLC, and SPEP in 1 year for MGUS surveillance  · Follow-up with PCP for elevated LFTs     Follow Up  · 1 year    All questions were answered to the patient's satisfaction during this encounter  They appreciated and thanked me for spending time with them  The patient knows the contact information for our office and know to reach out for any relevant concerns related to this encounter  For all other listed problems and medical diagnosis in his chart - they are managed by PCP and/or other specialists, which patient acknowledges        Smith Fonseca PA-C  Hematology & Medical Oncology

## 2022-03-08 ENCOUNTER — OFFICE VISIT (OUTPATIENT)
Dept: HEMATOLOGY ONCOLOGY | Facility: CLINIC | Age: 72
End: 2022-03-08
Payer: COMMERCIAL

## 2022-03-08 VITALS
DIASTOLIC BLOOD PRESSURE: 58 MMHG | OXYGEN SATURATION: 98 % | BODY MASS INDEX: 19.44 KG/M2 | SYSTOLIC BLOOD PRESSURE: 102 MMHG | HEART RATE: 60 BPM | TEMPERATURE: 97.9 F | RESPIRATION RATE: 16 BRPM | WEIGHT: 99 LBS | HEIGHT: 60 IN

## 2022-03-08 DIAGNOSIS — D47.2 MGUS (MONOCLONAL GAMMOPATHY OF UNKNOWN SIGNIFICANCE): Primary | ICD-10-CM

## 2022-03-08 PROCEDURE — 3008F BODY MASS INDEX DOCD: CPT | Performed by: STUDENT IN AN ORGANIZED HEALTH CARE EDUCATION/TRAINING PROGRAM

## 2022-03-08 PROCEDURE — 99214 OFFICE O/P EST MOD 30 MIN: CPT | Performed by: STUDENT IN AN ORGANIZED HEALTH CARE EDUCATION/TRAINING PROGRAM

## 2022-03-08 PROCEDURE — 1036F TOBACCO NON-USER: CPT | Performed by: STUDENT IN AN ORGANIZED HEALTH CARE EDUCATION/TRAINING PROGRAM

## 2022-03-08 PROCEDURE — 1160F RVW MEDS BY RX/DR IN RCRD: CPT | Performed by: STUDENT IN AN ORGANIZED HEALTH CARE EDUCATION/TRAINING PROGRAM

## 2022-03-17 DIAGNOSIS — F41.9 ANXIETY: ICD-10-CM

## 2022-03-17 DIAGNOSIS — F51.01 PRIMARY INSOMNIA: ICD-10-CM

## 2022-03-17 RX ORDER — TRAZODONE HYDROCHLORIDE 50 MG/1
TABLET ORAL
Qty: 90 TABLET | Refills: 0 | Status: SHIPPED | OUTPATIENT
Start: 2022-03-17 | End: 2022-06-28

## 2022-03-17 RX ORDER — PAROXETINE 10 MG/1
TABLET, FILM COATED ORAL
Qty: 90 TABLET | Refills: 0 | Status: SHIPPED | OUTPATIENT
Start: 2022-03-17 | End: 2022-06-24

## 2022-03-31 ENCOUNTER — TELEPHONE (OUTPATIENT)
Dept: HEMATOLOGY ONCOLOGY | Facility: HOSPITAL | Age: 72
End: 2022-03-31

## 2022-03-31 NOTE — TELEPHONE ENCOUNTER
Called patient and spoke with patient regarding provider not being here and rescheduled patient with new provider  Patient confirmed appt, date and time and provider

## 2022-04-19 ENCOUNTER — TELEPHONE (OUTPATIENT)
Dept: INTERNAL MEDICINE CLINIC | Facility: CLINIC | Age: 72
End: 2022-04-19

## 2022-04-19 DIAGNOSIS — E78.2 MIXED HYPERLIPIDEMIA: ICD-10-CM

## 2022-04-19 RX ORDER — ATORVASTATIN CALCIUM 20 MG/1
TABLET, FILM COATED ORAL
Qty: 90 TABLET | Refills: 0 | Status: SHIPPED | OUTPATIENT
Start: 2022-04-19 | End: 2022-07-12 | Stop reason: SDUPTHER

## 2022-04-19 NOTE — TELEPHONE ENCOUNTER
Spoke to [employer]  Cannot change code this is why patient was here    Called patient as well

## 2022-04-19 NOTE — TELEPHONE ENCOUNTER
[employer] regarding pt disputing charge with ins  Labs done  (Date of Service) 12/01/21    Pt is being charged for labs due to incorrect dx code  ( does not reflect "routine") and is not being covered by ins  Please advise   [phone]   ( Ref# [id number])

## 2022-06-23 DIAGNOSIS — F41.9 ANXIETY: ICD-10-CM

## 2022-06-24 RX ORDER — PAROXETINE 10 MG/1
TABLET, FILM COATED ORAL
Qty: 90 TABLET | Refills: 0 | Status: SHIPPED | OUTPATIENT
Start: 2022-06-24 | End: 2022-07-12 | Stop reason: SDUPTHER

## 2022-07-12 ENCOUNTER — OFFICE VISIT (OUTPATIENT)
Dept: INTERNAL MEDICINE CLINIC | Facility: CLINIC | Age: 72
End: 2022-07-12
Payer: COMMERCIAL

## 2022-07-12 VITALS
WEIGHT: 96.8 LBS | DIASTOLIC BLOOD PRESSURE: 66 MMHG | BODY MASS INDEX: 19.01 KG/M2 | TEMPERATURE: 97.5 F | SYSTOLIC BLOOD PRESSURE: 98 MMHG | HEIGHT: 60 IN | OXYGEN SATURATION: 97 % | RESPIRATION RATE: 17 BRPM | HEART RATE: 69 BPM

## 2022-07-12 DIAGNOSIS — E55.9 VITAMIN D DEFICIENCY: ICD-10-CM

## 2022-07-12 DIAGNOSIS — M81.0 OSTEOPOROSIS, POST-MENOPAUSAL: ICD-10-CM

## 2022-07-12 DIAGNOSIS — G43.909 MIGRAINE WITHOUT STATUS MIGRAINOSUS, NOT INTRACTABLE, UNSPECIFIED MIGRAINE TYPE: Primary | ICD-10-CM

## 2022-07-12 DIAGNOSIS — E78.2 MIXED HYPERLIPIDEMIA: ICD-10-CM

## 2022-07-12 DIAGNOSIS — F41.9 ANXIETY: ICD-10-CM

## 2022-07-12 DIAGNOSIS — D47.2 MGUS (MONOCLONAL GAMMOPATHY OF UNKNOWN SIGNIFICANCE): ICD-10-CM

## 2022-07-12 DIAGNOSIS — F51.01 PRIMARY INSOMNIA: ICD-10-CM

## 2022-07-12 DIAGNOSIS — Z12.31 VISIT FOR SCREENING MAMMOGRAM: ICD-10-CM

## 2022-07-12 PROCEDURE — 3725F SCREEN DEPRESSION PERFORMED: CPT | Performed by: NURSE PRACTITIONER

## 2022-07-12 PROCEDURE — 99214 OFFICE O/P EST MOD 30 MIN: CPT | Performed by: NURSE PRACTITIONER

## 2022-07-12 PROCEDURE — 1160F RVW MEDS BY RX/DR IN RCRD: CPT | Performed by: NURSE PRACTITIONER

## 2022-07-12 RX ORDER — AMOXICILLIN 500 MG/1
500 TABLET, FILM COATED ORAL 3 TIMES DAILY
COMMUNITY
Start: 2022-06-22

## 2022-07-12 RX ORDER — PAROXETINE 10 MG/1
10 TABLET, FILM COATED ORAL DAILY
Qty: 90 TABLET | Refills: 0 | Status: SHIPPED | OUTPATIENT
Start: 2022-07-12

## 2022-07-12 RX ORDER — ATORVASTATIN CALCIUM 20 MG/1
20 TABLET, FILM COATED ORAL DAILY
Qty: 90 TABLET | Refills: 0 | Status: SHIPPED | OUTPATIENT
Start: 2022-07-12 | End: 2022-10-10

## 2022-07-12 RX ORDER — TRAZODONE HYDROCHLORIDE 50 MG/1
50 TABLET ORAL
Qty: 30 TABLET | Refills: 0 | Status: SHIPPED | OUTPATIENT
Start: 2022-07-12 | End: 2022-08-12

## 2022-07-12 NOTE — ASSESSMENT & PLAN NOTE
The patient had a DEXA scan performed in October of 2021 which shows osteopenia  She continues on her vitamin-D and calcium supplementation  Repeat DEXA scan in 2023    10/18/2021      Interpretation:   The patient has osteopenia of the total lumbar spine, left femoral neck, and   total left hip as determined by WHO criteria  Based on the results of the   patient's bone density assessment, the risk of future fracture increases   approximately two fold for each 1 0 SD decrease in T-score

## 2022-07-12 NOTE — PATIENT INSTRUCTIONS
Calcium 1200 mg daily  Vitamin D3 1000 units daily    Osteopenia   AMBULATORY CARE:   Osteopenia  is a condition that causes bone loss and low bone mineral density (BMD)  Low BMD can weaken your bones and increase your risk for fractures  Osteopenia does not cause signs or symptoms  You may not know you have it until you break a bone  Osteopenia must be managed or treated so it does not worsen and become osteoporosis  Osteoporosis is a serious condition that causes bones to become weak, brittle, and easily fractured  Treatment for osteopenia  depends on your risk for fracture  If your risk is low, you may only need to make exercise, nutrition, and other lifestyle changes to manage osteopenia  The changes can help prevent more bone mineral loss and reduce your risk for fractures  If your risk is high, you may be given medicines to help strengthen your bones, prevent bone breakdown, or increase bone formation  Manage osteopenia:   Exercise as directed  Do weight-bearing exercises, such as brisk walking, dancing, or yoga  Weight-bearing exercises help build or maintain bone  Exercises such as swimming and bike riding are non-weight-bearing and will not build or maintain bone  Weightlifting also helps strengthen bones and build muscle  Extra muscle can help protect your bones  Your healthcare provider may recommend weightlifting 3 times per week as part of your exercise routine  Increase calcium and vitamin D as directed  Calcium and vitamin D work together to help build bone  The body deposits calcium into the bones until about age 27  You will then need to get enough calcium and vitamin D to maintain what your bones are storing  Your healthcare provider may tell you to eat more dairy products, such as milk and cheese, for calcium  Spinach, salmon, and dried beans are also good sources of calcium  Cereal, bread, and orange juice may be fortified with vitamin D   You also get vitamin D from exposure to sunlight  Your healthcare provider may also suggest a calcium or vitamin D supplement  Do not take supplements unless directed  Limit or do not drink alcohol as directed  Alcohol can take calcium from your bones and increase your risk for fractures  Ask your healthcare provider if it is safe for you to drink alcohol  Women should limit alcohol to 1 drink per day  Men should limit alcohol to 2 drinks per day  A drink of alcohol is 12 ounces of beer, 5 ounces of wine, or 1½ ounces of liquor  Do not smoke  Nicotine can damage blood vessels and make it more difficult to manage your osteopenia  Smoking also affects bone density and increases your risk for bone fractures  Do not use e-cigarettes or smokeless tobacco in place of cigarettes or to help you quit  They still contain nicotine  Ask your healthcare provider for information if you currently smoke and need help quitting  Ask your healthcare provider for information if you need help quitting  Prevent falls  Decrease your risk for falling by removing items from the floor and removing loose carpets  Turn the lights on where you will be walking  Follow up with your doctor as directed:  Write down your questions so you remember to ask them during your visits  © Copyright [address] 2022 Information is for End User's use only and may not be sold, redistributed or otherwise used for commercial purposes  All illustrations and images included in CareNotes® are the copyrighted property of [employer]  or Bhavana James  The above information is an  only  It is not intended as medical advice for individual conditions or treatments  Talk to your doctor, nurse or pharmacist before following any medical regimen to see if it is safe and effective for you

## 2022-07-12 NOTE — PROGRESS NOTES
[facility] - [facility]    NAME: Nikki Millard  AGE: 73 y o  SEX: female  : 1950     DATE: 2022     Assessment and Plan:     Problem List Items Addressed This Visit        Cardiovascular and Mediastinum    Migraine - Primary       The patient continues to follow with Neurology  She continues with her Imitrex  Her migraines are sporadic and seemed to be initiated by stress  Relevant Medications    traZODone (DESYREL) 50 mg tablet    PARoxetine (PAXIL) 10 mg tablet       Musculoskeletal and Integument    Osteoporosis, post-menopausal       The patient had a DEXA scan performed in 2021 which shows osteopenia  She continues on her vitamin-D and calcium supplementation  Repeat DEXA scan in 2023    10/18/2021      Interpretation:   The patient has osteopenia of the total lumbar spine, left femoral neck, and   total left hip as determined by WHO criteria  Based on the results of the   patient's bone density assessment, the risk of future fracture increases   approximately two fold for each 1 0 SD decrease in T-score  Other    Hyperlipidemia    Relevant Medications    atorvastatin (LIPITOR) 20 mg tablet    Other Relevant Orders    Lipid Panel with Direct LDL reflex    CBC and differential    Basic metabolic panel    MGUS (monoclonal gammopathy of unknown significance)      Continued follow-up with Hematology  Anxiety       Patient was started on Paxil this year with good results  She continues on this medication  Relevant Medications    PARoxetine (PAXIL) 10 mg tablet    Primary insomnia       Patient continues on her trazodone as needed             Relevant Medications    traZODone (DESYREL) 50 mg tablet      Other Visit Diagnoses     Visit for screening mammogram        Relevant Orders    Mammo screening bilateral w 3d & cad    Vitamin D deficiency        Relevant Orders    Vitamin D 25 hydroxy Depression Screening and Follow-up Plan: Patient was screened for depression during today's encounter  They screened negative with a PHQ-2 score of 0  Return in about 6 months (around 1/12/2023) for Dr Marah Vee  Chief Complaint:     Chief Complaint   Patient presents with    Follow-up     6 months        History of Present Illness:       Patient presents to the office today for follow-up  She has no new medical concerns  She will be due for blood work in 6 months  She continues to follow with Hematology  She is up-to-date with mammograms and DEXA scans  She will be due for colonoscopy in 2027  She will be seen back in the office in 6 months  Review of Systems:     Review of Systems   Constitutional: Negative for activity change, fatigue and fever  HENT: Negative for congestion, hearing loss, rhinorrhea, trouble swallowing and voice change  Eyes: Negative for photophobia, pain, discharge and visual disturbance  Respiratory: Negative for cough, chest tightness and shortness of breath  Cardiovascular: Negative for chest pain, palpitations and leg swelling  Gastrointestinal: Negative for abdominal pain, blood in stool, constipation, nausea and vomiting  Endocrine: Negative for cold intolerance and heat intolerance  Genitourinary: Negative for difficulty urinating, frequency, hematuria, urgency, vaginal bleeding and vaginal discharge  Musculoskeletal: Positive for arthralgias  Negative for myalgias  Skin: Negative  Neurological: Negative for dizziness, weakness, numbness and headaches  Psychiatric/Behavioral: Negative for decreased concentration  The patient is nervous/anxious           Problem List:     Patient Active Problem List   Diagnosis    Hyperlipidemia    MGUS (monoclonal gammopathy of unknown significance)    Migraine    Osteoporosis, post-menopausal    Arthritis    Abnormal transaminases    Steatosis ([address] )    Need for vaccination for pneumococcus    Actinic keratosis    Seborrheic keratosis    Screening for skin condition    History of skin cancer    Dysuria    Healthcare maintenance    Anxiety    Primary insomnia        Objective:     BP 98/66 (BP Location: Left arm, Patient Position: Sitting, Cuff Size: Standard)   Pulse 69   Temp 97 5 °F (36 4 °C) (Temporal) Comment: NO NSAIDS  Resp 17   Ht 5' (1 524 m)   Wt 43 9 kg (96 lb 12 8 oz)   SpO2 97%   BMI 18 90 kg/m²     Current Outpatient Medications   Medication Instructions    amoxicillin (AMOXIL) 500 mg, Oral, 3 times daily, Takes whenever she has dental work  07/12/22    aspirin 81 MG tablet Oral    atorvastatin (LIPITOR) 20 mg, Oral, Daily    BIOTIN PO Oral    Calcium Carbonate-Vitamin D 600-200 MG-UNIT TABS Oral    multivitamin (THERAGRAN) TABS Oral    PARoxetine (PAXIL) 10 mg, Oral, Daily    SUMAtriptan (IMITREX) 100 mg tablet 1 tablet as needed for headache    traZODone (DESYREL) 50 mg, Oral, Daily at bedtime         Physical Exam  Vitals reviewed  Constitutional:       Appearance: Normal appearance  She is obese  HENT:      Head: Normocephalic  Nose: Nose normal       Mouth/Throat:      Mouth: Mucous membranes are moist       Pharynx: Oropharynx is clear  Eyes:      Extraocular Movements: Extraocular movements intact  Pupils: Pupils are equal, round, and reactive to light  Cardiovascular:      Rate and Rhythm: Normal rate and regular rhythm  Pulses: Normal pulses  Heart sounds: Normal heart sounds  Pulmonary:      Effort: Pulmonary effort is normal       Breath sounds: Normal breath sounds  Musculoskeletal:         General: Normal range of motion  Skin:     General: Skin is warm and dry  Neurological:      General: No focal deficit present  Mental Status: She is alert and oriented to person, place, and time  Psychiatric:         Mood and Affect: Mood normal          Behavior: Behavior normal          Thought Content:  Thought content normal  Judgment: Judgment normal          Panfilo Levin, [facility]

## 2022-07-12 NOTE — ASSESSMENT & PLAN NOTE
The patient continues to follow with Neurology  She continues with her Imitrex  Her migraines are sporadic and seemed to be initiated by stress

## 2022-08-22 ENCOUNTER — APPOINTMENT (OUTPATIENT)
Dept: LAB | Facility: CLINIC | Age: 72
End: 2022-08-22
Payer: COMMERCIAL

## 2022-08-22 DIAGNOSIS — E78.2 MIXED HYPERLIPIDEMIA: ICD-10-CM

## 2022-08-22 DIAGNOSIS — E55.9 VITAMIN D DEFICIENCY: ICD-10-CM

## 2022-08-22 LAB
25(OH)D3 SERPL-MCNC: 54.6 NG/ML (ref 30–100)
ANION GAP SERPL CALCULATED.3IONS-SCNC: 6 MMOL/L (ref 4–13)
BASOPHILS # BLD AUTO: 0.04 THOUSANDS/ΜL (ref 0–0.1)
BASOPHILS NFR BLD AUTO: 1 % (ref 0–1)
BUN SERPL-MCNC: 17 MG/DL (ref 5–25)
CALCIUM SERPL-MCNC: 8.9 MG/DL (ref 8.3–10.1)
CHLORIDE SERPL-SCNC: 105 MMOL/L (ref 96–108)
CHOLEST SERPL-MCNC: 151 MG/DL
CO2 SERPL-SCNC: 29 MMOL/L (ref 21–32)
CREAT SERPL-MCNC: 0.85 MG/DL (ref 0.6–1.3)
EOSINOPHIL # BLD AUTO: 0.15 THOUSAND/ΜL (ref 0–0.61)
EOSINOPHIL NFR BLD AUTO: 3 % (ref 0–6)
ERYTHROCYTE [DISTWIDTH] IN BLOOD BY AUTOMATED COUNT: 11.9 % (ref 11.6–15.1)
GFR SERPL CREATININE-BSD FRML MDRD: 70 ML/MIN/1.73SQ M
GLUCOSE P FAST SERPL-MCNC: 93 MG/DL (ref 65–99)
HCT VFR BLD AUTO: 41.3 % (ref 34.8–46.1)
HDLC SERPL-MCNC: 93 MG/DL
HGB BLD-MCNC: 13.6 G/DL (ref 11.5–15.4)
IMM GRANULOCYTES # BLD AUTO: 0.01 THOUSAND/UL (ref 0–0.2)
IMM GRANULOCYTES NFR BLD AUTO: 0 % (ref 0–2)
LDLC SERPL CALC-MCNC: 48 MG/DL (ref 0–100)
LYMPHOCYTES # BLD AUTO: 1.35 THOUSANDS/ΜL (ref 0.6–4.47)
LYMPHOCYTES NFR BLD AUTO: 31 % (ref 14–44)
MCH RBC QN AUTO: 31.5 PG (ref 26.8–34.3)
MCHC RBC AUTO-ENTMCNC: 32.9 G/DL (ref 31.4–37.4)
MCV RBC AUTO: 96 FL (ref 82–98)
MONOCYTES # BLD AUTO: 0.46 THOUSAND/ΜL (ref 0.17–1.22)
MONOCYTES NFR BLD AUTO: 11 % (ref 4–12)
NEUTROPHILS # BLD AUTO: 2.35 THOUSANDS/ΜL (ref 1.85–7.62)
NEUTS SEG NFR BLD AUTO: 54 % (ref 43–75)
NRBC BLD AUTO-RTO: 0 /100 WBCS
PLATELET # BLD AUTO: 238 THOUSANDS/UL (ref 149–390)
PMV BLD AUTO: 10.5 FL (ref 8.9–12.7)
POTASSIUM SERPL-SCNC: 3.5 MMOL/L (ref 3.5–5.3)
RBC # BLD AUTO: 4.32 MILLION/UL (ref 3.81–5.12)
SODIUM SERPL-SCNC: 140 MMOL/L (ref 135–147)
TRIGL SERPL-MCNC: 52 MG/DL
WBC # BLD AUTO: 4.36 THOUSAND/UL (ref 4.31–10.16)

## 2022-08-22 PROCEDURE — 80061 LIPID PANEL: CPT

## 2022-08-22 PROCEDURE — 80048 BASIC METABOLIC PNL TOTAL CA: CPT

## 2022-08-22 PROCEDURE — 36415 COLL VENOUS BLD VENIPUNCTURE: CPT

## 2022-08-22 PROCEDURE — 82306 VITAMIN D 25 HYDROXY: CPT

## 2022-08-22 PROCEDURE — 85025 COMPLETE CBC W/AUTO DIFF WBC: CPT

## 2022-08-23 ENCOUNTER — OFFICE VISIT (OUTPATIENT)
Dept: INTERNAL MEDICINE CLINIC | Facility: CLINIC | Age: 72
End: 2022-08-23
Payer: COMMERCIAL

## 2022-08-23 ENCOUNTER — PREP FOR PROCEDURE (OUTPATIENT)
Dept: GASTROENTEROLOGY | Facility: CLINIC | Age: 72
End: 2022-08-23

## 2022-08-23 VITALS
BODY MASS INDEX: 18.42 KG/M2 | TEMPERATURE: 97 F | WEIGHT: 93.8 LBS | DIASTOLIC BLOOD PRESSURE: 60 MMHG | SYSTOLIC BLOOD PRESSURE: 102 MMHG | HEART RATE: 60 BPM | OXYGEN SATURATION: 99 % | HEIGHT: 60 IN

## 2022-08-23 DIAGNOSIS — Z86.010 HISTORY OF COLON POLYPS: ICD-10-CM

## 2022-08-23 DIAGNOSIS — Z00.00 HEALTHCARE MAINTENANCE: Primary | ICD-10-CM

## 2022-08-23 DIAGNOSIS — Z86.010 HISTORY OF COLON POLYPS: Primary | ICD-10-CM

## 2022-08-23 PROCEDURE — 3288F FALL RISK ASSESSMENT DOCD: CPT | Performed by: INTERNAL MEDICINE

## 2022-08-23 PROCEDURE — 99397 PER PM REEVAL EST PAT 65+ YR: CPT | Performed by: INTERNAL MEDICINE

## 2022-08-23 PROCEDURE — 1160F RVW MEDS BY RX/DR IN RCRD: CPT | Performed by: INTERNAL MEDICINE

## 2022-08-23 PROCEDURE — 1101F PT FALLS ASSESS-DOCD LE1/YR: CPT | Performed by: INTERNAL MEDICINE

## 2022-08-23 NOTE — PROGRESS NOTES
Assessment/Plan:       Diagnoses and all orders for this visit:    Healthcare maintenance    History of colon polyps  -     Ambulatory referral for colonoscopy; Future                Subjective:      Patient ID: Charly Jordan is a 73 y o  female  30-year-old female  Only retired a few weeks ago and is still on [employer], not [employer]  She has diagnoses which of followed but she has no symptoms at all  Monoclonal gammopathy of uncertain significance followed by Hematology  Steatosis without clinical sequelae  Hyperlipidemia; treated with Lipitor  History of migraine headaches; not very active  The patient uses some tripped and rarely but manages to stay ahead of this by keeping herself hydrated    Colonoscopy up-to-date  Last time February 2017 was clean  Due to a prior history colon polyps should be done again in 2022    Mammogram up-to-date              The following portions of the patient's history were reviewed and updated as appropriate:   She has a past medical history of H/O nonmelanoma skin cancer  ,  does not have any pertinent problems on file  ,   has a past surgical history that includes Colonoscopy and [place] tooth extraction (06/2022)  ,  family history is not on file  ,   reports that she has never smoked  She has never used smokeless tobacco  She reports current alcohol use  She reports that she does not use drugs  ,  is allergic to dust mite extract and pollen extract     Current Outpatient Medications   Medication Sig Dispense Refill    amoxicillin (AMOXIL) 500 MG tablet Take 500 mg by mouth 3 (three) times a day Takes whenever she has dental work  07/12/22      aspirin 81 MG tablet Take by mouth      atorvastatin (LIPITOR) 20 mg tablet Take 1 tablet (20 mg total) by mouth daily 90 tablet 0    BIOTIN PO Take by mouth      Calcium Carbonate-Vitamin D 600-200 MG-UNIT TABS Take by mouth      multivitamin (THERAGRAN) TABS Take by mouth      PARoxetine (PAXIL) 10 mg tablet Take 1 tablet (10 mg total) by mouth daily 90 tablet 0    SUMAtriptan (IMITREX) 100 mg tablet 1 tablet as needed for headache 10 tablet 5    traZODone (DESYREL) 50 mg tablet TAKE 1 TABLET BY MOUTH DAILY AT BEDTIME 90 tablet 1     No current facility-administered medications for this visit  Review of Systems   Musculoskeletal: Positive for arthralgias  Neurological: Positive for headaches  Psychiatric/Behavioral: The patient is nervous/anxious  Objective:  Vitals:    08/23/22 0826   BP: 102/60   Pulse: 60   Temp: (!) 97 °F (36 1 °C)   SpO2: 99%      Physical Exam  Constitutional:       Appearance: She is well-developed  Comments: A thin female patient who appears to be the stated age but who appears well  HENT:      Head: Normocephalic and atraumatic  Eyes:      General: No scleral icterus  Pupils: Pupils are equal, round, and reactive to light  Neck:      Thyroid: No thyromegaly  Trachea: No tracheal deviation  Cardiovascular:      Rate and Rhythm: Normal rate and regular rhythm  Heart sounds: Normal heart sounds  No murmur heard  No gallop  Pulmonary:      Effort: No respiratory distress  Breath sounds: No wheezing or rales  Abdominal:      General: Bowel sounds are normal       Palpations: Abdomen is soft  Tenderness: There is no abdominal tenderness  Musculoskeletal:         General: No tenderness or deformity  Normal range of motion  Cervical back: Normal range of motion and neck supple  Skin:     General: Skin is warm  Neurological:      Mental Status: She is alert and oriented to person, place, and time  Coordination: Coordination normal    Psychiatric:         Judgment: Judgment normal            Patient Instructions   Chronic diagnoses are stable   Needs a colonoscopy   Yearly follow-up

## 2022-08-25 ENCOUNTER — ANESTHESIA (OUTPATIENT)
Dept: GASTROENTEROLOGY | Facility: HOSPITAL | Age: 72
End: 2022-08-25

## 2022-08-25 ENCOUNTER — HOSPITAL ENCOUNTER (OUTPATIENT)
Dept: GASTROENTEROLOGY | Facility: HOSPITAL | Age: 72
Setting detail: OUTPATIENT SURGERY
End: 2022-08-25
Attending: INTERNAL MEDICINE
Payer: COMMERCIAL

## 2022-08-25 ENCOUNTER — ANESTHESIA EVENT (OUTPATIENT)
Dept: GASTROENTEROLOGY | Facility: HOSPITAL | Age: 72
End: 2022-08-25

## 2022-08-25 VITALS
OXYGEN SATURATION: 99 % | WEIGHT: 89.95 LBS | SYSTOLIC BLOOD PRESSURE: 116 MMHG | DIASTOLIC BLOOD PRESSURE: 59 MMHG | HEIGHT: 60 IN | HEART RATE: 50 BPM | BODY MASS INDEX: 17.66 KG/M2 | TEMPERATURE: 97.7 F | RESPIRATION RATE: 16 BRPM

## 2022-08-25 DIAGNOSIS — Z86.010 HISTORY OF COLON POLYPS: ICD-10-CM

## 2022-08-25 PROCEDURE — 45380 COLONOSCOPY AND BIOPSY: CPT | Performed by: INTERNAL MEDICINE

## 2022-08-25 PROCEDURE — 88305 TISSUE EXAM BY PATHOLOGIST: CPT | Performed by: STUDENT IN AN ORGANIZED HEALTH CARE EDUCATION/TRAINING PROGRAM

## 2022-08-25 RX ORDER — SODIUM CHLORIDE, SODIUM LACTATE, POTASSIUM CHLORIDE, CALCIUM CHLORIDE 600; 310; 30; 20 MG/100ML; MG/100ML; MG/100ML; MG/100ML
INJECTION, SOLUTION INTRAVENOUS CONTINUOUS PRN
Status: DISCONTINUED | OUTPATIENT
Start: 2022-08-25 | End: 2022-08-25

## 2022-08-25 RX ORDER — PROPOFOL 10 MG/ML
INJECTION, EMULSION INTRAVENOUS AS NEEDED
Status: DISCONTINUED | OUTPATIENT
Start: 2022-08-25 | End: 2022-08-25

## 2022-08-25 RX ADMIN — PROPOFOL 20 MG: 10 INJECTION, EMULSION INTRAVENOUS at 09:11

## 2022-08-25 RX ADMIN — SODIUM CHLORIDE, SODIUM LACTATE, POTASSIUM CHLORIDE, AND CALCIUM CHLORIDE: .6; .31; .03; .02 INJECTION, SOLUTION INTRAVENOUS at 08:50

## 2022-08-25 RX ADMIN — PROPOFOL 30 MG: 10 INJECTION, EMULSION INTRAVENOUS at 09:04

## 2022-08-25 RX ADMIN — PROPOFOL 60 MG: 10 INJECTION, EMULSION INTRAVENOUS at 08:58

## 2022-08-25 NOTE — ANESTHESIA PREPROCEDURE EVALUATION
Procedure:  COLONOSCOPY    Relevant Problems   CARDIO   (+) Hyperlipidemia   (+) Migraine      MUSCULOSKELETAL   (+) Arthritis      NEURO/PSYCH   (+) Anxiety   (+) History of skin cancer   (+) Migraine      Hyperlipidemia   MGUS (monoclonal gammopathy of unknown significance)   Migraine   Osteoporosis, post-menopausal   Arthritis   Abnormal transaminases   Steatosis (HCC)   Need for vaccination for pneumococcus   Actinic keratosis   Seborrheic keratosis   Screening for skin condition   History of skin cancer   Dysuria   Healthcare maintenance   Anxiety   Primary insomnia         Physical Exam    Airway    Mallampati score: II  TM Distance: >3 FB  Neck ROM: full     Dental       Cardiovascular  Cardiovascular exam normal    Pulmonary  Pulmonary exam normal     Other Findings        Anesthesia Plan  ASA Score- 2     Anesthesia Type- IV sedation with anesthesia with ASA Monitors  Additional Monitors:   Airway Plan:           Plan Factors-Exercise tolerance (METS): >4 METS  Chart reviewed  EKG reviewed  Imaging results reviewed  Existing labs reviewed  Patient summary reviewed  Induction- intravenous  Postoperative Plan-     Informed Consent- Anesthetic plan and risks discussed with patient  I personally reviewed this patient with the CRNA  Discussed and agreed on the Anesthesia Plan with the CRNA  Dorys Colbert

## 2022-08-25 NOTE — H&P
History and Physical - [facility]  Francicso Aleman 73 y o  female MRN: [id number]                  HPI: Francisco Aleman is a 73y o  year old female who presents for colonoscopy for history of colon polyps  Last colonoscopy 5 years ago      REVIEW OF SYSTEMS: Per the HPI, and otherwise unremarkable  Historical Information   Past Medical History:   Diagnosis Date    Colon polyp     H/O nonmelanoma skin cancer     last assessed 8/16/17     Past Surgical History:   Procedure Laterality Date    COLONOSCOPY      last assessed 12/31/14, next in 2016   [address] [facility] EXTRACTION  06/2022     Social History   Social History     Substance and Sexual Activity   Alcohol Use Yes    Alcohol/week: 2 0 standard drinks    Types: 2 Glasses of wine per week    Comment: socially     Social History     Substance and Sexual Activity   Drug Use No     Social History     Tobacco Use   Smoking Status Never Smoker   Smokeless Tobacco Never Used     History reviewed  No pertinent family history  Meds/Allergies     (Not in a hospital admission)      Allergies   Allergen Reactions    Dust Mite Extract     Pollen Extract        Objective     Blood pressure 123/59, pulse 59, temperature (!) 97 2 °F (36 2 °C), temperature source Temporal, resp  rate 20, height 5' (1 524 m), weight 40 8 kg (89 lb 15 2 oz), SpO2 100 %        PHYSICAL EXAM    Gen: NAD  CV: RRR  CHEST: Clear  ABD: soft, NT/ND  EXT: no edema  Neuro: AAO      ASSESSMENT/PLAN:  This is a 73y o  year old female here for history of colon polyps    PLAN:   Procedure:  Colonoscopy

## 2022-08-30 PROCEDURE — 88305 TISSUE EXAM BY PATHOLOGIST: CPT | Performed by: STUDENT IN AN ORGANIZED HEALTH CARE EDUCATION/TRAINING PROGRAM

## 2022-10-07 ENCOUNTER — TELEPHONE (OUTPATIENT)
Dept: INTERNAL MEDICINE CLINIC | Facility: CLINIC | Age: 72
End: 2022-10-07

## 2022-10-10 DIAGNOSIS — E78.2 MIXED HYPERLIPIDEMIA: ICD-10-CM

## 2022-10-10 RX ORDER — ATORVASTATIN CALCIUM 20 MG/1
TABLET, FILM COATED ORAL
Qty: 90 TABLET | Refills: 0 | Status: SHIPPED | OUTPATIENT
Start: 2022-10-10

## 2022-10-11 ENCOUNTER — TELEPHONE (OUTPATIENT)
Dept: INTERNAL MEDICINE CLINIC | Facility: CLINIC | Age: 72
End: 2022-10-11

## 2022-10-11 NOTE — TELEPHONE ENCOUNTER
PROLIA-pt trquesting change of tx site from [facility] to [facility]/Dr Zaki Junior  [facility] no longer administers    Spoke w/pt--pt stated last admin was >1 yr ago due to dental issues  Will get letter from dentist, Dr Chris Brizuela, stating she may resume Prolia  Pt getting Medicare and new supplemental insurance  Will stop by office to give copies of ins cards for [employer] processing      Please advise

## 2022-10-11 NOTE — TELEPHONE ENCOUNTER
PROLIA-pt to change tx site form [facility] to [facility]    Spoke w/pt--pt stated last admin was >1 yr ago due to dental issues  Will get letter from dentist, Dr Barrera Last, stating she may resume Prolia  Pt getting Medicare and new supplemental insurance  Will stop by office to give copies of ins cards for [employer] processing

## 2022-10-12 ENCOUNTER — TELEPHONE (OUTPATIENT)
Dept: INTERNAL MEDICINE CLINIC | Facility: CLINIC | Age: 72
End: 2022-10-12

## 2022-10-13 DIAGNOSIS — M81.0 OSTEOPOROSIS, POST-MENOPAUSAL: Primary | ICD-10-CM

## 2022-10-13 RX ORDER — ALENDRONATE SODIUM 70 MG/1
70 TABLET ORAL
Qty: 12 TABLET | Refills: 3 | Status: SHIPPED | OUTPATIENT
Start: 2022-10-13 | End: 2023-07-26

## 2022-10-18 ENCOUNTER — TELEPHONE (OUTPATIENT)
Dept: INTERNAL MEDICINE CLINIC | Facility: CLINIC | Age: 72
End: 2022-10-18

## 2022-10-20 DIAGNOSIS — G43.909 MIGRAINE WITHOUT STATUS MIGRAINOSUS, NOT INTRACTABLE, UNSPECIFIED MIGRAINE TYPE: ICD-10-CM

## 2022-10-21 RX ORDER — SUMATRIPTAN 100 MG/1
TABLET, FILM COATED ORAL
Qty: 10 TABLET | Refills: 0 | Status: SHIPPED | OUTPATIENT
Start: 2022-10-21

## 2022-11-16 DIAGNOSIS — F51.01 PRIMARY INSOMNIA: ICD-10-CM

## 2022-11-16 RX ORDER — TRAZODONE HYDROCHLORIDE 50 MG/1
50 TABLET ORAL
Qty: 90 TABLET | Refills: 3 | Status: SHIPPED | OUTPATIENT
Start: 2022-11-16

## 2022-11-16 NOTE — TELEPHONE ENCOUNTER
----- Message from Juan Borja sent at 11/16/2022  8:25 AM EST -----  Regarding: refill prescription for trazodone  Contact: [phone]  kraig Rankin says I can't request a refill of trazodone 50mg in the normal way and must ask through a message  Can this be refilled soon since I may be out of state soon for a few weeks    Thank you,  Juan Borja

## 2022-11-28 DIAGNOSIS — F41.9 ANXIETY: ICD-10-CM

## 2022-11-28 RX ORDER — PAROXETINE 10 MG/1
TABLET, FILM COATED ORAL
Qty: 90 TABLET | Refills: 0 | Status: SHIPPED | OUTPATIENT
Start: 2022-11-28

## 2023-02-10 DIAGNOSIS — E78.2 MIXED HYPERLIPIDEMIA: ICD-10-CM

## 2023-02-10 RX ORDER — ATORVASTATIN CALCIUM 20 MG/1
TABLET, FILM COATED ORAL
Qty: 90 TABLET | Refills: 0 | Status: SHIPPED | OUTPATIENT
Start: 2023-02-10

## 2023-03-06 ENCOUNTER — TELEPHONE (OUTPATIENT)
Dept: HEMATOLOGY ONCOLOGY | Facility: CLINIC | Age: 73
End: 2023-03-06

## 2023-03-06 NOTE — TELEPHONE ENCOUNTER
Appointment Cancellation or Reschedule     Person calling in Patient   If someone other than patient calling, are they listed on the communication consent form? N/A   Provider Dr Cristiano Chi   Office Visit Date and Time  03/15/2023 @8:40AM    Office Visit Location [place]   Did patient want to reschedule their office appointment? If so, when was it scheduled to? 05/23/2023 @3PM with Jason Ho   Did you have STAR scheduled for this appointment? No   Do you need STAR set up for your new appointment? If yes, please send to "PATIENT RIDESHARE" pool for STAR rescheduling No   Is this patient calling to reschedule an infusion appointment? No   When is their next infusion appointment? N/A   Is this patient a Chemo patient? No   Reason for Cancellation or Reschedule Provider not available    Was No show policy reviewed with patient if patient canceling within 24 hours? Yes     If the patient is cancelling an appointment and needs their STAR Transport cancelled, please route to [address]  If the patient is a treatment patient, please route this to the office nurse  If the patient is not on treatment, please route to the Clerical pool based on location  If the patient is a surgical oncology patient, please route to surg/onc clinical pool  Route message as high priority if same day cancellation

## 2023-03-06 NOTE — TELEPHONE ENCOUNTER
LVM advising patient that Dr Yuko Porter will be leaving the practice as of 3/9/2023  Patient may choose to see Dr Yuko Porter on his last day in the [facility] location on 3/9/2023, limited appointment slots available  If not, the patient must schedule a Transfer of Care appointment with Adelaide Cunningham PA-C in the [facility]

## 2023-03-30 ENCOUNTER — OFFICE VISIT (OUTPATIENT)
Dept: DERMATOLOGY | Facility: CLINIC | Age: 73
End: 2023-03-30

## 2023-03-30 VITALS — BODY MASS INDEX: 19.87 KG/M2 | HEIGHT: 60 IN | WEIGHT: 101.2 LBS

## 2023-03-30 DIAGNOSIS — Z13.89 SCREENING FOR SKIN CONDITION: ICD-10-CM

## 2023-03-30 DIAGNOSIS — L82.1 SEBORRHEIC KERATOSIS: ICD-10-CM

## 2023-03-30 DIAGNOSIS — Z85.828 HISTORY OF SKIN CANCER: ICD-10-CM

## 2023-03-30 DIAGNOSIS — L57.0 ACTINIC KERATOSIS: Primary | ICD-10-CM

## 2023-03-30 RX ORDER — SUMATRIPTAN 50 MG/1
TABLET, FILM COATED ORAL
COMMUNITY
Start: 2023-01-26

## 2023-03-30 NOTE — PROGRESS NOTES
[address] [facility] [place] DERMATOLOGY  [address] [place] [id number]  [phone]  [phone]     MRN: [id number] : 1950  Encounter: [id number]  Patient Information: Steve Beckwith  Chief complaint: yearly check up    History of present illness: 82-year-old female presents for overall skin check previous history nonmelanoma skin cancer actinic keratosis note some scaly areas on the face again no other concerns noted  Past Medical History:   Diagnosis Date   • Colon polyp    • H/O nonmelanoma skin cancer     last assessed 17     Past Surgical History:   Procedure Laterality Date   • COLONOSCOPY      last assessed 14, next in 2016   • [address] EXTRACTION  2022     Social History   Social History     Substance and Sexual Activity   Alcohol Use Yes   • Alcohol/week: 2 0 standard drinks   • Types: 2 Glasses of wine per week    Comment: socially     Social History     Substance and Sexual Activity   Drug Use No     Social History     Tobacco Use   Smoking Status Never   Smokeless Tobacco Never     No family history on file  Meds/Allergies   Allergies   Allergen Reactions   • Dust Mite Extract    • Pollen Extract        Meds:  Prior to Admission medications    Medication Sig Start Date End Date Taking?  Authorizing Provider   alendronate (Fosamax) 70 mg tablet Take 1 tablet (70 mg total) by mouth every 7 days 10/13/22  Yes Victor Hugo Juan MD   aspirin 81 MG tablet Take by mouth   Yes Historical Provider, MD   atorvastatin (LIPITOR) 20 mg tablet TAKE 1 TABLET BY MOUTH EVERY DAY 2/10/23  Yes Victor Hugo Juan MD   BIOTIN PO Take by mouth   Yes Historical Provider, MD   Calcium Carbonate-Vitamin D 600-200 MG-UNIT TABS Take by mouth   Yes Historical Provider, MD   multivitamin (THERAGRAN) TABS Take by mouth   Yes Historical Provider, MD   PARoxetine (PAXIL) 10 mg tablet TAKE 1 TABLET BY MOUTH EVERY DAY 23  Yes Victor Hugo Juan MD   SUMAtriptan (IMITREX) 50 mg tablet 1 (ONE) TABLET BY MOUTH DAILY, AS NEEDED, TAKE ONE TABLET AT HEADACHE ONSET 1/26/23  Yes Historical Provider, MD   traZODone (DESYREL) 50 mg tablet Take 1 tablet (50 mg total) by mouth daily at bedtime 11/16/22  Yes Laura Nunez MD   amoxicillin (AMOXIL) 500 MG tablet Take 500 mg by mouth 3 (three) times a day Takes whenever she has dental work  07/12/22 6/22/22   Historical Provider, MD   SUMAtriptan (IMITREX) 100 mg tablet 1 tablet as needed for headache  Patient not taking: Reported on 3/30/2023 10/21/22   Laura Nunez MD       Subjective:     Review of Systems:    General: negative for - chills, fatigue, fever,  weight gain or weight loss  Psychological: negative for - anxiety, behavioral disorder, concentration difficulties, decreased libido, depression, irritability, memory difficulties, mood swings, sleep disturbances or suicidal ideation  ENT: negative for - hearing difficulties , nasal congestion, nasal discharge, oral lesions, sinus pain, sneezing, sore throat  Allergy and Immunology: negative for - hives, insect bite sensitivity,  Hematological and Lymphatic: negative for - bleeding problems, blood clots,bruising, swollen lymph nodes  Endocrine: negative for - hair pattern changes, hot flashes, malaise/lethargy, mood swings, palpitations, polydipsia/polyuria, skin changes, temperature intolerance or unexpected weight change  Respiratory: negative for - cough, hemoptysis, orthopnea, shortness of breath, or wheezing  Cardiovascular: negative for - chest pain, dyspnea on exertion, edema,  Gastrointestinal: negative for - abdominal pain, nausea/vomiting  Genito-Urinary: negative for - dysuria, incontinence, irregular/heavy menses or urinary frequency/urgency  Musculoskeletal: negative for - gait disturbance, joint pain, joint stiffness, joint swelling, muscle pain, muscular weakness  Dermatological:  As in HPI  Neurological: negative for confusion, dizziness, headaches, impaired coordination/balance, memory loss, numbness/tingling, seizures, speech problems, tremors or weakness       Objective:   Ht 5' (1 524 m)   Wt 45 9 kg (101 lb 3 2 oz)   BMI 19 76 kg/m²     Physical Exam:    General Appearance:    Alert, cooperative, no distress   Head:    Normocephalic, without obvious abnormality, atraumatic           Skin:   A full skin exam was performed including scalp, head scalp, eyes, ears, nose, lips, neck, chest, axilla, abdomen, back, buttocks, bilateral upper extremities, bilateral lower extremities, hands, feet, fingers, toes, fingernails, and toenails scaling erythematous area noted below normal keratotic papules with greasy stuck on appearance previous sites of skin cancer well-healed without recurrence  Physical Exam  HENT:      Head:          Cryotherapy Procedure Note    Pre-operative Diagnosis: actinic keratosis    Plan:  1  Instructed to keep the area dry and clean thereafter  Apply petrolatum if area gets crusty  2  Recommended that the patient use acetaminophen  as needed for pain  Locations: Left eyebrow and nose    Indications: Destruction of actinic keratosis x2    Patient informed of risks (permanent scarring, infection, light or dark discoloration, bleeding, infection, weakness, numbness and recurrence of the lesion) and benefits of the procedure and verbal informed consent obtained  The areas are treated with liquid nitrogen therapy, frozen until ice ball extended 2 mm beyond lesion, allowed to thaw, and treated again  The patient tolerated procedure well  The patient was instructed on post-op care, warned that there may be blister formation, redness and pain  Recommend OTC analgesia as needed for pain  Condition:  Stable    Complications:  none  Assessment:     1  Actinic keratosis        2  Seborrheic keratosis        3  Screening for skin condition        4   History of skin cancer              Plan:   Actinic Keratosis:  Patient advised lesions "are precancers  These should resolve with cryosurgery if not to let us know sun block recommended  Seborrheic keratosis patient reassured these are normal growths we acquire with age no treatment needed  History of skin cancer in no recurrence nothing else atypical sunblock recommended follow-up in 1 year  Screening for dermatologic disorders nothing else of concern noted on complete exam follow-up in 1 year      Kori Garvey MD  3/30/2023,3:26 PM    Portions of the record may have been created with voice recognition software   Occasional wrong word or \"sound a like\" substitutions may have occurred due to the inherent limitations of voice recognition software   Read the chart carefully and recognize, using context, where substitutions have occurred    "

## 2023-03-30 NOTE — PROGRESS NOTES
"[facility] Dermatology Clinic Note     Patient Name: Regino Yoder  Encounter Date: March 30, 2023     Have you been cared for by a [facility] Dermatologist in the last 3 years and, if so, which description applies to you? Yes  I have been here within the last 3 years, and my medical history has NOT changed since that time  I am FEMALE/of child-bearing potential     REVIEW OF SYSTEMS:  Have you recently had or currently have any of the following? · No changes in my recent health  PAST MEDICAL HISTORY:  Have you personally ever had or currently have any of the following? If \"YES,\" then please provide more detail  · No changes in my medical history  FAMILY HISTORY:  Any \"first degree relatives\" (parent, brother, sister, or child) with the following? • No changes in my family's known health  PATIENT EXPERIENCE:    • Do you want the Dermatologist to perform a COMPLETE skin exam today including a clinical examination under the \"bra and underwear\" areas? Yes  • If necessary, do we have your permission to call and leave a detailed message on your Preferred Phone number that includes your specific medical information?   Yes      Allergies   Allergen Reactions   • Dust Mite Extract    • Pollen Extract       Current Outpatient Medications:   •  alendronate (Fosamax) 70 mg tablet, Take 1 tablet (70 mg total) by mouth every 7 days, Disp: 12 tablet, Rfl: 3  •  amoxicillin (AMOXIL) 500 MG tablet, Take 500 mg by mouth 3 (three) times a day Takes whenever she has dental work  07/12/22, Disp: , Rfl:   •  aspirin 81 MG tablet, Take by mouth, Disp: , Rfl:   •  atorvastatin (LIPITOR) 20 mg tablet, TAKE 1 TABLET BY MOUTH EVERY DAY, Disp: 90 tablet, Rfl: 0  •  BIOTIN PO, Take by mouth, Disp: , Rfl:   •  Calcium Carbonate-Vitamin D 600-200 MG-UNIT TABS, Take by mouth, Disp: , Rfl:   •  multivitamin (THERAGRAN) TABS, Take by mouth, Disp: , Rfl:   •  PARoxetine (PAXIL) 10 mg tablet, TAKE 1 TABLET BY MOUTH EVERY DAY, Disp: 90 " tablet, Rfl: 0  •  SUMAtriptan (IMITREX) 100 mg tablet, 1 tablet as needed for headache, Disp: 10 tablet, Rfl: 0  •  traZODone (DESYREL) 50 mg tablet, Take 1 tablet (50 mg total) by mouth daily at bedtime, Disp: 90 tablet, Rfl: 3          • Whom besides the patient is providing clinical information about today's encounter?   o NO ADDITIONAL HISTORIAN (patient alone provided history)    Physical Exam and Assessment/Plan by Diagnosis:

## 2023-03-30 NOTE — PATIENT INSTRUCTIONS
"Actinic Keratosis:  Patient advised lesions are precancers  These should resolve with cryosurgery if not to let us know sun block recommended  Seborrheic keratosis patient reassured these are normal growths we acquire with age no treatment needed  History of skin cancer in no recurrence nothing else atypical sunblock recommended follow-up in 1 year  Screening for dermatologic disorders nothing else of concern noted on complete exam follow-up in 1 year  Treatment with Cryotherapy    The doctor has treated your skin with nitrogen, which is 320 degrees Fahrenheit below zero  He has given the treated area \"frostbite  \"    Stinging should subside within a few hours  You can take Tylenol for pain, if needed  Over the next few days, it is normal if the area becomes reddened, a blood blister, or swollen with fluid  If the lesion treated was near the eye - you could get a swollen eye over the next few days  Do not panic! This is all temporary, and will resolve with time  There is no special treatment - just keep the area clean  Makeup and BandAids can be used, if preferred  When the area starts to dry up and peel off, using Vaseline can help healing  It usually takes up to a month for it to heal   Some lesions are recurrent and may require repeat treatments  If a lesion has not healed in one month, please don't hesitate to contact us  If you have any further questions that are not answered here, please call us  [phone]    Thank you for allowing us to care for you      "

## 2023-04-24 ENCOUNTER — TELEPHONE (OUTPATIENT)
Age: 73
End: 2023-04-24

## 2023-04-24 DIAGNOSIS — U07.1 COVID: Primary | ICD-10-CM

## 2023-04-24 RX ORDER — NIRMATRELVIR AND RITONAVIR 150-100 MG
2 KIT ORAL 2 TIMES DAILY
Qty: 20 TABLET | Refills: 0 | Status: SHIPPED | OUTPATIENT
Start: 2023-04-24 | End: 2023-04-29

## 2023-04-24 NOTE — TELEPHONE ENCOUNTER
Positive for COVID today- home test    Brain fog; got it from her   Tired, joint pain , sore throat and headache    Requesting paxlovid

## 2023-05-09 DIAGNOSIS — E78.2 MIXED HYPERLIPIDEMIA: ICD-10-CM

## 2023-05-09 RX ORDER — ATORVASTATIN CALCIUM 20 MG/1
TABLET, FILM COATED ORAL
Qty: 90 TABLET | Refills: 0 | Status: SHIPPED | OUTPATIENT
Start: 2023-05-09

## 2023-05-22 ENCOUNTER — TELEPHONE (OUTPATIENT)
Dept: HEMATOLOGY ONCOLOGY | Facility: CLINIC | Age: 73
End: 2023-05-22

## 2023-05-22 DIAGNOSIS — D47.2 MGUS (MONOCLONAL GAMMOPATHY OF UNKNOWN SIGNIFICANCE): Primary | ICD-10-CM

## 2023-05-22 NOTE — TELEPHONE ENCOUNTER
Voicemail was left for patient advising to return call and reschedule  No labs ordered by previous [facility] is requesting labs be completed prior to follow up     Next available is ok'd   Labs placed   [facility] number left for reschedule

## 2023-06-12 ENCOUNTER — RA CDI HCC (OUTPATIENT)
Dept: OTHER | Facility: HOSPITAL | Age: 73
End: 2023-06-12

## 2023-06-13 ENCOUNTER — OFFICE VISIT (OUTPATIENT)
Age: 73
End: 2023-06-13
Payer: COMMERCIAL

## 2023-06-13 ENCOUNTER — APPOINTMENT (OUTPATIENT)
Age: 73
End: 2023-06-13
Payer: COMMERCIAL

## 2023-06-13 VITALS
WEIGHT: 100.4 LBS | SYSTOLIC BLOOD PRESSURE: 112 MMHG | OXYGEN SATURATION: 95 % | RESPIRATION RATE: 17 BRPM | TEMPERATURE: 97.3 F | HEART RATE: 71 BPM | DIASTOLIC BLOOD PRESSURE: 66 MMHG | BODY MASS INDEX: 19.71 KG/M2 | HEIGHT: 60 IN

## 2023-06-13 DIAGNOSIS — E78.2 MIXED HYPERLIPIDEMIA: ICD-10-CM

## 2023-06-13 DIAGNOSIS — Z00.00 MEDICARE ANNUAL WELLNESS VISIT, SUBSEQUENT: Primary | ICD-10-CM

## 2023-06-13 LAB
ALBUMIN SERPL BCP-MCNC: 3.7 G/DL (ref 3.5–5)
ALP SERPL-CCNC: 61 U/L (ref 46–116)
ALT SERPL W P-5'-P-CCNC: 98 U/L (ref 12–78)
AMORPH URATE CRY URNS QL MICRO: ABNORMAL
ANION GAP SERPL CALCULATED.3IONS-SCNC: -1 MMOL/L (ref 4–13)
AST SERPL W P-5'-P-CCNC: 114 U/L (ref 5–45)
BACTERIA UR QL AUTO: ABNORMAL /HPF
BASOPHILS # BLD AUTO: 0.03 THOUSANDS/ÂΜL (ref 0–0.1)
BASOPHILS NFR BLD AUTO: 1 % (ref 0–1)
BILIRUB SERPL-MCNC: 0.57 MG/DL (ref 0.2–1)
BILIRUB UR QL STRIP: NEGATIVE
BUN SERPL-MCNC: 14 MG/DL (ref 5–25)
CALCIUM SERPL-MCNC: 9.9 MG/DL (ref 8.3–10.1)
CAOX CRY URNS QL MICRO: ABNORMAL /HPF
CHLORIDE SERPL-SCNC: 107 MMOL/L (ref 96–108)
CHOLEST SERPL-MCNC: 181 MG/DL
CLARITY UR: ABNORMAL
CO2 SERPL-SCNC: 34 MMOL/L (ref 21–32)
COLOR UR: YELLOW
CREAT SERPL-MCNC: 0.9 MG/DL (ref 0.6–1.3)
EOSINOPHIL # BLD AUTO: 0.1 THOUSAND/ÂΜL (ref 0–0.61)
EOSINOPHIL NFR BLD AUTO: 2 % (ref 0–6)
ERYTHROCYTE [DISTWIDTH] IN BLOOD BY AUTOMATED COUNT: 12.1 % (ref 11.6–15.1)
GFR SERPL CREATININE-BSD FRML MDRD: 64 ML/MIN/1.73SQ M
GLUCOSE P FAST SERPL-MCNC: 75 MG/DL (ref 65–99)
GLUCOSE UR STRIP-MCNC: NEGATIVE MG/DL
HCT VFR BLD AUTO: 42.1 % (ref 34.8–46.1)
HDLC SERPL-MCNC: 100 MG/DL
HGB BLD-MCNC: 13.5 G/DL (ref 11.5–15.4)
HGB UR QL STRIP.AUTO: NEGATIVE
IMM GRANULOCYTES # BLD AUTO: 0.01 THOUSAND/UL (ref 0–0.2)
IMM GRANULOCYTES NFR BLD AUTO: 0 % (ref 0–2)
KETONES UR STRIP-MCNC: NEGATIVE MG/DL
LDLC SERPL CALC-MCNC: 70 MG/DL (ref 0–100)
LEUKOCYTE ESTERASE UR QL STRIP: NEGATIVE
LYMPHOCYTES # BLD AUTO: 1.44 THOUSANDS/ÂΜL (ref 0.6–4.47)
LYMPHOCYTES NFR BLD AUTO: 34 % (ref 14–44)
MCH RBC QN AUTO: 31.2 PG (ref 26.8–34.3)
MCHC RBC AUTO-ENTMCNC: 32.1 G/DL (ref 31.4–37.4)
MCV RBC AUTO: 97 FL (ref 82–98)
MONOCYTES # BLD AUTO: 0.36 THOUSAND/ÂΜL (ref 0.17–1.22)
MONOCYTES NFR BLD AUTO: 9 % (ref 4–12)
NEUTROPHILS # BLD AUTO: 2.3 THOUSANDS/ÂΜL (ref 1.85–7.62)
NEUTS SEG NFR BLD AUTO: 54 % (ref 43–75)
NITRITE UR QL STRIP: NEGATIVE
NON-SQ EPI CELLS URNS QL MICRO: ABNORMAL /HPF
NRBC BLD AUTO-RTO: 0 /100 WBCS
PH UR STRIP.AUTO: 7.5 [PH]
PLATELET # BLD AUTO: 258 THOUSANDS/UL (ref 149–390)
PMV BLD AUTO: 10.2 FL (ref 8.9–12.7)
POTASSIUM SERPL-SCNC: 4.5 MMOL/L (ref 3.5–5.3)
PROT SERPL-MCNC: 7.2 G/DL (ref 6.4–8.4)
PROT UR STRIP-MCNC: ABNORMAL MG/DL
RBC # BLD AUTO: 4.33 MILLION/UL (ref 3.81–5.12)
RBC #/AREA URNS AUTO: ABNORMAL /HPF
SODIUM SERPL-SCNC: 140 MMOL/L (ref 135–147)
SP GR UR STRIP.AUTO: 1.01 (ref 1–1.03)
TRIGL SERPL-MCNC: 55 MG/DL
TSH SERPL DL<=0.05 MIU/L-ACNC: 0.8 UIU/ML (ref 0.45–4.5)
UROBILINOGEN UR STRIP-ACNC: <2 MG/DL
WBC # BLD AUTO: 4.24 THOUSAND/UL (ref 4.31–10.16)
WBC #/AREA URNS AUTO: ABNORMAL /HPF

## 2023-06-13 PROCEDURE — 80061 LIPID PANEL: CPT

## 2023-06-13 PROCEDURE — 36415 COLL VENOUS BLD VENIPUNCTURE: CPT

## 2023-06-13 PROCEDURE — 80053 COMPREHEN METABOLIC PANEL: CPT

## 2023-06-13 PROCEDURE — 84443 ASSAY THYROID STIM HORMONE: CPT

## 2023-06-13 PROCEDURE — 99213 OFFICE O/P EST LOW 20 MIN: CPT | Performed by: INTERNAL MEDICINE

## 2023-06-13 PROCEDURE — G0438 PPPS, INITIAL VISIT: HCPCS | Performed by: INTERNAL MEDICINE

## 2023-06-13 PROCEDURE — 81001 URINALYSIS AUTO W/SCOPE: CPT | Performed by: INTERNAL MEDICINE

## 2023-06-13 PROCEDURE — 85025 COMPLETE CBC W/AUTO DIFF WBC: CPT

## 2023-06-13 NOTE — PROGRESS NOTES
[facility] 75  coding opportunities       Chart reviewed, no opportunity found: CHART REVIEWED, [phone]     Patients Insurance     Medicare Insurance: [employer]

## 2023-06-13 NOTE — PATIENT INSTRUCTIONS
"Chronic diagnoses but nothing acute  Doing quite well  \"Routine \"laboratory testing  Continue to follow with heme-onc  Next IN visit in a year  Medicare Preventive Visit Patient Instructions  Thank you for completing your Welcome to Medicare Visit or Medicare Annual Wellness Visit today  Your next wellness visit will be due in one year (6/13/2024)  The screening/preventive services that you may require over the next 5-10 years are detailed below  Some tests may not apply to you based off risk factors and/or age  Screening tests ordered at today's visit but not completed yet may show as past due  Also, please note that scanned in results may not display below  Preventive Screenings:  Service Recommendations Previous Testing/Comments   Colorectal Cancer Screening  * Colonoscopy    * Fecal Occult Blood Test (FOBT)/Fecal Immunochemical Test (FIT)  * Fecal DNA/Cologuard Test  * Flexible Sigmoidoscopy Age: 39-70 years old   Colonoscopy: every 10 years (may be performed more frequently if at higher risk)  OR  FOBT/FIT: every 1 year  OR  Cologuard: every 3 years  OR  Sigmoidoscopy: every 5 years  Screening may be recommended earlier than age 39 if at higher risk for colorectal cancer  Also, an individualized decision between you and your healthcare provider will decide whether screening between the ages of 74-80 would be appropriate  Colonoscopy: 08/25/2022  FOBT/FIT: Not on file  Cologuard: Not on file  Sigmoidoscopy: Not on file    Screening Current     Breast Cancer Screening Age: 36 years old  Frequency: every 1-2 years  Not required if history of left and right mastectomy Mammogram: 08/24/2022    Screening Current   Cervical Cancer Screening Between the ages of 21-29, pap smear recommended once every 3 years  Between the ages of 33-67, can perform pap smear with HPV co-testing every 5 years     Recommendations may differ for women with a history of total hysterectomy, cervical cancer, or abnormal pap smears in " past  Pap Smear: Not on file    Screening Not Indicated   Hepatitis C Screening Once for adults born between 1945 and 1965  More frequently in patients at high risk for Hepatitis C Hep C Antibody: 07/07/2017    Screening Current   Diabetes Screening 1-2 times per year if you're at risk for diabetes or have pre-diabetes Fasting glucose: 93 mg/dL (8/22/2022)  A1C: No results in last 5 years (No results in last 5 years)  Screening Current   Cholesterol Screening Once every 5 years if you don't have a lipid disorder  May order more often based on risk factors  Lipid panel: 08/22/2022    Screening Not Indicated  History Lipid Disorder     Other Preventive Screenings Covered by Medicare:  Abdominal Aortic Aneurysm (AAA) Screening: covered once if your at risk  You're considered to be at risk if you have a family history of AAA  Lung Cancer Screening: covers low dose CT scan once per year if you meet all of the following conditions: (1) Age 50-69; (2) No signs or symptoms of lung cancer; (3) Current smoker or have quit smoking within the last 15 years; (4) You have a tobacco smoking history of at least 20 pack years (packs per day multiplied by number of years you smoked); (5) You get a written order from a healthcare provider  Glaucoma Screening: covered annually if you're considered high risk: (1) You have diabetes OR (2) Family history of glaucoma OR (3)  aged 48 and older OR (3)  American aged 72 and older  Osteoporosis Screening: covered every 2 years if you meet one of the following conditions: (1) You're estrogen deficient and at risk for osteoporosis based off medical history and other findings; (2) Have a vertebral abnormality; (3) On glucocorticoid therapy for more than 3 months; (4) Have primary hyperparathyroidism; (5) On osteoporosis medications and need to assess response to drug therapy  Last bone density test (DXA Scan): 10/18/2021    HIV Screening: covered annually if you're between the age of 12-76  Also covered annually if you are younger than 13 and older than 72 with risk factors for HIV infection  For pregnant patients, it is covered up to 3 times per pregnancy  Immunizations:  Immunization Recommendations   Influenza Vaccine Annual influenza vaccination during flu season is recommended for all persons aged >= 6 months who do not have contraindications   Pneumococcal Vaccine   * Pneumococcal conjugate vaccine = PCV13 (Prevnar 13), PCV15 (Vaxneuvance), PCV20 (Prevnar 20)  * Pneumococcal polysaccharide vaccine = PPSV23 (Pneumovax) Adults 25-60 years old: 1-3 doses may be recommended based on certain risk factors  Adults 72 years old: 1-2 doses may be recommended based off what pneumonia vaccine you previously received   Hepatitis B Vaccine 3 dose series if at intermediate or high risk (ex: diabetes, end stage renal disease, liver disease)   Tetanus (Td) Vaccine - COST NOT COVERED BY MEDICARE PART B Following completion of primary series, a booster dose should be given every 10 years to maintain immunity against tetanus  Td may also be given as tetanus wound prophylaxis  Tdap Vaccine - COST NOT COVERED BY MEDICARE PART B Recommended at least once for all adults  For pregnant patients, recommended with each pregnancy  Shingles Vaccine (Shingrix) - COST NOT COVERED BY MEDICARE PART B  2 shot series recommended in those aged 48 and above     Health Maintenance Due:      Topic Date Due    DXA SCAN  10/18/2022    Breast Cancer Screening: Mammogram  08/24/2023    Colorectal Cancer Screening  08/24/2027    Hepatitis C Screening  Completed     Immunizations Due:      Topic Date Due    COVID-19 Vaccine (4 - Booster for Pilar series) 06/12/2022    Influenza Vaccine (Season Ended) 09/01/2023     Advance Directives   What are advance directives? Advance directives are legal documents that state your wishes and plans for medical care   These plans are made ahead of time in case you lose your ability to make decisions for yourself  Advance directives can apply to any medical decision, such as the treatments you want, and if you want to donate organs  What are the types of advance directives? There are many types of advance directives, and each state has rules about how to use them  You may choose a combination of any of the following:  Living will: This is a written record of the treatment you want  You can also choose which treatments you do not want, which to limit, and which to stop at a certain time  This includes surgery, medicine, IV fluid, and tube feedings  Durable power of  for healthcare [facility]): This is a written record that states who you want to make healthcare choices for you when you are unable to make them for yourself  This person, called a proxy, is usually a family member or a friend  You may choose more than 1 proxy  Do not resuscitate (DNR) order:  A DNR order is used in case your heart stops beating or you stop breathing  It is a request not to have certain forms of treatment, such as CPR  A DNR order may be included in other types of advance directives  Medical directive: This covers the care that you want if you are in a coma, near death, or unable to make decisions for yourself  You can list the treatments you want for each condition  Treatment may include pain medicine, surgery, blood transfusions, dialysis, IV or tube feedings, and a ventilator (breathing machine)  Values history: This document has questions about your views, beliefs, and how you feel and think about life  This information can help others choose the care that you would choose  Why are advance directives important? An advance directive helps you control your care  Although spoken wishes may be used, it is better to have your wishes written down  Spoken wishes can be misunderstood, or not followed  Treatments may be given even if you do not want them   An advance directive may make it easier for your family to make difficult choices about your care  Fall Prevention    Fall prevention  includes ways to make your home and other areas safer  It also includes ways you can move more carefully to prevent a fall  Health conditions that cause changes in your blood pressure, vision, or muscle strength and coordination may increase your risk for falls  Medicines may also increase your risk for falls if they make you dizzy, weak, or sleepy  Fall prevention tips:   Stand or sit up slowly  Use assistive devices as directed  Wear shoes that fit well and have soles that   Wear a personal alarm  Stay active  Manage your medical conditions  Home Safety Tips:  Add items to prevent falls in the bathroom  Keep paths clear  Install bright lights in your home  Keep items you use often on shelves within reach  [place] or place reflective tape on the edges of your stairs  © Copyright [employer] 2018 Information is for End User's use only and may not be sold, redistributed or otherwise used for commercial purposes   All illustrations and images included in CareNotes® are the copyrighted property of [employer]  or [address] [place] [address] "[employer]

## 2023-06-13 NOTE — PROGRESS NOTES
Assessment and Plan:     Problem List Items Addressed This Visit        Other    Hyperlipidemia    Relevant Orders    Lipid Panel with Direct LDL reflex    CBC and differential    Comprehensive metabolic panel    TSH, 3rd generation with Free T4 reflex    UA (URINE) with reflex to Scope   Other Visit Diagnoses     Medicare annual wellness visit, subsequent    -  Primary          Depression Screening and Follow-up Plan: Patient was screened for depression during today's encounter  They screened negative with a PHQ-2 score of 0  Preventive health issues were discussed with patient, and age appropriate screening tests were ordered as noted in patient's After Visit Summary  Personalized health advice and appropriate referrals for health education or preventive services given if needed, as noted in patient's After Visit Summary  History of Present Illness:     Patient presents for a Medicare Wellness Visit    79-year-old female  Monoclonal gammopathy of uncertain significance followed by Hematology  Steatosis without clinical sequelae  Hyperlipidemia; treated with Lipitor  History of migraine headaches; not very active  The patient uses some tripped and rarely but manages to stay ahead of this by keeping herself hydrated    Colonoscopies and mammograms are up-to-date  Colonoscopy done in August 2022 was clean  Due to prior polyp, the next colonoscopy should be in 2027              Patient Care Team:  Zara Mota MD as PCP - General  Afua Quinones MD     Review of Systems:     Review of Systems   Musculoskeletal: Positive for arthralgias  Neurological: Positive for headaches  All other systems reviewed and are negative         Problem List:     Patient Active Problem List   Diagnosis   • Hyperlipidemia   • MGUS (monoclonal gammopathy of unknown significance)   • Migraine   • Osteoporosis, post-menopausal   • Arthritis   • Abnormal transaminases   • Steatosis (HCC)   • Need for vaccination for pneumococcus   • Actinic keratosis   • Seborrheic keratosis   • Screening for skin condition   • History of skin cancer   • Dysuria   • Healthcare maintenance   • Anxiety   • Primary insomnia      Past Medical and Surgical History:     Past Medical History:   Diagnosis Date   • Colon polyp    • H/O nonmelanoma skin cancer     last assessed 8/16/17     Past Surgical History:   Procedure Laterality Date   • COLONOSCOPY      last assessed 12/31/14, next in 2016   • WISDOM TOOTH EXTRACTION  06/2022      Family History:     History reviewed  No pertinent family history  Social History:     Social History     Socioeconomic History   • Marital status: /Civil Union     Spouse name: None   • Number of children: None   • Years of education: None   • Highest education level: None   Occupational History   • None   Tobacco Use   • Smoking status: Never   • Smokeless tobacco: Never   Vaping Use   • Vaping Use: Never used   Substance and Sexual Activity   • Alcohol use: Yes     Alcohol/week: 2 0 standard drinks of alcohol     Types: 2 Glasses of wine per week     Comment: socially   • Drug use: No   • Sexual activity: Not Currently   Other Topics Concern   • None   Social History Narrative   • None     Social Determinants of Health     Financial Resource Strain: Medium Risk (6/13/2023)    Overall Financial Resource Strain (CARDIA)    • Difficulty of Paying Living Expenses: Somewhat hard   Food Insecurity: Not on file   Transportation Needs: No Transportation Needs (6/13/2023)    PRAPARE - Transportation    • Lack of Transportation (Medical): No    • Lack of Transportation (Non-Medical):  No   Physical Activity: Sufficiently Active (12/20/2021)    Exercise Vital Sign    • Days of Exercise per Week: 4 days    • Minutes of Exercise per Session: 40 min   Recent Concern: Physical Activity - Insufficiently Active (12/1/2021)    Exercise Vital Sign    • Days of Exercise per Week: 4 days    • Minutes of Exercise per Session: 30 min   Stress: No Stress Concern Present (6/13/2023)    [address]    • Feeling of Stress : Not at all   Social Connections: Not on file   Intimate Partner Violence: Not on file   Housing Stability: Not on file      Medications and Allergies:     Current Outpatient Medications   Medication Sig Dispense Refill   • alendronate (Fosamax) 70 mg tablet Take 1 tablet (70 mg total) by mouth every 7 days 12 tablet 3   • aspirin 81 MG tablet Take by mouth     • atorvastatin (LIPITOR) 20 mg tablet TAKE 1 TABLET BY MOUTH EVERY DAY 90 tablet 0   • BIOTIN PO Take by mouth     • Calcium Carbonate-Vitamin D 600-200 MG-UNIT TABS Take by mouth     • multivitamin (THERAGRAN) TABS Take by mouth     • PARoxetine (PAXIL) 10 mg tablet TAKE 1 TABLET BY MOUTH EVERY DAY 90 tablet 1   • SUMAtriptan (IMITREX) 50 mg tablet 1 (ONE) TABLET BY MOUTH DAILY, AS NEEDED, TAKE ONE TABLET AT HEADACHE ONSET     • traZODone (DESYREL) 50 mg tablet Take 1 tablet (50 mg total) by mouth daily at bedtime 90 tablet 3   • SUMAtriptan (IMITREX) 100 mg tablet 1 tablet as needed for headache (Patient not taking: Reported on 3/30/2023) 10 tablet 0     No current facility-administered medications for this visit       Allergies   Allergen Reactions   • Dust Mite Extract    • Pollen Extract       Immunizations:     Immunization History   Administered Date(s) Administered   • COVID-19 J&J (Pilar) vaccine 0 5 mL 03/13/2021   • COVID-19 PFIZER VACCINE 0 3 ML IM 10/26/2021, 04/17/2022   • INFLUENZA 10/03/2021   • Influenza, seasonal, injectable 10/03/2021   • Pneumococcal Conjugate 13-Valent 07/12/2018   • Pneumococcal Polysaccharide PPV23 04/03/2019   • Tdap 11/07/2022   • Zoster Vaccine Recombinant 09/20/2018, 03/19/2019      Health Maintenance:         Topic Date Due   • DXA SCAN  10/18/2022   • Breast Cancer Screening: Mammogram  08/24/2023   • Colorectal Cancer Screening  08/24/2027   • Hepatitis C Screening  Completed         Topic Date Due   • COVID-19 Vaccine (4 - Booster for Pilar series) 06/12/2022   • Influenza Vaccine (Season Ended) 09/01/2023      Medicare Screening Tests and Risk Assessments:     Soham Martinez is here for her Subsequent Wellness visit  Last Medicare Wellness visit information reviewed, patient interviewed and updates made to the record today  Health Risk Assessment:   Patient rates overall health as fair  Patient feels that their physical health rating is same  Patient is satisfied with their life  Eyesight was rated as same  Hearing was rated as slightly worse  Patient feels that their emotional and mental health rating is same  Patients states they are sometimes angry  Patient states they are sometimes unusually tired/fatigued  Pain experienced in the last 7 days has been none  Patient states that she has experienced no weight loss or gain in last 6 months  Depression Screening:   PHQ-2 Score: 0      Fall Risk Screening: In the past year, patient has experienced: history of falling in past year    Number of falls: 1  Injured during fall?: Yes    Feels unsteady when standing or walking?: No    Worried about falling?: No      Urinary Incontinence Screening:   Patient has not leaked urine accidently in the last six months  Home Safety:  Patient does not have trouble with stairs inside or outside of their home  Patient has working smoke alarms and has working carbon monoxide detector  Home safety hazards include: none  Nutrition:   Current diet is Regular  Medications:   Patient is currently taking over-the-counter supplements  OTC medications include: see medication list  Patient is able to manage medications  Activities of Daily Living (ADLs)/Instrumental Activities of Daily Living (IADLs):   Walk and transfer into and out of bed and chair?: Yes  Dress and groom yourself?: Yes    Bathe or shower yourself?: Yes    Feed yourself?  Yes  Do your laundry/housekeeping?: Yes  Manage your money, pay your bills and track your expenses?: Yes  Make your own meals?: Yes    Do your own shopping?: Yes    Previous Hospitalizations:   Any hospitalizations or ED visits within the last 12 months?: No      Advance Care Planning:   Living will: Yes    Advanced directive: Yes      PREVENTIVE SCREENINGS      Cardiovascular Screening:    General: Screening Not Indicated and History Lipid Disorder      Diabetes Screening:     General: Screening Current      Colorectal Cancer Screening:     General: Screening Current      Breast Cancer Screening:     General: Screening Current      Cervical Cancer Screening:    General: Screening Not Indicated      Osteoporosis Screening:    General: Screening Not Indicated and History Osteoporosis      Lung Cancer Screening:     General: Screening Not Indicated      Hepatitis C Screening:    General: Screening Current    Screening, Brief Intervention, and Referral to Treatment (SBIRT)    Screening  Typical number of drinks in a day: 0  Typical number of drinks in a week: 1  Interpretation: Low risk drinking behavior  AUDIT-C Screenin) How often did you have a drink containing alcohol in the past year? 2 to 4 times a month  2) How many drinks did you have on a typical day when you were drinking in the past year? 1 to 2  3) How often did you have 6 or more drinks on one occasion in the past year? never    AUDIT-C Score: 2  Interpretation: Score 0-2 (female): Negative screen for alcohol misuse    Single Item Drug Screening:  How often have you used an illegal drug (including marijuana) or a prescription medication for non-medical reasons in the past year? never    Single Item Drug Screen Score: 0  Interpretation: Negative screen for possible drug use disorder    No results found       Physical Exam:     /66 (BP Location: Left arm, Patient Position: Sitting, Cuff Size: Standard)   Pulse 71   Temp (!) 97 3 °F (36 3 °C)   Resp 17 Ht 5' (1 524 m)   Wt 45 5 kg (100 lb 6 4 oz)   SpO2 95%   BMI 19 61 kg/m²     Physical Exam  Vitals and nursing note reviewed  Constitutional:       General: She is not in acute distress  Appearance: She is well-developed  HENT:      Head: Normocephalic and atraumatic  Eyes:      General: No scleral icterus  Conjunctiva/sclera: Conjunctivae normal    Cardiovascular:      Rate and Rhythm: Normal rate and regular rhythm  Heart sounds: No murmur heard  Pulmonary:      Effort: Pulmonary effort is normal  No respiratory distress  Breath sounds: Normal breath sounds  Abdominal:      General: Abdomen is flat  Palpations: Abdomen is soft  Tenderness: There is no abdominal tenderness  Musculoskeletal:         General: No swelling  Cervical back: Neck supple  Skin:     General: Skin is warm and dry  Capillary Refill: Capillary refill takes less than 2 seconds  Neurological:      Mental Status: She is alert     Psychiatric:         Mood and Affect: Mood normal           Donald Amato MD

## 2023-06-28 ENCOUNTER — TELEPHONE (OUTPATIENT)
Dept: HEMATOLOGY ONCOLOGY | Facility: CLINIC | Age: 73
End: 2023-06-28

## 2023-06-28 NOTE — TELEPHONE ENCOUNTER
I called [facility] regarding an appointment that they have scheduled with Peggy Ratliff scheduled on 08/03/23     I left a voicemail explaining to patient that the provider will be out of office on this date  Outreach is being made to reschedule patient  I encouraged patient to call [facility] at [phone] to reschedule  A nPickert message (if applicable) has been sent to patient relaying the above information and advising patient to call [facility] and reschedule their appointment

## 2023-07-03 ENCOUNTER — TELEPHONE (OUTPATIENT)
Dept: HEMATOLOGY ONCOLOGY | Facility: CLINIC | Age: 73
End: 2023-07-03

## 2023-07-03 NOTE — TELEPHONE ENCOUNTER
Appointment Change  Cancel, Reschedule, Change to Virtual      Who are you speaking with? Patient   If it is not the patient, are they listed on an active communication consent form? N/A   Which provider is the appointment scheduled with? Dasha Gray PA-C   When is the appointment scheduled? Please list date and time  08/03/23 3PM   At which location is the appointment scheduled to take place? [address]   Was the appointment rescheduled or changed from an in person visit to a virtual visit? If so, please list the details of the change. 08/21/23 1:30PM   What is the reason for the appointment change? Provider out of office   Was STAR transport scheduled for this visit? No   Does STAR transport need to be scheduled for the new visit (if applicable) N/A   Does the patient need an infusion appointment rescheduled? No   Does the patient have an infusion appointment scheduled? If so, when? No   Is the patient undergoing chemotherapy? No   Was the no-show policy reviewed for appointments being changed with less then 24 hours of notice?  N/A

## 2023-07-26 DIAGNOSIS — M81.0 OSTEOPOROSIS, POST-MENOPAUSAL: ICD-10-CM

## 2023-07-26 RX ORDER — ALENDRONATE SODIUM 70 MG/1
70 TABLET ORAL WEEKLY
Qty: 12 TABLET | Refills: 2 | Status: SHIPPED | OUTPATIENT
Start: 2023-07-26

## 2023-08-06 DIAGNOSIS — E78.2 MIXED HYPERLIPIDEMIA: ICD-10-CM

## 2023-08-07 RX ORDER — ATORVASTATIN CALCIUM 20 MG/1
TABLET, FILM COATED ORAL
Qty: 90 TABLET | Refills: 0 | Status: SHIPPED | OUTPATIENT
Start: 2023-08-07

## 2023-08-16 ENCOUNTER — TELEPHONE (OUTPATIENT)
Dept: HEMATOLOGY ONCOLOGY | Facility: CLINIC | Age: 73
End: 2023-08-16

## 2023-08-16 NOTE — TELEPHONE ENCOUNTER
Left message reminding patient of the appointment and to get her labs done along with callback number

## 2023-08-18 ENCOUNTER — TELEPHONE (OUTPATIENT)
Dept: HEMATOLOGY ONCOLOGY | Facility: CLINIC | Age: 73
End: 2023-08-18

## 2023-08-18 DIAGNOSIS — D47.2 MGUS (MONOCLONAL GAMMOPATHY OF UNKNOWN SIGNIFICANCE): Primary | ICD-10-CM

## 2023-08-18 NOTE — TELEPHONE ENCOUNTER
Patient called to inform they had not had labs done, they were aware they took long to process and rescheduled out about 2-3 weeks as they were not feeling 100 % and would go next week for the labs

## 2023-09-01 ENCOUNTER — TELEPHONE (OUTPATIENT)
Dept: HEMATOLOGY ONCOLOGY | Facility: CLINIC | Age: 73
End: 2023-09-01

## 2023-09-01 NOTE — TELEPHONE ENCOUNTER
Appointment Change  Cancel, Reschedule, Change to Virtual      Who are you speaking with? Patient   If it is not the patient, is the caller listed on the communication consent form? N/A   Which provider is the appointment scheduled with? Dr. Jimmy Larios   When was the original appointment scheduled? Please list date and time 10/9/23 9:00AM   At which location is the appointment scheduled to take place? [place]   Was the appointment rescheduled? Was the appointment changed from an in person visit to a virtual visit? If so, please list the details of the change. 10/10/23 2:40PM   What is the reason for the appointment change? Provider       Was STAR transport scheduled? No   Does STAR transport need to be scheduled for the new visit (if applicable) No   Does the patient need an infusion appointment rescheduled? No   Does the patient have an upcoming infusion appointment scheduled? If so, when? No   Is the patient undergoing chemotherapy? No   For appointments cancelled with less than 24 hours:  Was the no-show policy reviewed?  No

## 2023-09-29 ENCOUNTER — APPOINTMENT (OUTPATIENT)
Age: 73
End: 2023-09-29
Payer: COMMERCIAL

## 2023-09-29 DIAGNOSIS — D47.2 MGUS (MONOCLONAL GAMMOPATHY OF UNKNOWN SIGNIFICANCE): ICD-10-CM

## 2023-09-29 LAB
ALBUMIN SERPL BCP-MCNC: 4 G/DL (ref 3.5–5)
ALP SERPL-CCNC: 54 U/L (ref 34–104)
ALT SERPL W P-5'-P-CCNC: 48 U/L (ref 7–52)
ANION GAP SERPL CALCULATED.3IONS-SCNC: 8 MMOL/L
AST SERPL W P-5'-P-CCNC: 72 U/L (ref 13–39)
BASOPHILS # BLD AUTO: 0.04 THOUSANDS/ÂΜL (ref 0–0.1)
BASOPHILS NFR BLD AUTO: 1 % (ref 0–1)
BILIRUB SERPL-MCNC: 0.53 MG/DL (ref 0.2–1)
BUN SERPL-MCNC: 18 MG/DL (ref 5–25)
CALCIUM SERPL-MCNC: 9.4 MG/DL (ref 8.4–10.2)
CHLORIDE SERPL-SCNC: 101 MMOL/L (ref 96–108)
CO2 SERPL-SCNC: 32 MMOL/L (ref 21–32)
CREAT SERPL-MCNC: 0.8 MG/DL (ref 0.6–1.3)
EOSINOPHIL # BLD AUTO: 0.1 THOUSAND/ÂΜL (ref 0–0.61)
EOSINOPHIL NFR BLD AUTO: 2 % (ref 0–6)
ERYTHROCYTE [DISTWIDTH] IN BLOOD BY AUTOMATED COUNT: 11.8 % (ref 11.6–15.1)
GFR SERPL CREATININE-BSD FRML MDRD: 74 ML/MIN/1.73SQ M
GLUCOSE P FAST SERPL-MCNC: 83 MG/DL (ref 65–99)
HCT VFR BLD AUTO: 40.3 % (ref 34.8–46.1)
HGB BLD-MCNC: 13.5 G/DL (ref 11.5–15.4)
IMM GRANULOCYTES # BLD AUTO: 0 THOUSAND/UL (ref 0–0.2)
IMM GRANULOCYTES NFR BLD AUTO: 0 % (ref 0–2)
LYMPHOCYTES # BLD AUTO: 1.43 THOUSANDS/ÂΜL (ref 0.6–4.47)
LYMPHOCYTES NFR BLD AUTO: 32 % (ref 14–44)
MCH RBC QN AUTO: 32 PG (ref 26.8–34.3)
MCHC RBC AUTO-ENTMCNC: 33.5 G/DL (ref 31.4–37.4)
MCV RBC AUTO: 96 FL (ref 82–98)
MONOCYTES # BLD AUTO: 0.46 THOUSAND/ÂΜL (ref 0.17–1.22)
MONOCYTES NFR BLD AUTO: 10 % (ref 4–12)
NEUTROPHILS # BLD AUTO: 2.42 THOUSANDS/ÂΜL (ref 1.85–7.62)
NEUTS SEG NFR BLD AUTO: 55 % (ref 43–75)
NRBC BLD AUTO-RTO: 0 /100 WBCS
PLATELET # BLD AUTO: 279 THOUSANDS/UL (ref 149–390)
PMV BLD AUTO: 10.4 FL (ref 8.9–12.7)
POTASSIUM SERPL-SCNC: 4.3 MMOL/L (ref 3.5–5.3)
PROT SERPL-MCNC: 7 G/DL (ref 6.4–8.4)
RBC # BLD AUTO: 4.22 MILLION/UL (ref 3.81–5.12)
SODIUM SERPL-SCNC: 141 MMOL/L (ref 135–147)
WBC # BLD AUTO: 4.45 THOUSAND/UL (ref 4.31–10.16)

## 2023-09-29 PROCEDURE — 84165 PROTEIN E-PHORESIS SERUM: CPT

## 2023-09-29 PROCEDURE — 86334 IMMUNOFIX E-PHORESIS SERUM: CPT

## 2023-09-29 PROCEDURE — 85025 COMPLETE CBC W/AUTO DIFF WBC: CPT

## 2023-09-29 PROCEDURE — 80053 COMPREHEN METABOLIC PANEL: CPT

## 2023-09-29 PROCEDURE — 83521 IG LIGHT CHAINS FREE EACH: CPT

## 2023-09-29 PROCEDURE — 36415 COLL VENOUS BLD VENIPUNCTURE: CPT

## 2023-09-30 LAB
KAPPA LC FREE SER-MCNC: 16.2 MG/L (ref 3.3–19.4)
KAPPA LC FREE/LAMBDA FREE SER: 1.25 {RATIO} (ref 0.26–1.65)
LAMBDA LC FREE SERPL-MCNC: 13 MG/L (ref 5.7–26.3)

## 2023-10-02 LAB
ALBUMIN SERPL ELPH-MCNC: 4.08 G/DL (ref 3.2–5.1)
ALBUMIN SERPL ELPH-MCNC: 60 % (ref 48–70)
ALBUMIN UR ELPH-MCNC: 100 %
ALPHA1 GLOB MFR UR ELPH: 0 %
ALPHA1 GLOB SERPL ELPH-MCNC: 0.27 G/DL (ref 0.15–0.47)
ALPHA1 GLOB SERPL ELPH-MCNC: 4 % (ref 1.8–7)
ALPHA2 GLOB MFR UR ELPH: 0 %
ALPHA2 GLOB SERPL ELPH-MCNC: 0.6 G/DL (ref 0.42–1.04)
ALPHA2 GLOB SERPL ELPH-MCNC: 8.8 % (ref 5.9–14.9)
B-GLOBULIN MFR UR ELPH: 0 %
BETA GLOB ABNORMAL SERPL ELPH-MCNC: 0.4 G/DL (ref 0.31–0.57)
BETA1 GLOB SERPL ELPH-MCNC: 5.9 % (ref 4.7–7.7)
BETA2 GLOB SERPL ELPH-MCNC: 6 % (ref 3.1–7.9)
BETA2+GAMMA GLOB SERPL ELPH-MCNC: 0.41 G/DL (ref 0.2–0.58)
GAMMA GLOB ABNORMAL SERPL ELPH-MCNC: 1.04 G/DL (ref 0.4–1.66)
GAMMA GLOB MFR UR ELPH: 0 %
GAMMA GLOB SERPL ELPH-MCNC: 15.3 % (ref 6.9–22.3)
IGG/ALB SER: 1.5 {RATIO} (ref 1.1–1.8)
INTERPRETATION UR IFE-IMP: NORMAL
M PROTEIN 1 MFR SERPL ELPH: 2.1 %
M PROTEIN 1 SERPL ELPH-MCNC: 0.14 G/DL
PROT PATTERN SERPL ELPH-IMP: NORMAL
PROT PATTERN UR ELPH-IMP: NORMAL
PROT SERPL-MCNC: 6.8 G/DL (ref 6.4–8.4)
PROT UR-MCNC: 4 MG/DL

## 2023-10-02 PROCEDURE — 84166 PROTEIN E-PHORESIS/URINE/CSF: CPT | Performed by: PATHOLOGY

## 2023-10-02 PROCEDURE — 86334 IMMUNOFIX E-PHORESIS SERUM: CPT | Performed by: PATHOLOGY

## 2023-10-02 PROCEDURE — 84165 PROTEIN E-PHORESIS SERUM: CPT | Performed by: PATHOLOGY

## 2023-10-10 ENCOUNTER — OFFICE VISIT (OUTPATIENT)
Dept: HEMATOLOGY ONCOLOGY | Facility: CLINIC | Age: 73
End: 2023-10-10
Payer: COMMERCIAL

## 2023-10-10 VITALS
HEART RATE: 68 BPM | TEMPERATURE: 98 F | WEIGHT: 102 LBS | HEIGHT: 60 IN | BODY MASS INDEX: 20.03 KG/M2 | RESPIRATION RATE: 16 BRPM | SYSTOLIC BLOOD PRESSURE: 126 MMHG | DIASTOLIC BLOOD PRESSURE: 86 MMHG | OXYGEN SATURATION: 98 %

## 2023-10-10 DIAGNOSIS — D47.2 MGUS (MONOCLONAL GAMMOPATHY OF UNKNOWN SIGNIFICANCE): Primary | ICD-10-CM

## 2023-10-10 PROCEDURE — 99212 OFFICE O/P EST SF 10 MIN: CPT | Performed by: INTERNAL MEDICINE

## 2023-10-10 NOTE — PROGRESS NOTES
[address] [facility] [place] [facility] [address] [facility]  1950      PRIMARY HEMATOLOGIC/ONCOLOGIC DIAGNOSIS:  MGUS, IgG-kappa (0.14 g/dL)     INTERIM HISTORY:  The patient presents for a follow-up. She has no new complaints. PAST MEDICAL,PAST SURGICAL, FAMILY AND SOCIAL HISTORY:    Patient Active Problem List   Diagnosis    Hyperlipidemia    MGUS (monoclonal gammopathy of unknown significance)    Migraine    Osteoporosis, post-menopausal    Arthritis    Abnormal transaminases    Steatosis (HCC)    Need for vaccination for pneumococcus    Actinic keratosis    Seborrheic keratosis    Screening for skin condition    History of skin cancer    Dysuria    Healthcare maintenance    Anxiety    Primary insomnia     Past Medical History:   Diagnosis Date    Colon polyp     H/O nonmelanoma skin cancer     last assessed 8/16/17     Past Surgical History:   Procedure Laterality Date    COLONOSCOPY      last assessed 12/31/14, next in 2016    WISDOM TOOTH EXTRACTION  06/2022     No family history on file. Social History     Socioeconomic History    Marital status: /Civil Union     Spouse name: Not on file    Number of children: Not on file    Years of education: Not on file    Highest education level: Not on file   Occupational History    Not on file   Tobacco Use    Smoking status: Never    Smokeless tobacco: Never   Vaping Use    Vaping Use: Never used   Substance and Sexual Activity    Alcohol use:  Yes     Alcohol/week: 2.0 standard drinks of alcohol     Types: 2 Glasses of wine per week     Comment: socially    Drug use: No    Sexual activity: Not Currently   Other Topics Concern    Not on file   Social History Narrative    Not on file     Social Determinants of Health     Financial Resource Strain: Medium Risk (6/13/2023)    Overall Financial Resource Strain (CARDIA)     Difficulty of Paying Living Expenses: Somewhat hard   Food Insecurity: Not on file   Transportation Needs: No Transportation Needs (6/13/2023)    PRAPARE - Transportation     Lack of Transportation (Medical): No     Lack of Transportation (Non-Medical):  No   Physical Activity: Sufficiently Active (12/20/2021)    Exercise Vital Sign     Days of Exercise per Week: 4 days     Minutes of Exercise per Session: 40 min   Recent Concern: Physical Activity - Insufficiently Active (12/1/2021)    Exercise Vital Sign     Days of Exercise per Week: 4 days     Minutes of Exercise per Session: 30 min   Stress: No Stress Concern Present (6/13/2023)    [facility]     Feeling of Stress : Not at all   Social Connections: Not on file   Intimate Partner Violence: Not on file   Housing Stability: Not on file       Current Outpatient Medications:     alendronate (FOSAMAX) 70 mg tablet, TAKE ONE TABLET BY MOUTH ONCE A WEEK., Disp: 12 tablet, Rfl: 2    aspirin 81 MG tablet, Take by mouth, Disp: , Rfl:     atorvastatin (LIPITOR) 20 mg tablet, TAKE 1 TABLET BY MOUTH EVERY DAY, Disp: 90 tablet, Rfl: 0    BIOTIN PO, Take by mouth, Disp: , Rfl:     Calcium Carbonate-Vitamin D 600-200 MG-UNIT TABS, Take by mouth, Disp: , Rfl:     multivitamin (THERAGRAN) TABS, Take by mouth, Disp: , Rfl:     PARoxetine (PAXIL) 10 mg tablet, TAKE 1 TABLET BY MOUTH EVERY DAY, Disp: 90 tablet, Rfl: 1    SUMAtriptan (IMITREX) 50 mg tablet, 1 (ONE) TABLET BY MOUTH DAILY, AS NEEDED, TAKE ONE TABLET AT HEADACHE ONSET, Disp: , Rfl:     traZODone (DESYREL) 50 mg tablet, Take 1 tablet (50 mg total) by mouth daily at bedtime, Disp: 90 tablet, Rfl: 3    SUMAtriptan (IMITREX) 100 mg tablet, 1 tablet as needed for headache (Patient not taking: Reported on 3/30/2023), Disp: 10 tablet, Rfl: 0  Allergies   Allergen Reactions    Dust Mite Extract     Pollen Extract      Vitals:    10/10/23 1436   BP: 126/86   Pulse: 68   Resp: 16   Temp: 98 °F (36.7 °C)   SpO2: 98% ROS:  CONSTITUTIONAL:  No fever. No chills. No dizziness. No weakness. EYES:  No pain, erythema, or discharge. No blurring of vision. ENT:  No sore throat, URI symptoms. No epistaxis. No tinnitus. CARDIOVASCULAR:  No chest pain. No palpitations. No lower extremity edema. RESPIRATORY:  No shortness of breath, cough, pain with respiration, pleuritic chest pain. No hemoptysis. No dyspnea. No paroxysmal nocturnal dyspnea. GASTROINTESTINAL:  Normal appetite. No nausea, vomiting, diarrhea. No pain. No bloating. No melena. GENITOURINARY:  No frequency, urgency, nocturia. No hematuria or dysuria. MUSCULOSKELETAL:  No arthralgias or myalgias. INTEGUMENTARY:  No swelling. No bruising. No contusions. No abrasions. No lymphangitis. NEUROLOGIC:  No headache. No neck pain. No numbness or tingling of the extremities. No weakness. PSYCHIATRIC:  No confusion. ENDOCRINE:  No fatigue. No weakness. No history of thyroid, diabetes or adrenal problems. HEMATOLOGICAL:  No bleeding. No petechiae. No bruising.     PHYSICAL EXAM:    GENERAL: AAO x 3  HEENT: AT,NC  CVS: S1S2 RRR  LUNGS: CTA b/l  ABD: NT,ND, +BS  EXTR: no edema  NEURO: CN II-XII grossly intact    LABS:  I have reviewed pertinent labs:  CBC:   Lab Results   Component Value Date    WBC 4.45 09/29/2023    RBC 4.22 09/29/2023    HGB 13.5 09/29/2023    HCT 40.3 09/29/2023    MCV 96 09/29/2023     09/29/2023    MCH 32.0 09/29/2023    MCHC 33.5 09/29/2023    RDW 11.8 09/29/2023    MPV 10.4 09/29/2023    NEUTROABS 2.42 09/29/2023     CMP:   Lab Results   Component Value Date    SODIUM 141 09/29/2023    K 4.3 09/29/2023     09/29/2023    CO2 32 09/29/2023    AGAP 8 09/29/2023    BUN 18 09/29/2023    CREATININE 0.80 09/29/2023    GLUC 91 02/22/2022    GLUF 83 09/29/2023    CALCIUM 9.4 09/29/2023    AST 72 (H) 09/29/2023    ALT 48 09/29/2023    ALKPHOS 54 09/29/2023    TP 7.0 09/29/2023    TP 6.8 09/29/2023    ALB 4.0 09/29/2023    TBILI 0.53 09/29/2023 EGFR 74 09/29/2023     Liver Enzymes:   Lab Results   Component Value Date    AST 72 (H) 09/29/2023    ALT 48 09/29/2023    ALKPHOS 54 09/29/2023    TP 7.0 09/29/2023    TP 6.8 09/29/2023    ALB 4.0 09/29/2023    TBILI 0.53 09/29/2023     Vitamin B12 No results found for: "[id number]"  Iron Study   Lab Results   Component Value Date    FERRITIN 44 07/07/2017     Folate No results found for: "FOLATE"  Magnesium No results found for: "MG"  Phosphorus   Lab Results   Component Value Date    PHOS 3.2 12/06/2018     Coagulation Panel No results found for: "DDIMER", "PROTIME", "INR", "PTT"    IMAGING:  No results found. I reviewed the above laboratory and imaging data. ASSESSMENT/PLAN:  MGUS, IgG-kappa (0.14 g/dL). Cr 0.8, Ca 9.4, Hgb 13.5. Continue to monitor. Labs and F/u in 1 year.

## 2023-11-03 DIAGNOSIS — E78.2 MIXED HYPERLIPIDEMIA: ICD-10-CM

## 2023-11-03 RX ORDER — ATORVASTATIN CALCIUM 20 MG/1
TABLET, FILM COATED ORAL
Qty: 90 TABLET | Refills: 0 | Status: SHIPPED | OUTPATIENT
Start: 2023-11-03

## 2023-11-20 DIAGNOSIS — F41.9 ANXIETY: ICD-10-CM

## 2023-11-20 RX ORDER — PAROXETINE 10 MG/1
TABLET, FILM COATED ORAL
Qty: 90 TABLET | Refills: 1 | Status: SHIPPED | OUTPATIENT
Start: 2023-11-20

## 2024-01-04 ENCOUNTER — TELEPHONE (OUTPATIENT)
Age: 74
End: 2024-01-04

## 2024-01-04 ENCOUNTER — OFFICE VISIT (OUTPATIENT)
Age: 74
End: 2024-01-04
Payer: COMMERCIAL

## 2024-01-04 VITALS
HEART RATE: 65 BPM | WEIGHT: 104.2 LBS | RESPIRATION RATE: 16 BRPM | BODY MASS INDEX: 20.46 KG/M2 | TEMPERATURE: 98.6 F | HEIGHT: 60 IN | OXYGEN SATURATION: 98 % | SYSTOLIC BLOOD PRESSURE: 114 MMHG | DIASTOLIC BLOOD PRESSURE: 57 MMHG

## 2024-01-04 DIAGNOSIS — M81.0 OSTEOPOROSIS, POST-MENOPAUSAL: ICD-10-CM

## 2024-01-04 DIAGNOSIS — F51.01 PRIMARY INSOMNIA: ICD-10-CM

## 2024-01-04 DIAGNOSIS — F41.9 ANXIETY: ICD-10-CM

## 2024-01-04 DIAGNOSIS — E78.2 MIXED HYPERLIPIDEMIA: Primary | ICD-10-CM

## 2024-01-04 DIAGNOSIS — D47.2 MGUS (MONOCLONAL GAMMOPATHY OF UNKNOWN SIGNIFICANCE): ICD-10-CM

## 2024-01-04 PROBLEM — L82.1 SEBORRHEIC KERATOSIS: Status: RESOLVED | Noted: 2018-08-15 | Resolved: 2024-01-04

## 2024-01-04 PROBLEM — R74.8 ABNORMAL TRANSAMINASES: Status: RESOLVED | Noted: 2017-06-20 | Resolved: 2024-01-04

## 2024-01-04 PROBLEM — E88.89 STEATOSIS (HCC): Status: RESOLVED | Noted: 2018-07-12 | Resolved: 2024-01-04

## 2024-01-04 PROBLEM — Z13.89 SCREENING FOR SKIN CONDITION: Status: RESOLVED | Noted: 2018-08-15 | Resolved: 2024-01-04

## 2024-01-04 PROBLEM — Z00.00 HEALTHCARE MAINTENANCE: Status: RESOLVED | Noted: 2020-07-15 | Resolved: 2024-01-04

## 2024-01-04 PROBLEM — R30.0 DYSURIA: Status: RESOLVED | Noted: 2020-04-28 | Resolved: 2024-01-04

## 2024-01-04 PROBLEM — Z23 NEED FOR VACCINATION FOR PNEUMOCOCCUS: Status: RESOLVED | Noted: 2018-07-12 | Resolved: 2024-01-04

## 2024-01-04 PROBLEM — L57.0 ACTINIC KERATOSIS: Status: RESOLVED | Noted: 2018-08-15 | Resolved: 2024-01-04

## 2024-01-04 PROCEDURE — 99214 OFFICE O/P EST MOD 30 MIN: CPT | Performed by: INTERNAL MEDICINE

## 2024-01-04 NOTE — PROGRESS NOTES
Falls Plan of Care: balance, strength, and gait training instructions were provided.     INTERNAL MEDICINE [place] [address]  Tel: [phone]      NAME: Dai Julian  AGE: 73 y.o.  SEX: female  : 1950   MRN: [id number]    DATE: 2024  TIME: 11:42 AM      Assessment and Plan:  1. Mixed hyperlipidemia  Continue atorvastatin, follow-up with the lipid profile    2. Osteoporosis, post-menopausal  Patient was on Prolia 1-1/2 years ago when she was told to discontinue it because of dental work.  She was started on Fosamax but she wants to go back on the Prolia as her DEXA scan got  worse this time    3. Primary insomnia  Continue trazodone    4. MGUS (monoclonal gammopathy of unknown significance)  Follow-up with oncology    5. Anxiety  Continue Paxil      - Counseling Documentation: patient was counseled regarding: diagnostic results, instructions for management, risk factor reductions, prognosis, patient and family education, risks and benefits of treatment options, and importance of compliance with treatment  - Medication Side Effects: Adverse side effects of medications were reviewed with the patient/guardian today.      Return for follow up visit in 6 months or earlier, if needed.      Chief Complaint:  Chief Complaint   Patient presents with    Establish Care         History of Present Illness:   Cholesterol is stable on the atorvastatin.  She has osteoporosis and her recent DEXA scan shows some deterioration since she started taking the Fosamax instead of the Prolia.  Trazodone helps her sleep but she only takes 25 mg daily  She has been taking Paxil for anxiety but wants to wean herself off it.  She was told to do it gradually  She was diagnosed with MGUS and has been following up with the oncologist every year      Active Problem List:  Patient Active Problem List   Diagnosis    Hyperlipidemia    MGUS  (monoclonal gammopathy of unknown significance)    Migraine    Osteoporosis, post-menopausal    Arthritis    History of skin cancer    Anxiety    Primary insomnia         Past Medical History:  Past Medical History:   Diagnosis Date    Colon polyp     H/O nonmelanoma skin cancer     last assessed 8/16/17         Past Surgical History:  Past Surgical History:   Procedure Laterality Date    COLONOSCOPY      last assessed 12/31/14, next in 2016    WISDOM TOOTH EXTRACTION  06/2022         Family History:  History reviewed. No pertinent family history.      Social History:  Social History     Socioeconomic History    Marital status: /Civil Union     Spouse name: None    Number of children: None    Years of education: None    Highest education level: None   Occupational History    None   Tobacco Use    Smoking status: Never    Smokeless tobacco: Never   Vaping Use    Vaping status: Never Used   Substance and Sexual Activity    Alcohol use: Yes     Alcohol/week: 2.0 standard drinks of alcohol     Types: 2 Glasses of wine per week     Comment: socially    Drug use: No    Sexual activity: Not Currently   Other Topics Concern    None   Social History Narrative    None     Social Determinants of Health     Financial Resource Strain: Medium Risk (6/13/2023)    Overall Financial Resource Strain (CARDIA)     Difficulty of Paying Living Expenses: Somewhat hard   Food Insecurity: Not on file   Transportation Needs: No Transportation Needs (6/13/2023)    PRAPARE - Transportation     Lack of Transportation (Medical): No     Lack of Transportation (Non-Medical): No   Physical Activity: Sufficiently Active (12/20/2021)    Exercise Vital Sign     Days of Exercise per Week: 4 days     Minutes of Exercise per Session: 40 min   Recent Concern: Physical Activity - Insufficiently Active (12/1/2021)    Exercise Vital Sign     Days of Exercise per Week: 4 days     Minutes of Exercise per Session: 30 min   Stress: No Stress Concern  Present (6/13/2023)    [facility] - Occupational Stress Questionnaire     Feeling of Stress : Not at all   Social Connections: Not on file   Intimate Partner Violence: Not on file   Housing Stability: Not on file         Allergies:  Allergies   Allergen Reactions    Dust Mite Extract     Pollen Extract          Medications:    Current Outpatient Medications:     alendronate (FOSAMAX) 70 mg tablet, TAKE ONE TABLET BY MOUTH ONCE A WEEK., Disp: 12 tablet, Rfl: 2    aspirin 81 MG tablet, Take by mouth, Disp: , Rfl:     atorvastatin (LIPITOR) 20 mg tablet, TAKE 1 TABLET BY MOUTH EVERY DAY, Disp: 90 tablet, Rfl: 0    BIOTIN PO, Take by mouth, Disp: , Rfl:     Calcium Carbonate-Vitamin D 600-200 MG-UNIT TABS, Take by mouth, Disp: , Rfl:     multivitamin (THERAGRAN) TABS, Take by mouth, Disp: , Rfl:     PARoxetine (PAXIL) 10 mg tablet, TAKE 1 TABLET BY MOUTH EVERY DAY, Disp: 90 tablet, Rfl: 1    SUMAtriptan (IMITREX) 50 mg tablet, 1 (ONE) TABLET BY MOUTH DAILY, AS NEEDED, TAKE ONE TABLET AT HEADACHE ONSET, Disp: , Rfl:     traZODone (DESYREL) 50 mg tablet, Take 1 tablet (50 mg total) by mouth daily at bedtime (Patient taking differently: Take 25 mg by mouth daily at bedtime), Disp: 90 tablet, Rfl: 3      The following portions of the patient's history were reviewed and updated as appropriate: past medical history, past surgical history, family history, social history, allergies, current medications and active problem list.      Review of Systems:  Constitutional: Denies fever, chills, weight gain, weight loss, fatigue  Eyes: Denies eye redness, eye discharge, double vision, change in visual acuity  ENT: Denies hearing loss, tinnitus, sneezing, nasal congestion, nasal discharge, sore throat   Respiratory: Denies cough, expectoration, hemoptysis, shortness of breath, wheezing  Cardiovascular: Denies chest pain, palpitations, lower extremity swelling, orthopnea, PND  Gastrointestinal: Denies  abdominal pain, heartburn, nausea, vomiting, hematemesis, diarrhea, bloody stools  Genito-Urinary: Denies dysuria, frequency, difficulty in micturition, nocturia, incontinence  Musculoskeletal: Denies back pain, joint pain, muscle pain  Neurologic: Denies confusion, lightheadedness, syncope, headache, focal weakness, sensory changes, seizures  Endocrine: Denies polyuria, polydipsia, temperature intolerance  Allergy and Immunology: Denies hives, insect bite sensitivity  Hematological and Lymphatic: Denies bleeding problems, swollen glands   Psychological: Denies depression, suicidal ideation, anxiety, panic, mood swings  Dermatological: Denies pruritus, rash, skin lesion changes      Vitals:  Vitals:    01/04/24 1101   BP: 114/57   Pulse: 65   Resp: 16   Temp: 98.6 °F (37 °C)   SpO2: 98%       Body mass index is 20.35 kg/m².    Weight (last 2 days)       Date/Time Weight    01/04/24 1101 47.3 (104.2)              Physical Examination:  General: Patient is not in acute distress. Awake, alert, responding to commands. No weight gain or loss  Head: Normocephalic. Atraumatic  Eyes: Conjunctiva and lids with no swelling, erythema or discharge. Both pupils normal sized, round and reactive to light. Sclera nonicteric  ENT: External examination of nose and ear normal. Otoscopic examination shows translucent tympanic membranes with patent canals without erythema. Oropharynx moist with no erythema, edema, exudate or lesions  Neck: Supple. JVP not raised. Trachea midline. No masses. No thyromegaly  Lungs: No signs of increased work of breathing or respiratory distress. Bilateral bronchovascular breath sounds with no crackles or rhonchi  Chest wall: No tenderness  Cardiovascular: Normal PMI. No thrills. Regular rate and rhythm. S1 and S2 normal. No murmur, rub or gallop  Gastrointestinal: Abdomen soft, nontender. No guarding or rigidity. Liver and spleen not palpable. Bowel sounds present  Neurologic: Cranial nerves II-XII  "intact. Cortical functions normal. Motor system - Reflexes 2+ and symmetrical. Sensations normal  Musculoskeletal: Gait normal. No joint tenderness  Integumentary: Skin normal with no rash or lesions  Lymphatic: No palpable lymph nodes in neck, axilla or groin  Extremities: No clubbing, cyanosis, edema or varicosities  Psychological: Judgement and insight normal. Mood and affect normal      Laboratory Results:  CBC with diff:   Lab Results   Component Value Date    WBC 4.45 09/29/2023    WBC 4.7 12/31/2014    RBC 4.22 09/29/2023    RBC 4.30 12/31/2014    HGB 13.5 09/29/2023    HGB 13.8 12/31/2014    HCT 40.3 09/29/2023    HCT 40.4 12/31/2014    MCV 96 09/29/2023    MCV 94 12/31/2014    MCH 32.0 09/29/2023    MCH 32.1 12/31/2014    RDW 11.8 09/29/2023    RDW 11.2 12/31/2014     09/29/2023     12/31/2014       CMP:  Lab Results   Component Value Date    CREATININE 0.80 09/29/2023    CREATININE 0.6 12/31/2014    BUN 18 09/29/2023    BUN 15 12/31/2014     12/31/2014    K 4.3 09/29/2023    K 4.2 12/31/2014     09/29/2023     12/31/2014    CO2 32 09/29/2023    CO2 32.8 (H) 12/31/2014    GLUCOSE 82 12/31/2014    PROT 6.9 12/31/2014    ALKPHOS 54 09/29/2023    ALKPHOS 70 12/31/2014    ALT 48 09/29/2023    ALT 26 12/31/2014    AST 72 (H) 09/29/2023    AST 43 (H) 12/31/2014       Lab Results   Component Value Date    PHOS 3.2 12/06/2018       No results found for: \"TROPONINI\", \"CKMB\", \"CKTOTAL\"    Lipid Profile:   Lab Results   Component Value Date    CHOL 170 12/31/2014     Lab Results   Component Value Date     06/13/2023    HDL 93 08/22/2022     Lab Results   Component Value Date    LDLCALC 70 06/13/2023    LDLCALC 48 08/22/2022     Lab Results   Component Value Date    TRIG 55 06/13/2023    TRIG 52 08/22/2022       Imaging Results:  Colonoscopy  Narrative:  [facility]  [address]  [place] [address]  [phone]    DATE OF " SERVICE:  8/25/22    PHYSICIAN(S):  Attending:   Narciso Maguire MD     Fellow:   No Staff Documented     INDICATION:  History of colon polyps    POST-OP DIAGNOSIS:  See the impression below.    HISTORY:  Prior colonoscopy: 5 years ago.    BOWEL PREPARATION:  Miralax/Dulcolax    PREPROCEDURE:  Informed consent was obtained for the procedure, including sedation. Risks   including but not limited to bleeding, infection, perforation, adverse   drug reaction and aspiration were explained in detail. Also explained   about less than 100% sensitivity with the exam and other alternatives. The   patient was placed in the left lateral decubitus position.    DETAILS OF PROCEDURE:  Patient was taken to the procedure room where a time out was performed to   confirm correct patient and correct procedure. The patient underwent   monitored anesthesia care, which was administered by an anesthesia   professional. The patient's blood pressure, heart rate, level of   consciousness, oxygen and respirations were monitored throughout the   procedure. A digital rectal exam was performed. The scope was introduced   through the anus and advanced to the cecum. Retroflexion was performed in   the rectum. The quality of bowel preparation was evaluated using the   [place] Bowel Preparation Scale with scores of: right colon = 2, transverse   colon = 2, left colon = 2. The total BBPS score was 6. Bowel prep was   adequate. The patient's estimated blood loss was minimal (<5 mL). The   procedure was not difficult. The patient tolerated the procedure well.   There were no apparent complications.     ANESTHESIA INFORMATION:  ASA: II  Anesthesia Type: IV Sedation with Anesthesia    MEDICATIONS:  No administrations occurring from 0857 to 0915 on 08/25/22     FINDINGS:  One 2 mm sessile polyp in the hepatic flexure; performed complete en bloc   removal by cold forceps biopsy  Few small mild diverticula in the sigmoid colon  All observed locations  appeared normal, including the cecum, ascending   colon, transverse colon, splenic flexure, descending colon, rectosigmoid   and rectum.    EVENTS:  Procedure Events   Event Event Time   ENDO CECUM REACHED 8/25/2022  9:07 AM   ENDO SCOPE OUT TIME 8/25/2022  9:14 AM     SPECIMENS:  ID Type Source Tests Collected by Time Destination   1 : hepatic flexure Tissue Polyp, Colorectal TISSUE EXAM Narciso Maguire MD 8/25/2022  9:13 AM      EQUIPMENT:  Colonoscope -[id number]   Impression: 1. Hepatic flexure polyp status post excisional biopsy  Removal  2. Minor sigmoid diverticulosis     RECOMMENDATION:  Repeat colonoscopy in 5 years due to a personal history of colon polyps  Follow-up biopsy results in 3 weeks       Narciso Maguire MD        Health Maintenance:  Health Maintenance   Topic Date Due    Falls: Plan of Care  Never done    DXA SCAN  10/18/2022    Influenza Vaccine (1) 09/01/2023    COVID-19 Vaccine (5 - 2023-24 season) 09/01/2023    Depression Screening  06/13/2024    Fall Risk  06/13/2024    Urinary Incontinence Screening  06/13/2024    Medicare Annual Wellness Visit (AWV)  06/13/2024    Breast Cancer Screening: Mammogram  08/25/2024    Colorectal Cancer Screening  08/24/2027    Hepatitis C Screening  Completed    Osteoporosis Screening  Completed    Pneumococcal Vaccine: 65+ Years  Completed    HIB Vaccine  Aged Out    IPV Vaccine  Aged Out    Hepatitis A Vaccine  Aged Out    Meningococcal ACWY Vaccine  Aged Out    HPV Vaccine  Aged Out     Immunization History   Administered Date(s) Administered    COVID-19 J&J (Pilar) vaccine 0.5 mL 03/13/2021    COVID-19 PFIZER VACCINE 0.3 ML IM 10/26/2021, 04/17/2022    COVID-19 Pfizer Vac BIVALENT Ben-sucrose 12 Yr+ IM 10/24/2022    INFLUENZA 10/03/2021, 10/21/2022    Influenza, seasonal, injectable 10/03/2021    Pneumococcal Conjugate 13-Valent 07/12/2018    Pneumococcal Polysaccharide PPV23 04/03/2019    Respiratory Syncytial Virus Vaccine (Recombinant,  Adjuvanted) 12/15/2023    Tdap 11/07/2022    Zoster Vaccine Recombinant 09/20/2018, 03/19/2019         David Jefferson MD  1/4/2024,11:42 AM

## 2024-01-04 NOTE — TELEPHONE ENCOUNTER
----- Message from David Jefferson MD sent at 1/4/2024 12:22 PM EST -----  Chcuho Knight,  This patient was on Prolia a year and a half ago and was taken off it due to some dental procedures.  Now she is on Fosamax but wants to go back on the Prolia as her DEXA scan is worse this time.    Thanks,  David

## 2024-01-04 NOTE — TELEPHONE ENCOUNTER
PROLIA-Insurance verified    Spoke with patient  Ins-[facility]  Insurance verified-1/4/24  does not anticipate any changes in the near future.    Insurance info>"[employer]" for verification and Summary of Benefits

## 2024-01-23 ENCOUNTER — TELEPHONE (OUTPATIENT)
Age: 74
End: 2024-01-23

## 2024-01-23 NOTE — TELEPHONE ENCOUNTER
PROLIA-Received SOB/Appt. scheduled  Spoke w/patient    Last dose-NEW to our office (Previously admin by GYN)    *Appt. Scheduled-1/26/24  *Med order-[id number]  *Delivery-1/24/24

## 2024-01-26 ENCOUNTER — CLINICAL SUPPORT (OUTPATIENT)
Age: 74
End: 2024-01-26
Payer: COMMERCIAL

## 2024-01-26 DIAGNOSIS — M81.0 OSTEOPOROSIS, POST-MENOPAUSAL: Primary | ICD-10-CM

## 2024-01-26 PROCEDURE — 96372 THER/PROPH/DIAG INJ SC/IM: CPT

## 2024-01-30 ENCOUNTER — TELEPHONE (OUTPATIENT)
Age: 74
End: 2024-01-30

## 2024-02-01 DIAGNOSIS — E78.2 MIXED HYPERLIPIDEMIA: ICD-10-CM

## 2024-02-01 RX ORDER — ATORVASTATIN CALCIUM 20 MG/1
TABLET, FILM COATED ORAL
Qty: 90 TABLET | Refills: 0 | Status: SHIPPED | OUTPATIENT
Start: 2024-02-01

## 2024-04-01 ENCOUNTER — OFFICE VISIT (OUTPATIENT)
Age: 74
End: 2024-04-01
Payer: COMMERCIAL

## 2024-04-01 VITALS — HEIGHT: 60 IN | BODY MASS INDEX: 20.62 KG/M2 | TEMPERATURE: 97.9 F | WEIGHT: 105 LBS

## 2024-04-01 DIAGNOSIS — D22.9 MULTIPLE NEVI: ICD-10-CM

## 2024-04-01 DIAGNOSIS — D18.01 CHERRY ANGIOMA: ICD-10-CM

## 2024-04-01 DIAGNOSIS — Z85.828 HISTORY OF SKIN CANCER: ICD-10-CM

## 2024-04-01 DIAGNOSIS — L82.1 SEBORRHEIC KERATOSIS: ICD-10-CM

## 2024-04-01 DIAGNOSIS — Z13.89 SCREENING FOR SKIN CONDITION: Primary | ICD-10-CM

## 2024-04-01 PROCEDURE — 99213 OFFICE O/P EST LOW 20 MIN: CPT | Performed by: DERMATOLOGY

## 2024-04-01 NOTE — PROGRESS NOTES
"[facility] Dermatology Clinic Note     Patient Name: Dai Julian  Encounter Date: April 1, 2024     Have you been cared for by a [facility] Dermatologist in the last 3 years and, if so, which description applies to you?    Yes.  I have been here within the last 3 years, and my medical history has NOT changed since that time.  I am FEMALE/of child-bearing potential.    REVIEW OF SYSTEMS:  Have you recently had or currently have any of the following? No changes in my recent health.   PAST MEDICAL HISTORY:  Have you personally ever had or currently have any of the following?  If \"YES,\" then please provide more detail. No changes in my medical history.   HISTORY OF IMMUNOSUPPRESSION: Do you have a history of any of the following:  Systemic Immunosuppression such as Diabetes, Biologic or Immunotherapy, Chemotherapy, Organ Transplantation, Bone Marrow Transplantation?  No     Answering \"YES\" requires the addition of the dotphrase \"IMMUNOSUPPRESSED\" as the first diagnosis of the patient's visit.   FAMILY HISTORY:  Any \"first degree relatives\" (parent, brother, sister, or child) with the following?    No changes in my family's known health.   PATIENT EXPERIENCE:    Do you want the Dermatologist to perform a COMPLETE skin exam today including a clinical examination under the \"bra and underwear\" areas?  Yes  If necessary, do we have your permission to call and leave a detailed message on your Preferred Phone number that includes your specific medical information?  Yes      Allergies   Allergen Reactions    Dust Mite Extract     Pollen Extract       Current Outpatient Medications:     aspirin 81 MG tablet, Take by mouth, Disp: , Rfl:     atorvastatin (LIPITOR) 20 mg tablet, TAKE 1 TABLET BY MOUTH EVERY DAY, Disp: 90 tablet, Rfl: 0    BIOTIN PO, Take by mouth, Disp: , Rfl:     Calcium Carbonate-Vitamin D 600-200 MG-UNIT TABS, Take by mouth, Disp: , Rfl:     multivitamin (THERAGRAN) TABS, Take by mouth, Disp: , Rfl:     " "PARoxetine (PAXIL) 10 mg tablet, TAKE 1 TABLET BY MOUTH EVERY DAY, Disp: 90 tablet, Rfl: 1    SUMAtriptan (IMITREX) 50 mg tablet, 1 (ONE) TABLET BY MOUTH DAILY, AS NEEDED, TAKE ONE TABLET AT HEADACHE ONSET, Disp: , Rfl:     traZODone (DESYREL) 50 mg tablet, Take 1 tablet (50 mg total) by mouth daily at bedtime (Patient taking differently: Take 25 mg by mouth daily at bedtime), Disp: 90 tablet, Rfl: 3    Current Facility-Administered Medications:     denosumab (PROLIA) subcutaneous injection 60 mg, 60 mg, Subcutaneous, Q6 Months, David Jefferson MD, 60 mg at 01/26/24 1105          Whom besides the patient is providing clinical information about today's encounter?   NO ADDITIONAL HISTORIAN (patient alone provided history)    Physical Exam and Assessment/Plan by Diagnosis:    Chief complaint: Patient is a 74 y/o female present for a routine skin exam with a personal history of non-melanoma skin cancer and no spots of concern.    HISTORY OF Non- Melanoma skin cancer  Physical Exam:  Anatomic Location Affected:  Records not available  Morphological Description of scar:  Well healed  Suspected Recurrence: No  Pertinent Positives:  Pertinent Negatives:      Additional History of Present Condition:  History of non-melanoma skin cancer with no sign of recurrence    Assessment and Plan:  Based on a thorough discussion of this condition and the management approach to it (including a comprehensive discussion of the known risks, side effects and potential benefits of treatment), the patient (family) agrees to implement the following specific plan:  Continue to monitor yearly  Sun protection    MELANOCYTIC NEVI (\"Moles\")    Physical Exam:  Anatomic Location Affected: Mostly on sun-exposed areas of the trunk and extremities  Morphological Description:  Scattered, 1-4mm round to ovoid, symmetrical-appearing, even bordered, skin colored to dark brown macules/papules, mostly in sun-exposed areas  Pertinent Positives:  Pertinent " "Negatives:    Additional History of Present Condition:  present on exam    Assessment and Plan:  Based on a thorough discussion of this condition and the management approach to it (including a comprehensive discussion of the known risks, side effects and potential benefits of treatment), the patient (family) agrees to implement the following specific plan:  Provided handout with information regarding the ABCDE's of moles   Recommend routine skin exams every year   Sun avoidance, protective clothing (known as UPF clothing), and the use of at least SPF 30 sunscreens is advised. Sunscreen should be reapplied every two hours when outside.     SEBORRHEIC KERATOSIS; NON-INFLAMED    Physical Exam:  Anatomic Location Affected:  scattered across trunk, extremities, face  Morphological Description:  Flat and raised, waxy, smooth to warty textured, yellow to brownish-grey to dark brown to blackish, discrete, \"stuck-on\" appearing papules.  Pertinent Positives:  Pertinent Negatives:    Additional History of Present Condition:  Patient reports new bumps on the skin.  Denies itch, burn, pain, bleeding or ulceration.  Present constantly; nothing seems to make it worse or better.  No prior treatment.      Assessment and Plan:  Based on a thorough discussion of this condition and the management approach to it (including a comprehensive discussion of the known risks, side effects and potential benefits of treatment), the patient (family) agrees to implement the following specific plan:  Reassured benign    ANGIOMA (\"CHERRY ANGIOMA\")    Physical Exam:  Anatomic Location: scattered across sun exposed areas of the trunk and extremities   Morphologic Description: Firm red to reddish-blue discrete papules  Pertinent Positives:  Pertinent Negatives:    Additional History of Present Condition:  Present on exam.     Assessment and Plan:  Reassured benign      Scribe Attestation      I,:  Sandra Jean am acting as a scribe while in the " presence of the attending physician.:       I,:  William Batista MD personally performed the services described in this documentation    as scribed in my presence.:

## 2024-04-01 NOTE — PATIENT INSTRUCTIONS
ACTINIC KERATOSIS    Actinic keratoses are very common on sites repeatedly exposed to the sun, especially the backs of the hands and the face, most often affecting the ears, nose, cheeks, upper lip, vermilion of the lower lip, temples, forehead and balding scalp. In severely chronically sun-damaged individuals, they may also be found on the upper trunk, upper and lower limbs, and dorsum of feet.    We discussed the theoretical premalignant (“pre-cancerous”) nature and etiology of these growths.  We discussed the prevailing notion that actinic keratoses are a reflection of abnormal skin cell development due to DNA damage by short wavelength UVB.  They are more likely to appear if the immune function is poor, due to aging, recent sun exposure, predisposing disease or certain drugs.    We discussed that the main concern is that actinic keratoses may predispose to squamous cell carcinoma. It is rare for a solitary actinic keratosis to evolve to squamous cell carcinoma (SCC), but the risk of SCC occurring at some stage in a patient with more than 10 actinic keratoses is thought to be about 10 to 15%. A tender, thickened, ulcerated or enlarging actinic keratosis is suspicious of SCC.    Actinic keratoses may be prevented by strict sun protection. If already present, keratoses may improve with a very high sun protection factor (50+) broad-spectrum sunscreen applied at least daily to affected areas, year-round.  We recommend that UPF-rated clothing and hats and sunglasses be worn whenever possible and that a sunscreen-moisturizer combination product such as Neutrogena Daily Defense be applied at least three times a day.    We performed a thorough discussion of treatment options and specific risk/benefits/alternatives including but not limited to medical “field” treatment with medications such as the following:    Topical “field area” medications such as 5-fluorouracil or Aldara (specifically, the trouble with long-term  compliance, blistering and local skin reaction versus the convenience of at-home therapy and that field therapy “gets what is not yet seen”).    Cryotherapy (specifically, local pain, scarring, dyspigmentation, blistering, possible superinfection, and treats “only what we see” versus directed treatment today).    Photodynamic therapy (specifically, local pain, scarring, dyspigmentation, blistering, possible superinfection, need to schedule for a later date, and time spent in the office versus field therapy that “gets what is not yet seen”).      BASAL CELL CARCINOMA    What is basal cell carcinoma?  Basal cell carcinoma (BCC) is a common, locally invasive, keratinocytic, or non-melanoma, skin cancer. It is also known as rodent ulcer and basalioma. Patients with BCC often develop multiple primary tumours over time.    Who gets basal cell carcinoma?  Risk factors for BCC include:  Age and sex: BCCs are particularly prevalent in elderly males. However, they also affect females and younger adults   Previous BCC or other form of skin cancer (squamous cell carcinoma, melanoma)   Sun damage (photoaging, actinic keratoses)   Repeated prior episodes of sunburn   Fair skin, blue eyes and blond or red hair--note; BCC can also affect darker skin types   Previous cutaneous injury, thermal burn, disease (eg cutaneous lupus, sebaceous naevus)   Inherited syndromes: BCC is a particular problem for families with basal cell naevus syndrome (Gorlin syndrome), Igcaw-Rbiké-Ecixkaaa syndrome, Rombo syndrome, Oley syndrome and xeroderma pigmentosum   Other risk factors include ionising radiation, exposure to arsenic, and immune suppression due to disease or medicines    What causes basal cell carcinoma?  The cause of BCC is multifactorial.  Most often, there are DNA mutations in the patched (PTCH) tumour suppressor gene, part of hedgehog signaling pathway   These may be triggered by exposure to ultraviolet radiation   Various spontaneous  and inherited gene defects predispose to BCC    What are the clinical features of basal cell carcinoma?  BCC is a locally invasive skin tumour. The main characteristics are:  Slowly growing plaque or nodule   Skin coloured, pink or pigmented   Varies in size from a few millimetres to several centimetres in diameter   Spontaneous bleeding or ulceration  BCC is very rarely a threat to life. A tiny proportion of BCCs grow rapidly, invade deeply, and/or metastasise to local lymph nodes.    Types of basal cell carcinoma  There are several distinct clinical types of BCC, and over 20 histological growth patterns of BCC.  Nodular BCC  Most common type of facial BCC   Shiny or pearly nodule with a smooth surface   May have central depression or ulceration, so its edges appear rolled   Blood vessels cross its surface   Cystic variant is soft, with jelly-like contents   Micronodular, microcystic and infiltrative types are potentially aggressive subtypes   Also known as nodulocystic carcinoma  Superficial BCC  Most common type in younger adults   Most common type on upper trunk and shoulders   Slightly scaly, irregular plaque   Thin, translucent rolled border   Multiple microerosions  Morphoeaform BCC  Usually found in mid-facial sites   Waxy, scar-like plaque with indistinct borders   Wide and deep subclinical extension   May infiltrate cutaneous nerves (perineural spread)   Also known as morpheic, morphoeiform or sclerosing BCC  Basosquamous carcinoma  Mixed basal cell carcinoma (BCC) and squamous cell carcinoma (SCC)   Infiltrative growth pattern   Potentially more aggressive than other forms of BCC   Also known as basisquamous carcinoma and mixed basal-squamous cell carcinoma       Complications of basal cell carcinoma    Recurrent BCC  Recurrence of BCC after initial treatment is not uncommon. Characteristics of recurrent BCC often include:  Incomplete excision or narrow margins at primary excision   Morphoeic,  micronodular, and infiltrative subtypes   Location on head and neck    Advanced BCC  Advanced BCCs are large, often neglected tumours.  They may be several centimetres in diameter   They may be deeply infiltrating into tissues below the skin   They are difficult or impossible to treat surgically    Metastatic BCC  Very rare   Primary tumour is often large, neglected or recurrent, located on head and neck, with aggressive subtype   May have had multiple prior treatments   May arise in site exposed to ionising radiation   Can be fatal    How is basal cell carcinoma diagnosed?  BCC is diagnosed clinically by the presence of a slowly enlarging skin lesion with typical appearance. The diagnosis and  by a diagnostic biopsy or following excision.  Some typical superficial BCCs on trunk and limbs are clinically diagnosed and have non-surgical treatment without histology.    What is the treatment for primary basal cell carcinoma?  The treatment for a BCC depends on its type, size and location, the number to be treated, patient factors, and the preference or expertise of the doctor. Most BCCs are treated surgically. Long-term follow-up is recommended to check for new lesions and recurrence; the latter may be unnecessary if histology has reported wide clear margins.    Excision biopsy  Excision means the lesion is cut out and the skin stitched up.  Most appropriate treatment for nodular, infiltrative and morphoeic BCCs   Should include 3 to 5 mm margin of normal skin around the tumour   Very large lesions may require flap or skin graft to repair the defect   Pathologist will report deep and lateral margins   Further surgery is recommended for lesions that are incompletely excised    Mohs micrographically controlled excision  Mohs micrographically controlled surgery involves examining carefully marked excised tissue under the microscope, layer by layer, to ensure complete excision.  Very high cure rates achieved by trained Mohs  surgeons   Used in high-risk areas of the face around eyes, lips and nose   Suitable for ill-defined, morphoeic, infiltrative and recurrent subtypes   Large defects are repaired by flap or skin graft    Superficial skin surgery  Superficial skin surgery comprises shave, curettage, and electrocautery. It is a rapid technique using local anaesthesia and does not require sutures.  Suitable for small, well-defined nodular or superficial BCCs   Lesions are usually located on trunk or limbs   Wound is left open to heal by secondary intention   Moist wound dressings lead to healing within a few weeks   Eventual scar quality variable    Cryotherapy  Cryotherapy is the treatment of a superficial skin lesion by freezing it, usually with liquid nitrogen.  Suitable for small superficial BCCs on covered areas of trunk and limbs   Best avoided for BCCs on head and neck, and distal to knees   Double freeze-thaw technique   Results in a blister that crusts over and heals within several weeks.   Leaves permanent white guzman    Photodynamic therapy  Photodynamic therapy (PDT) refers to a technique in which BCC is treated with a photosensitising chemical, and exposed to light several hours later.  Topical photosensitisers include aminolevulinic acid lotion and methyl aminolevulinate cream   Suitable for low-risk small, superficial BCCs   Best avoided if tumour in site at high risk of recurrence   Results in inflammatory reaction, maximal 3-4 days after procedure   Treatment repeated 7 days after initial treatment   Excellent cosmetic results    Imiquimod cream  Imiquimod is an immune response modifier.  Best used for superficial BCCs less than 2 cm diameter   Applied three to five times each week, for 6-16 weeks   Results in a variable inflammatory reaction, maximal at three weeks   Minimal scarring is usual    Fluorouracil cream  5-Fluorouracil cream is a topical cytotoxic agent.  Used to treat small superficial basal cell carcinomas    Requires prolonged course, eg twice daily for 6-12 weeks   Causes inflammatory reaction   Has high recurrence rates    Radiotherapy  Radiotherapy or X-ray treatment can be used to treat primary BCCs or as adjunctive treatment if margins are incomplete.  Mainly used if surgery is not suitable   Best avoided in young patients and in genetic conditions predisposing to skin cancer   Best cosmetic results achieved using multiple fractions   Typically, patient attends once-weekly for several weeks   Causes inflammatory reaction followed by scar   Risk of radiodermatitis, late recurrence, and new tumours    What is the treatment for advanced or metastatic basal cell carcinoma?  Locally advanced primary, recurrent or metastatic BCC requires multidisciplinary consultation. Often a combination of treatments is used.  Surgery   Radiotherapy   Targeted therapy  Targeted therapy refers to the hedgehog signalling pathway inhibitors, vismodegib and sonidegib. These drugs have some important risks and side effects.    How can basal cell carcinoma be prevented?  The most important way to prevent BCC is to avoid sunburn. This is especially important in childhood and early life. Fair skinned individuals and those with a personal or family history of BCC should protect their skin from sun exposure daily, year-round and lifelong.  Stay indoors or under the shade in the middle of the day   Wear covering clothing   Apply high protection factor SPF50+ broad-spectrum sunscreens generously to exposed skin if outdoors   Avoid indoor tanning (sun beds, solaria)  Oral nicotinamide (vitamin B3) in a dose of 500 mg twice daily may reduce the number and severity of BCCs.    What is the outlook for basal cell carcinoma?  Most BCCs are cured by treatment. Cure is most likely if treatment is undertaken when the lesion is small.  About 50% of people with BCC develop a second one within 3 years of the first. They are also at increased risk of other  skin cancers, especially melanoma. Regular self-skin examinations and long-term annual skin checks by an experienced health professional are recommended.        SQUAMOUS CELL CARCINOMA    What is cutaneous squamous cell carcinoma?  Cutaneous squamous cell carcinoma (SCC) is a common type of keratinocyte or non-melanoma skin cancer. It is derived from cells within the epidermis that make keratin -- the horny protein that makes up skin, hair and nails.  Cutaneous SCC is an invasive disease, referring to cancer cells that have grown beyond the epidermis. SCC can sometimes metastasise and may prove fatal.  Intraepidermal carcinoma (cutaneous SCC in situ) and mucosal SCC are considered elsewhere.    Who gets cutaneous squamous cell carcinoma?  Risk factors for cutaneous SCC include:  Age and sex: SCCs are particularly prevalent in elderly males. However, they also affect females and younger adults.   Previous SCC or another form of skin cancer (basal cell carcinoma, melanoma) are a strong predictor for further skin cancers.   Actinic keratoses   Outdoor occupation or recreation   Smoking   Fair skin, blue eyes and blond or red hair   Previous cutaneous injury, thermal burn, disease (eg cutaneous lupus, epidermolysis bullosa, leg ulcer)   Inherited syndromes: SCC is a particular problem for families with xeroderma pigmentosum and albinism   Other risk factors include ionising radiation, exposure to arsenic, and immune suppression due to disease (eg chronic lymphocytic leukaemia) or medicines. Organ transplant recipients have a massively increased risk of developing SCC.    What causes cutaneous squamous cell carcinoma?  More than 90% of cases of SCC are associated with numerous DNA mutations in multiple somatic genes. Mutations in the p53 tumour suppressor gene are caused by exposure to ultraviolet radiation (UV), especially UVB (known as signature 7). Other signature mutations relate to cigarette smoking, ageing and  immune suppression (eg, to drugs such as azathioprine). Mutations in signalling pathways affect the epidermal growth factor receptor, FLORENCIO, Fyn, and [id number] signalling.   Beta-genus human papillomaviruses (wart virus) are thought to play a role in SCC arising in immune-suppressed populations. ?-HPV and HPV subtypes 5, 8, 17, 20, 24, and 38 have also been associated with an increased risk of cutaneous SCC in immunocompetent individuals.     What are the clinical features of cutaneous squamous cell carcinoma?  Cutaneous SCCs present as enlarging scaly or crusted lumps. They usually arise within pre-existing actinic keratosis or intraepidermal carcinoma.  They grow over weeks to months   They may ulcerate   They are often tender or painful   Located on sun-exposed sites, particularly the face, lips, ears, hands, forearms and lower legs   Size varies from a few millimetres to several centimetres in diameter.    Types of cutaneous squamous cell carcinoma  Distinct clinical types of invasive cutaneous SCC include:  Cutaneous horn -- the horn is due to excessive production of keratin   Keratoacanthoma (KA) -- a rapidly growing keratinising nodule that may resolve without treatment   Carcinoma cuniculatum (‘verrucous carcinoma’), a slow-growing, warty tumour on the sole of the foot.   Multiple eruptive SCC/KA-like lesions arising in syndromes, such as multiple self-healing squamous epitheliomas of Moran-Smith and Grzybowski syndrome  The pathologist may classify a tumour as well differentiated, moderately well differentiated, poorly differentiated or anaplastic cutaneous SCC. There are other variants.    Classification of squamous cell carcinoma by risk  Cutaneous SCC is classified as low-risk or high-risk, depending on the chance of tumour recurrence and metastasis. Characteristics of high-risk SCC include:  High-risk cutaneous squamous cell carcinoma has the following characteristics:  Diameter greater than or equal to  2 cm   Location on the ear, vermilion of the lip, central face, hands, feet, genitalia   Arising in elderly or immune suppressed patient   Histological thickness greater than 2 mm, poorly differentiated histology, or with the invasion of the subcutaneous tissue, nerves and blood vessels  Metastatic SCC is found in regional lymph nodes (80%), lungs, liver, brain, bones and skin.    Staging cutaneous squamous cell carcinoma  In 2011, the American Joint Committee on Cancer (AJCC) published a new staging systemic for cutaneous SCC for the 7th Edition of the AJCC manual. This evaluates the dimensions of the original primary tumour (T) and its metastases to lymph nodes (N).    Tumour staging for cutaneous SCC  TX: Th Primary tumour cannot be assessed  T0: No evidence of a primary tumour  Tis: Carcinoma in situ  T1: Tumour ? 2cm without high-risk features  T2: Tumour ? 2cm; or; Tumour ? 2 cm with high-risk features  T3: Tumour with the invasion of maxilla, mandible, orbit or temporal bone  T4: Tumour with the invasion of axial or appendicular skeleton or perineural invasion of skull base    Erika staging for cutaneous SCC  NX: Regional lymph nodes cannot be assessed  N0: No regional lymph node metastasis  N1: Metastasis in one local lymph node ? 3cm  N2: Metastasis in one local lymph node ? 3cm; or; Metastasis in >1 local lymph node ? 6cm  N3: Metastasis in lymph node ? 6cm    How is squamous cell carcinoma diagnosed?  Diagnosis of cutaneous SCC is based on clinical features. The diagnosis and histological subtype are confirmed pathologically by diagnostic biopsy or following excision. See squamous cell carcinoma - pathology.  Patients with high-risk SCC may also undergo staging investigations to determine whether it has spread to lymph nodes or elsewhere. These may include:  Imaging using ultrasound scan, X-rays, CT scans, MRI scans   Lymph node or other tissue biopsies    What is the treatment for cutaneous squamous cell  carcinoma?  Cutaneous SCC is nearly always treated surgically. Most cases are excised with a 3-10 mm margin of normal tissue around a visible tumour. A flap or skin graft may be needed to repair the defect.  Other methods of removal include:  Shave, curettage, and electrocautery for low-risk tumours on trunk and limbs   Aggressive cryotherapy for very small, thin, low-risk tumours   Mohs micrographic surgery for large facial lesions with indistinct margins or recurrent tumours   Radiotherapy for an inoperable tumour, patients unsuitable for surgery, or as adjuvant    What is the treatment for advanced or metastatic squamous cell carcinoma?  Locally advanced primary, recurrent or metastatic SCC requires multidisciplinary consultation. Often a combination of treatments is used.  Surgery   Radiotherapy   Cemiplimab   Experimental targeted therapy using epidermal growth factor receptor inhibitors    How can cutaneous squamous cell carcinoma be prevented?  There is a great deal of evidence to show that very careful sun protection at any time of life reduces the number of SCCs. This is particularly important in ageing, sun-damaged, fair skin; in patients that are immune suppressed; and in those who already have actinic keratoses or previous SCC.  Stay indoors or under the shade in the middle of the day   Wear covering clothing   Apply high protection factor SPF50+ broad-spectrum sunscreens generously to exposed skin if outdoors   Avoid indoor tanning (sun beds, solaria)    Oral nicotinamide (vitamin B3) in a dose of 500 mg twice daily may reduce the number and severity of SCCs in people at high risk.  Patients with multiple squamous cell carcinomas may be prescribed an oral retinoid (acitretin or isotretinoin). These reduce the number of tumours but have some nuisance side effects.    What is the outlook for cutaneous squamous cell carcinoma?  Most SCCs are cured by treatment. A cure is most likely if treatment is  undertaken when the lesion is small. The risk of recurrence or disease-associated death is greater for tumours that are > 20 mm in diameter and/or > 2 mm in thickness at the time of surgical excision.  About 50% of people at high risk of SCC develop a second one within 5 years of the first. They are also at increased risk of other skin cancers, especially melanoma. Regular self-skin examinations and long-term annual skin checks by an experienced health professional are recommended.

## 2024-04-11 ENCOUNTER — TELEPHONE (OUTPATIENT)
Age: 74
End: 2024-04-11

## 2024-04-11 NOTE — TELEPHONE ENCOUNTER
This was ordered under Dr Liu:   Medication:   Alendronate sodium; 70mg  12/1 weekly?    [facility] in [facility] left a vm today.  Some refills possible    Not on medication list  F#: [phone]

## 2024-05-01 DIAGNOSIS — E78.2 MIXED HYPERLIPIDEMIA: ICD-10-CM

## 2024-05-01 RX ORDER — ATORVASTATIN CALCIUM 20 MG/1
TABLET, FILM COATED ORAL
Qty: 90 TABLET | Refills: 0 | Status: SHIPPED | OUTPATIENT
Start: 2024-05-01

## 2024-05-03 ENCOUNTER — TELEPHONE (OUTPATIENT)
Age: 74
End: 2024-05-03

## 2024-05-03 NOTE — TELEPHONE ENCOUNTER
Voice mail message left offering patient 1 year follow up appointment with advanced practitioner in [place] office. Dr. Batista will be retiring but [facility] will be available to see them.

## 2024-06-13 ENCOUNTER — TELEPHONE (OUTPATIENT)
Age: 74
End: 2024-06-13

## 2024-07-10 ENCOUNTER — APPOINTMENT (OUTPATIENT)
Age: 74
End: 2024-07-10
Payer: COMMERCIAL

## 2024-07-10 ENCOUNTER — OFFICE VISIT (OUTPATIENT)
Age: 74
End: 2024-07-10
Payer: COMMERCIAL

## 2024-07-10 VITALS
SYSTOLIC BLOOD PRESSURE: 108 MMHG | WEIGHT: 103.6 LBS | DIASTOLIC BLOOD PRESSURE: 66 MMHG | TEMPERATURE: 96.9 F | OXYGEN SATURATION: 98 % | HEART RATE: 64 BPM | HEIGHT: 61 IN | BODY MASS INDEX: 19.56 KG/M2

## 2024-07-10 DIAGNOSIS — M81.0 OSTEOPOROSIS, POST-MENOPAUSAL: ICD-10-CM

## 2024-07-10 DIAGNOSIS — Z00.00 ENCOUNTER FOR SUBSEQUENT ANNUAL WELLNESS VISIT (AWV) IN MEDICARE PATIENT: ICD-10-CM

## 2024-07-10 DIAGNOSIS — E44.1 MILD PROTEIN-CALORIE MALNUTRITION (HCC): ICD-10-CM

## 2024-07-10 DIAGNOSIS — E78.2 MIXED HYPERLIPIDEMIA: ICD-10-CM

## 2024-07-10 DIAGNOSIS — G43.909 MIGRAINE WITHOUT STATUS MIGRAINOSUS, NOT INTRACTABLE, UNSPECIFIED MIGRAINE TYPE: Primary | ICD-10-CM

## 2024-07-10 DIAGNOSIS — F41.9 ANXIETY: ICD-10-CM

## 2024-07-10 DIAGNOSIS — F51.01 PRIMARY INSOMNIA: ICD-10-CM

## 2024-07-10 LAB
BASOPHILS # BLD AUTO: 0.04 THOUSANDS/ÂΜL (ref 0–0.1)
BASOPHILS NFR BLD AUTO: 1 % (ref 0–1)
EOSINOPHIL # BLD AUTO: 0.11 THOUSAND/ÂΜL (ref 0–0.61)
EOSINOPHIL NFR BLD AUTO: 2 % (ref 0–6)
ERYTHROCYTE [DISTWIDTH] IN BLOOD BY AUTOMATED COUNT: 11.8 % (ref 11.6–15.1)
HCT VFR BLD AUTO: 41 % (ref 34.8–46.1)
HGB BLD-MCNC: 13.9 G/DL (ref 11.5–15.4)
IMM GRANULOCYTES # BLD AUTO: 0.01 THOUSAND/UL (ref 0–0.2)
IMM GRANULOCYTES NFR BLD AUTO: 0 % (ref 0–2)
LYMPHOCYTES # BLD AUTO: 1.69 THOUSANDS/ÂΜL (ref 0.6–4.47)
LYMPHOCYTES NFR BLD AUTO: 37 % (ref 14–44)
MCH RBC QN AUTO: 32.6 PG (ref 26.8–34.3)
MCHC RBC AUTO-ENTMCNC: 33.9 G/DL (ref 31.4–37.4)
MCV RBC AUTO: 96 FL (ref 82–98)
MONOCYTES # BLD AUTO: 0.4 THOUSAND/ÂΜL (ref 0.17–1.22)
MONOCYTES NFR BLD AUTO: 9 % (ref 4–12)
NEUTROPHILS # BLD AUTO: 2.35 THOUSANDS/ÂΜL (ref 1.85–7.62)
NEUTS SEG NFR BLD AUTO: 51 % (ref 43–75)
NRBC BLD AUTO-RTO: 0 /100 WBCS
PLATELET # BLD AUTO: 254 THOUSANDS/UL (ref 149–390)
PMV BLD AUTO: 10.3 FL (ref 8.9–12.7)
RBC # BLD AUTO: 4.27 MILLION/UL (ref 3.81–5.12)
TSH SERPL DL<=0.05 MIU/L-ACNC: 2.99 UIU/ML (ref 0.45–4.5)
WBC # BLD AUTO: 4.6 THOUSAND/UL (ref 4.31–10.16)

## 2024-07-10 PROCEDURE — 80061 LIPID PANEL: CPT

## 2024-07-10 PROCEDURE — 36415 COLL VENOUS BLD VENIPUNCTURE: CPT

## 2024-07-10 PROCEDURE — G0439 PPPS, SUBSEQ VISIT: HCPCS | Performed by: FAMILY MEDICINE

## 2024-07-10 PROCEDURE — 80053 COMPREHEN METABOLIC PANEL: CPT

## 2024-07-10 PROCEDURE — 99214 OFFICE O/P EST MOD 30 MIN: CPT | Performed by: FAMILY MEDICINE

## 2024-07-10 PROCEDURE — 85025 COMPLETE CBC W/AUTO DIFF WBC: CPT

## 2024-07-10 PROCEDURE — 84443 ASSAY THYROID STIM HORMONE: CPT

## 2024-07-10 RX ORDER — ATORVASTATIN CALCIUM 20 MG/1
20 TABLET, FILM COATED ORAL DAILY
Qty: 90 TABLET | Refills: 1 | Status: SHIPPED | OUTPATIENT
Start: 2024-07-10

## 2024-07-10 RX ORDER — TRAZODONE HYDROCHLORIDE 50 MG/1
50 TABLET ORAL
Qty: 90 TABLET | Refills: 0 | Status: SHIPPED | OUTPATIENT
Start: 2024-07-10

## 2024-07-10 NOTE — PROGRESS NOTES
Ambulatory Visit  Name: Dai Julian      : 1950      MRN: [id number]  Encounter Provider: Sunni Guerrero DO  Encounter Date: 7/10/2024   Encounter department: [facility] PRIMARY CARE [place]    Assessment & Plan   1. Migraine without status migrainosus, not intractable, unspecified migraine type- infrequent. Improves with triptan use.   2. Mixed hyperlipidemia- stable with statin.   -     atorvastatin (LIPITOR) 20 mg tablet; Take 1 tablet (20 mg total) by mouth daily  3. Anxiety- stable off of paxil therapy. Pt self weaned off of the medication a few months ago   4. Osteoporosis, post-menopausal- will restart prolia injection in 2 weeks   5. Encounter for subsequent annual wellness visit (AWV) in Medicare patient  6. Mild protein-calorie malnutrition (HCC)- slow improvement. Pt BMI a year ago was 17. Today is 19. Pt encouraged to continue adequate daily caloric intake   7. Primary insomnia- improves with trazodone 25mg qhs. Pt can continue.   -     traZODone (DESYREL) 50 mg tablet; Take 1 tablet (50 mg total) by mouth daily at bedtime       Preventive health issues were discussed with patient, and age appropriate screening tests were ordered as noted in patient's After Visit Summary. Personalized health advice and appropriate referrals for health education or preventive services given if needed, as noted in patient's After Visit Summary.    History of Present Illness     Pt presents for annual. Reviewed hx   Discussed insomnia. Using half of a 50mg tablet for the trazodone and it works well. The full 50mg makes her a bit drowsy        Patient Care Team:  David Jefferson MD as PCP - General (Internal Medicine)  William Batista MD    Review of Systems   Constitutional:  Negative for fever.   Respiratory:  Negative for shortness of breath.    Cardiovascular:  Negative for chest pain.   Gastrointestinal:  Negative for constipation and diarrhea.   Musculoskeletal:  Positive for back pain.    Neurological:  Negative for headaches.   Psychiatric/Behavioral:  Positive for sleep disturbance.      Medical History Reviewed by provider this encounter:       Annual Wellness Visit Questionnaire   Dai is here for her Subsequent Wellness visit. Last Medicare Wellness visit information reviewed, patient interviewed and updates made to the record today.      Health Risk Assessment:   Patient rates overall health as good. Patient feels that their physical health rating is slightly better. Patient is satisfied with their life. Eyesight was rated as same. Hearing was rated as slightly worse. Patient feels that their emotional and mental health rating is same. Patients states they are sometimes angry. Patient states they are sometimes unusually tired/fatigued. Pain experienced in the last 7 days has been none. Patient states that she has experienced no weight loss or gain in last 6 months.     Depression Screening:   PHQ-2 Score: 0      Fall Risk Screening:   In the past year, patient has experienced: no history of falling in past year      Urinary Incontinence Screening:   Patient has not leaked urine accidently in the last six months.     Home Safety:  Patient does not have trouble with stairs inside or outside of their home. Patient has working smoke alarms and has working carbon monoxide detector. Home safety hazards include: none.     Nutrition:   Current diet is Regular.     Medications:   Patient is currently taking over-the-counter supplements. OTC medications include: see medication list. Patient is able to manage medications.     Activities of Daily Living (ADLs)/Instrumental Activities of Daily Living (IADLs):   Walk and transfer into and out of bed and chair?: Yes  Dress and groom yourself?: Yes    Bathe or shower yourself?: Yes    Feed yourself? Yes  Do your laundry/housekeeping?: Yes  Manage your money, pay your bills and track your expenses?: Yes  Make your own meals?: Yes    Do your own shopping?:  Yes    Previous Hospitalizations:   Any hospitalizations or ED visits within the last 12 months?: No      Advance Care Planning:   Living will: Yes    Advanced directive: Yes    Five wishes given: No      PREVENTIVE SCREENINGS      Cardiovascular Screening:    General: Screening Not Indicated and History Lipid Disorder      Diabetes Screening:     General: Screening Current      Colorectal Cancer Screening:     General: Screening Current      Breast Cancer Screening:     General: Screening Current      Cervical Cancer Screening:    General: Screening Not Indicated      Osteoporosis Screening:    General: Screening Not Indicated and History Osteoporosis      Lung Cancer Screening:     General: Screening Not Indicated      Hepatitis C Screening:    General: Screening Current    Screening, Brief Intervention, and Referral to Treatment (SBIRT)    Screening  Typical number of drinks in a day: 0  Typical number of drinks in a week: 1  Interpretation: Low risk drinking behavior.    AUDIT-C Screenin) How often did you have a drink containing alcohol in the past year? 2 to 4 times a month  2) How many drinks did you have on a typical day when you were drinking in the past year? 1 to 2  3) How often did you have 6 or more drinks on one occasion in the past year? never    AUDIT-C Score: 2  Interpretation: Score 0-2 (female): Negative screen for alcohol misuse    Single Item Drug Screening:  How often have you used an illegal drug (including marijuana) or a prescription medication for non-medical reasons in the past year? never    Single Item Drug Screen Score: 0  Interpretation: Negative screen for possible drug use disorder    Social Determinants of Health     Financial Resource Strain: Medium Risk (2023)    Overall Financial Resource Strain (CARDIA)     Difficulty of Paying Living Expenses: Somewhat hard   Transportation Needs: No Transportation Needs (2023)    PRAPARE - Transportation     Lack of  "Transportation (Medical): No     Lack of Transportation (Non-Medical): No     No results found.    Objective     /66   Pulse 64   Temp (!) 96.9 °F (36.1 °C) (Tympanic)   Ht 5' 1.25\" (1.556 m)   Wt 47 kg (103 lb 9.6 oz)   SpO2 98%   BMI 19.42 kg/m²     Physical Exam  HENT:      Head: Normocephalic.      Right Ear: External ear normal.      Left Ear: External ear normal.      Mouth/Throat:      Mouth: Mucous membranes are moist.      Pharynx: Oropharynx is clear.   Eyes:      Conjunctiva/sclera: Conjunctivae normal.      Pupils: Pupils are equal, round, and reactive to light.   Cardiovascular:      Rate and Rhythm: Normal rate and regular rhythm.      Heart sounds: No murmur heard.  Pulmonary:      Effort: Pulmonary effort is normal.      Breath sounds: Normal breath sounds.   Abdominal:      General: Bowel sounds are normal.      Palpations: Abdomen is soft.   Musculoskeletal:      Right lower leg: No edema.      Left lower leg: No edema.   Skin:     Capillary Refill: Capillary refill takes less than 2 seconds.   Neurological:      Mental Status: She is alert and oriented to person, place, and time.      Gait: Gait normal.   Psychiatric:         Mood and Affect: Mood normal.         Behavior: Behavior normal.           "

## 2024-07-11 LAB
ALBUMIN SERPL BCG-MCNC: 4 G/DL (ref 3.5–5)
ALP SERPL-CCNC: 39 U/L (ref 34–104)
ALT SERPL W P-5'-P-CCNC: 19 U/L (ref 7–52)
ANION GAP SERPL CALCULATED.3IONS-SCNC: 12 MMOL/L (ref 4–13)
AST SERPL W P-5'-P-CCNC: 43 U/L (ref 13–39)
BILIRUB SERPL-MCNC: 0.52 MG/DL (ref 0.2–1)
BUN SERPL-MCNC: 17 MG/DL (ref 5–25)
CALCIUM SERPL-MCNC: 9.1 MG/DL (ref 8.4–10.2)
CHLORIDE SERPL-SCNC: 107 MMOL/L (ref 96–108)
CHOLEST SERPL-MCNC: 198 MG/DL
CO2 SERPL-SCNC: 24 MMOL/L (ref 21–32)
CREAT SERPL-MCNC: 0.64 MG/DL (ref 0.6–1.3)
GFR SERPL CREATININE-BSD FRML MDRD: 90 ML/MIN/1.73SQ M
GLUCOSE P FAST SERPL-MCNC: 81 MG/DL (ref 65–99)
HDLC SERPL-MCNC: 95 MG/DL
LDLC SERPL CALC-MCNC: 93 MG/DL (ref 0–100)
NONHDLC SERPL-MCNC: 103 MG/DL
POTASSIUM SERPL-SCNC: 4.1 MMOL/L (ref 3.5–5.3)
PROT SERPL-MCNC: 7.1 G/DL (ref 6.4–8.4)
SODIUM SERPL-SCNC: 143 MMOL/L (ref 135–147)
TRIGL SERPL-MCNC: 48 MG/DL

## 2024-07-29 ENCOUNTER — CLINICAL SUPPORT (OUTPATIENT)
Age: 74
End: 2024-07-29
Payer: COMMERCIAL

## 2024-07-29 DIAGNOSIS — M81.0 OSTEOPOROSIS, POST-MENOPAUSAL: Primary | ICD-10-CM

## 2024-07-29 PROCEDURE — 96372 THER/PROPH/DIAG INJ SC/IM: CPT | Performed by: FAMILY MEDICINE

## 2024-07-30 ENCOUNTER — TELEPHONE (OUTPATIENT)
Age: 74
End: 2024-07-30

## 2024-12-15 DIAGNOSIS — F51.01 PRIMARY INSOMNIA: ICD-10-CM

## 2024-12-16 RX ORDER — TRAZODONE HYDROCHLORIDE 50 MG/1
50 TABLET, FILM COATED ORAL
Qty: 90 TABLET | Refills: 1 | Status: SHIPPED | OUTPATIENT
Start: 2024-12-16

## 2025-01-16 ENCOUNTER — APPOINTMENT (OUTPATIENT)
Age: 75
End: 2025-01-16
Payer: COMMERCIAL

## 2025-01-16 ENCOUNTER — OFFICE VISIT (OUTPATIENT)
Age: 75
End: 2025-01-16
Payer: COMMERCIAL

## 2025-01-16 VITALS
DIASTOLIC BLOOD PRESSURE: 64 MMHG | WEIGHT: 104.2 LBS | HEIGHT: 61 IN | OXYGEN SATURATION: 98 % | SYSTOLIC BLOOD PRESSURE: 110 MMHG | BODY MASS INDEX: 19.67 KG/M2 | RESPIRATION RATE: 17 BRPM | HEART RATE: 62 BPM | TEMPERATURE: 96.6 F

## 2025-01-16 DIAGNOSIS — M81.0 OSTEOPOROSIS, POST-MENOPAUSAL: ICD-10-CM

## 2025-01-16 DIAGNOSIS — E44.1 MILD PROTEIN-CALORIE MALNUTRITION (HCC): ICD-10-CM

## 2025-01-16 DIAGNOSIS — G43.909 MIGRAINE WITHOUT STATUS MIGRAINOSUS, NOT INTRACTABLE, UNSPECIFIED MIGRAINE TYPE: Primary | ICD-10-CM

## 2025-01-16 DIAGNOSIS — H91.93 DECREASED HEARING OF BOTH EARS: Primary | ICD-10-CM

## 2025-01-16 DIAGNOSIS — Z78.0 POSTMENOPAUSAL: ICD-10-CM

## 2025-01-16 DIAGNOSIS — F41.9 ANXIETY: ICD-10-CM

## 2025-01-16 LAB
BASOPHILS # BLD AUTO: 0.04 THOUSANDS/ΜL (ref 0–0.1)
BASOPHILS NFR BLD AUTO: 1 % (ref 0–1)
EOSINOPHIL # BLD AUTO: 0.12 THOUSAND/ΜL (ref 0–0.61)
EOSINOPHIL NFR BLD AUTO: 2 % (ref 0–6)
ERYTHROCYTE [DISTWIDTH] IN BLOOD BY AUTOMATED COUNT: 11.7 % (ref 11.6–15.1)
HCT VFR BLD AUTO: 44.6 % (ref 34.8–46.1)
HGB BLD-MCNC: 14.3 G/DL (ref 11.5–15.4)
IMM GRANULOCYTES # BLD AUTO: 0.02 THOUSAND/UL (ref 0–0.2)
IMM GRANULOCYTES NFR BLD AUTO: 0 % (ref 0–2)
LYMPHOCYTES # BLD AUTO: 2.05 THOUSANDS/ΜL (ref 0.6–4.47)
LYMPHOCYTES NFR BLD AUTO: 34 % (ref 14–44)
MCH RBC QN AUTO: 31.4 PG (ref 26.8–34.3)
MCHC RBC AUTO-ENTMCNC: 32.1 G/DL (ref 31.4–37.4)
MCV RBC AUTO: 98 FL (ref 82–98)
MONOCYTES # BLD AUTO: 0.48 THOUSAND/ΜL (ref 0.17–1.22)
MONOCYTES NFR BLD AUTO: 8 % (ref 4–12)
NEUTROPHILS # BLD AUTO: 3.31 THOUSANDS/ΜL (ref 1.85–7.62)
NEUTS SEG NFR BLD AUTO: 55 % (ref 43–75)
NRBC BLD AUTO-RTO: 0 /100 WBCS
PLATELET # BLD AUTO: 304 THOUSANDS/UL (ref 149–390)
PMV BLD AUTO: 10.5 FL (ref 8.9–12.7)
RBC # BLD AUTO: 4.55 MILLION/UL (ref 3.81–5.12)
WBC # BLD AUTO: 6.02 THOUSAND/UL (ref 4.31–10.16)

## 2025-01-16 PROCEDURE — 80053 COMPREHEN METABOLIC PANEL: CPT

## 2025-01-16 PROCEDURE — 99214 OFFICE O/P EST MOD 30 MIN: CPT | Performed by: FAMILY MEDICINE

## 2025-01-16 PROCEDURE — 36415 COLL VENOUS BLD VENIPUNCTURE: CPT

## 2025-01-16 PROCEDURE — 85025 COMPLETE CBC W/AUTO DIFF WBC: CPT

## 2025-01-16 PROCEDURE — 80061 LIPID PANEL: CPT

## 2025-01-16 RX ORDER — DIPHENHYDRAMINE HYDROCHLORIDE 25 MG/1
TABLET ORAL
COMMUNITY

## 2025-01-16 NOTE — ASSESSMENT & PLAN NOTE
Improved.  BMI now 19.  Encourage patient to continue to work on gaining healthy weight with proteins complex carbohydrates and healthy fats.  Orders:    Comprehensive metabolic panel; Future    Lipid Panel with Direct LDL reflex; Future    CBC and differential; Future

## 2025-01-16 NOTE — PROGRESS NOTES
"Name: Dai Julian      : 1950      MRN: [id number]  Encounter Provider: Sunni Guerrero DO  Encounter Date: 2025   Encounter department: [facility] PRIMARY CARE [place]  :  Assessment & Plan  Mild protein-calorie malnutrition (HCC)  Improved.  BMI now 19.  Encourage patient to continue to work on gaining healthy weight with proteins complex carbohydrates and healthy fats.  Orders:    Comprehensive metabolic panel; Future    Lipid Panel with Direct LDL reflex; Future    CBC and differential; Future    Migraine without status migrainosus, not intractable, unspecified migraine type  Fair control with as needed sumatriptan.  Patient has needed it for over a month.  Patient thinks that with better stress management the headaches have not been as frequent.       Anxiety  Patient continues to manage well off of Paxil therapy.  Will continue to follow       Osteoporosis, post-menopausal  Currently on Prolia injections every 6 months.  Due for up-to-date DEXA this fall.  Order placed see below.       Postmenopausal    Orders:    DXA bone density spine hip and pelvis; Future           History of Present Illness     Patient presents for 6-month follow-up.  Discussed anxiety.  Discussed chronic migraines.      Review of Systems   Constitutional:  Negative for fever.   Respiratory:  Negative for shortness of breath.    Cardiovascular:  Negative for chest pain.   Gastrointestinal:  Negative for constipation and diarrhea.   Neurological:  Positive for headaches.   Psychiatric/Behavioral:  Negative for sleep disturbance. The patient is not nervous/anxious.        Objective   /64 (BP Location: Left arm, Patient Position: Sitting, Cuff Size: Standard)   Pulse 62   Temp (!) 96.6 °F (35.9 °C) (Tympanic)   Resp 17   Ht 5' 1.25\" (1.556 m)   Wt 47.3 kg (104 lb 3.2 oz)   SpO2 98%   BMI 19.53 kg/m²      Physical Exam  HENT:      Head: Normocephalic.      Right Ear: Tympanic membrane and external ear " normal.      Left Ear: Tympanic membrane and external ear normal.   Eyes:      Conjunctiva/sclera: Conjunctivae normal.   Cardiovascular:      Rate and Rhythm: Normal rate and regular rhythm.      Heart sounds: No murmur heard.  Pulmonary:      Effort: Pulmonary effort is normal.      Breath sounds: Normal breath sounds.   Abdominal:      General: Bowel sounds are normal.      Palpations: Abdomen is soft.   Musculoskeletal:      Right lower leg: No edema.      Left lower leg: No edema.   Neurological:      Mental Status: She is alert and oriented to person, place, and time.      Gait: Gait normal.   Psychiatric:         Mood and Affect: Mood normal.         Behavior: Behavior normal.

## 2025-01-16 NOTE — ASSESSMENT & PLAN NOTE
Currently on Prolia injections every 6 months.  Due for up-to-date DEXA this fall.  Order placed see below.

## 2025-01-16 NOTE — ASSESSMENT & PLAN NOTE
Fair control with as needed sumatriptan.  Patient has needed it for over a month.  Patient thinks that with better stress management the headaches have not been as frequent.

## 2025-01-17 ENCOUNTER — RESULTS FOLLOW-UP (OUTPATIENT)
Age: 75
End: 2025-01-17

## 2025-01-17 PROBLEM — K76.89 HEPATIC CYST: Status: ACTIVE | Noted: 2025-01-17

## 2025-01-17 LAB
ALBUMIN SERPL BCG-MCNC: 4.4 G/DL (ref 3.5–5)
ALP SERPL-CCNC: 35 U/L (ref 34–104)
ALT SERPL W P-5'-P-CCNC: 21 U/L (ref 7–52)
ANION GAP SERPL CALCULATED.3IONS-SCNC: 9 MMOL/L (ref 4–13)
AST SERPL W P-5'-P-CCNC: 46 U/L (ref 13–39)
BILIRUB SERPL-MCNC: 0.62 MG/DL (ref 0.2–1)
BUN SERPL-MCNC: 14 MG/DL (ref 5–25)
CALCIUM SERPL-MCNC: 8.4 MG/DL (ref 8.4–10.2)
CHLORIDE SERPL-SCNC: 103 MMOL/L (ref 96–108)
CHOLEST SERPL-MCNC: 113 MG/DL (ref ?–200)
CO2 SERPL-SCNC: 28 MMOL/L (ref 21–32)
CREAT SERPL-MCNC: 0.69 MG/DL (ref 0.6–1.3)
GFR SERPL CREATININE-BSD FRML MDRD: 87 ML/MIN/1.73SQ M
GLUCOSE P FAST SERPL-MCNC: 79 MG/DL (ref 65–99)
HDLC SERPL-MCNC: 80 MG/DL
LDLC SERPL CALC-MCNC: 24 MG/DL (ref 0–100)
POTASSIUM SERPL-SCNC: 4.1 MMOL/L (ref 3.5–5.3)
PROT SERPL-MCNC: 7.7 G/DL (ref 6.4–8.4)
SODIUM SERPL-SCNC: 140 MMOL/L (ref 135–147)
TRIGL SERPL-MCNC: 45 MG/DL (ref ?–150)

## 2025-01-17 NOTE — TELEPHONE ENCOUNTER
----- Message from Sunni Guerrero DO sent at 1/17/2025  9:25 AM EST -----  Please call and notify patient that overall her blood work looks good.  Liver enzymes are stable.  Cholesterol is perfect.  Blood sugar kidney and liver function are all normal

## 2025-01-22 ENCOUNTER — TELEPHONE (OUTPATIENT)
Age: 75
End: 2025-01-22

## 2025-01-22 NOTE — TELEPHONE ENCOUNTER
Called pt to schedule Feb 2025 prolia inj.  Pt stated, although she was not due until Feb, 2025, prolia inj given by Dr. Dior in Dec. 2024.  Pt will call back with exact date for our records.

## 2025-01-29 DIAGNOSIS — E78.2 MIXED HYPERLIPIDEMIA: ICD-10-CM

## 2025-01-29 RX ORDER — ATORVASTATIN CALCIUM 20 MG/1
20 TABLET, FILM COATED ORAL DAILY
Qty: 90 TABLET | Refills: 1 | Status: SHIPPED | OUTPATIENT
Start: 2025-01-29

## 2025-03-24 ENCOUNTER — HOSPITAL ENCOUNTER (OUTPATIENT)
Dept: RADIOLOGY | Facility: HOSPITAL | Age: 75
Discharge: HOME | End: 2025-03-24
Payer: COMMERCIAL

## 2025-03-24 ENCOUNTER — OFFICE VISIT (OUTPATIENT)
Age: 75
End: 2025-03-24
Payer: COMMERCIAL

## 2025-03-24 ENCOUNTER — RESULTS FOLLOW-UP (OUTPATIENT)
Age: 75
End: 2025-03-24

## 2025-03-24 VITALS
OXYGEN SATURATION: 98 % | BODY MASS INDEX: 19.9 KG/M2 | SYSTOLIC BLOOD PRESSURE: 104 MMHG | RESPIRATION RATE: 15 BRPM | WEIGHT: 105.4 LBS | DIASTOLIC BLOOD PRESSURE: 72 MMHG | HEART RATE: 79 BPM | TEMPERATURE: 98.4 F | HEIGHT: 61 IN

## 2025-03-24 DIAGNOSIS — H10.32 ACUTE BACTERIAL CONJUNCTIVITIS OF LEFT EYE: ICD-10-CM

## 2025-03-24 DIAGNOSIS — F51.01 PRIMARY INSOMNIA: ICD-10-CM

## 2025-03-24 DIAGNOSIS — J01.00 ACUTE NON-RECURRENT MAXILLARY SINUSITIS: Primary | ICD-10-CM

## 2025-03-24 DIAGNOSIS — J98.8 RESPIRATORY INFECTION: Primary | ICD-10-CM

## 2025-03-24 DIAGNOSIS — J98.8 RESPIRATORY INFECTION: ICD-10-CM

## 2025-03-24 LAB
SARS-COV-2 AG UPPER RESP QL IA: NEGATIVE
SL AMB POCT RAPID FLU A: NEGATIVE
SL AMB POCT RAPID FLU B: NEGATIVE
VALID CONTROL: NORMAL

## 2025-03-24 PROCEDURE — G2211 COMPLEX E/M VISIT ADD ON: HCPCS | Performed by: FAMILY MEDICINE

## 2025-03-24 PROCEDURE — 71046 X-RAY EXAM CHEST 2 VIEWS: CPT

## 2025-03-24 PROCEDURE — 87811 SARS-COV-2 COVID19 W/OPTIC: CPT | Performed by: FAMILY MEDICINE

## 2025-03-24 PROCEDURE — 87804 INFLUENZA ASSAY W/OPTIC: CPT | Performed by: FAMILY MEDICINE

## 2025-03-24 PROCEDURE — 99214 OFFICE O/P EST MOD 30 MIN: CPT | Performed by: FAMILY MEDICINE

## 2025-03-24 RX ORDER — POLYMYXIN B SULFATE AND TRIMETHOPRIM 1; 10000 MG/ML; [USP'U]/ML
1 SOLUTION OPHTHALMIC EVERY 6 HOURS
Qty: 10 ML | Refills: 0 | Status: SHIPPED | OUTPATIENT
Start: 2025-03-24 | End: 2025-03-31

## 2025-03-24 RX ORDER — TRAZODONE HYDROCHLORIDE 50 MG/1
50 TABLET ORAL
Qty: 90 TABLET | Refills: 1 | Status: SHIPPED | OUTPATIENT
Start: 2025-03-24

## 2025-03-24 NOTE — PROGRESS NOTES
Name: Dai Julian      : 1950      MRN: [id number]  Encounter Provider: Sunni Guerrero DO  Encounter Date: 3/24/2025   Encounter department: [facility] PRIMARY CARE [place]  :  Assessment & Plan  Primary insomnia  Refill provided  Orders:    traZODone (DESYREL) 50 mg tablet; Take 1 tablet (50 mg total) by mouth daily at bedtime as needed for sleep    Acute bacterial conjunctivitis of left eye    Orders:    polymyxin b-trimethoprim (POLYTRIM) ophthalmic solution; Administer 1 drop into the left eye every 6 (six) hours for 7 days    Respiratory infection  Influenza and COVID testing negative.  Will provide antitussive in the meantime.Will r/o PNA via CXR due to significant lower respiratory congestion and productive cough.   Orders:    pseudoephedrine-guaifenesin (TUSSIN PE)  MG/5ML SYRP; Take 5 mL by mouth every 4 (four) hours as needed for cough    XR chest pa and lateral; Future    POCT rapid flu A and B    POCT Rapid Covid Ag           History of Present Illness   Pt presents c/o URI symptoms.     Cough  This is a new problem. The current episode started in the past 7 days. The problem has been gradually worsening. The problem occurs every few minutes. The cough is Productive of brown sputum. Associated symptoms include ear congestion, ear pain, headaches, myalgias, nasal congestion, postnasal drip, rhinorrhea and a sore throat. Pertinent negatives include no chest pain, chills, fever, heartburn, hemoptysis, rash, shortness of breath, sweats, weight loss or wheezing.     Review of Systems   Constitutional:  Negative for chills, fever and weight loss.   HENT:  Positive for congestion, ear pain, postnasal drip, rhinorrhea and sore throat.    Respiratory:  Positive for cough. Negative for hemoptysis, shortness of breath and wheezing.    Cardiovascular:  Negative for chest pain.   Gastrointestinal:  Negative for heartburn.   Musculoskeletal:  Positive for myalgias.   Skin:  Negative for  "rash.   Neurological:  Positive for headaches.       Objective   /72 (BP Location: Left arm, Patient Position: Sitting, Cuff Size: Standard)   Pulse 79   Temp 98.4 °F (36.9 °C) (Tympanic)   Resp 15   Ht 5' 1.25\" (1.556 m)   Wt 47.8 kg (105 lb 6.4 oz)   SpO2 98%   BMI 19.75 kg/m²      Physical Exam  Constitutional:       Appearance: She is ill-appearing.   HENT:      Head: Normocephalic.      Right Ear: No middle ear effusion.      Left Ear:  No middle ear effusion.      Nose: Congestion present.   Eyes:      General:         Right eye: No discharge.         Left eye: Discharge present.     Conjunctiva/sclera:      Right eye: Right conjunctiva is not injected.      Left eye: Left conjunctiva is injected.   Cardiovascular:      Rate and Rhythm: Normal rate and regular rhythm.   Pulmonary:      Effort: Pulmonary effort is normal.      Comments: Probable transmitted upper airway sound in the upper lung fields  Neurological:      Mental Status: She is alert.         "

## 2025-03-24 NOTE — ASSESSMENT & PLAN NOTE
Refill provided  Orders:    traZODone (DESYREL) 50 mg tablet; Take 1 tablet (50 mg total) by mouth daily at bedtime as needed for sleep

## 2025-03-24 NOTE — PROGRESS NOTES
"Name: Dai Julian      : 1950      MRN: [id number]  Encounter Provider: Sunni Guerrero DO  Encounter Date: 3/24/2025   Encounter department: [facility] CARE [place]  :  Assessment & Plan  Primary insomnia         Acute bacterial conjunctivitis of left eye         Respiratory infection                History of Present Illness   Pt reports couchg congestion runny nose. Symptom onset a week ago.   Cough is dry. Unproductive   Conjuctivitis       Review of Systems   Constitutional:  Negative for fever.   HENT:  Positive for congestion, postnasal drip and sore throat.    Eyes:  Positive for discharge and redness.   Respiratory:  Positive for cough and chest tightness. Negative for shortness of breath.    Cardiovascular:  Negative for chest pain.       Objective   /72 (BP Location: Left arm, Patient Position: Sitting, Cuff Size: Standard)   Pulse 79   Temp 98.4 °F (36.9 °C) (Tympanic)   Resp 15   Ht 5' 1.25\" (1.556 m)   Wt 47.8 kg (105 lb 6.4 oz)   SpO2 98%   BMI 19.75 kg/m²      Physical Exam    "

## 2025-06-04 ENCOUNTER — TELEPHONE (OUTPATIENT)
Age: 75
End: 2025-06-04

## 2025-06-11 ENCOUNTER — TELEPHONE (OUTPATIENT)
Age: 75
End: 2025-06-11

## 2025-06-11 DIAGNOSIS — M81.0 OSTEOPOROSIS, POST-MENOPAUSAL: Primary | ICD-10-CM

## 2025-06-11 NOTE — TELEPHONE ENCOUNTER
Spoke with: patient  Re:  Administer prolia at [facility]    Provider's message relayed in full detail.  Comments:    Pt stated that the OOP expense was too high at Dr. Dior's office and requesting to continue treatment here at our office  Pt stated her last inj was 12/15/24.  Prolia inj appt scheduled for 6/24/25
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
## 2025-06-24 ENCOUNTER — CLINICAL SUPPORT (OUTPATIENT)
Age: 75
End: 2025-06-24
Payer: COMMERCIAL

## 2025-06-24 DIAGNOSIS — M81.0 OSTEOPOROSIS, POST-MENOPAUSAL: Primary | ICD-10-CM

## 2025-06-24 PROCEDURE — 96372 THER/PROPH/DIAG INJ SC/IM: CPT

## 2025-06-25 ENCOUNTER — TELEPHONE (OUTPATIENT)
Age: 75
End: 2025-06-25

## 2025-07-08 ENCOUNTER — RA CDI HCC (OUTPATIENT)
Dept: OTHER | Facility: HOSPITAL | Age: 75
End: 2025-07-08

## 2025-07-16 ENCOUNTER — OFFICE VISIT (OUTPATIENT)
Age: 75
End: 2025-07-16
Payer: COMMERCIAL

## 2025-07-16 ENCOUNTER — APPOINTMENT (OUTPATIENT)
Age: 75
End: 2025-07-16
Payer: COMMERCIAL

## 2025-07-16 VITALS
SYSTOLIC BLOOD PRESSURE: 112 MMHG | WEIGHT: 103.8 LBS | DIASTOLIC BLOOD PRESSURE: 62 MMHG | HEIGHT: 61 IN | RESPIRATION RATE: 18 BRPM | OXYGEN SATURATION: 97 % | HEART RATE: 67 BPM | BODY MASS INDEX: 19.6 KG/M2 | TEMPERATURE: 97.5 F

## 2025-07-16 DIAGNOSIS — Z00.00 MEDICARE ANNUAL WELLNESS VISIT, SUBSEQUENT: Primary | ICD-10-CM

## 2025-07-16 DIAGNOSIS — F51.01 PRIMARY INSOMNIA: ICD-10-CM

## 2025-07-16 DIAGNOSIS — D47.2 MGUS (MONOCLONAL GAMMOPATHY OF UNKNOWN SIGNIFICANCE): ICD-10-CM

## 2025-07-16 DIAGNOSIS — M81.0 OSTEOPOROSIS, POST-MENOPAUSAL: ICD-10-CM

## 2025-07-16 DIAGNOSIS — F41.9 ANXIETY: ICD-10-CM

## 2025-07-16 DIAGNOSIS — E78.2 MIXED HYPERLIPIDEMIA: ICD-10-CM

## 2025-07-16 PROBLEM — E44.1 MILD PROTEIN-CALORIE MALNUTRITION (HCC): Status: RESOLVED | Noted: 2024-07-10 | Resolved: 2025-07-16

## 2025-07-16 PROCEDURE — 36415 COLL VENOUS BLD VENIPUNCTURE: CPT

## 2025-07-16 PROCEDURE — 83521 IG LIGHT CHAINS FREE EACH: CPT

## 2025-07-16 PROCEDURE — 86334 IMMUNOFIX E-PHORESIS SERUM: CPT

## 2025-07-16 PROCEDURE — G2211 COMPLEX E/M VISIT ADD ON: HCPCS | Performed by: FAMILY MEDICINE

## 2025-07-16 PROCEDURE — G0439 PPPS, SUBSEQ VISIT: HCPCS | Performed by: FAMILY MEDICINE

## 2025-07-16 PROCEDURE — 99214 OFFICE O/P EST MOD 30 MIN: CPT | Performed by: FAMILY MEDICINE

## 2025-07-16 PROCEDURE — 84165 PROTEIN E-PHORESIS SERUM: CPT

## 2025-07-16 RX ORDER — TRAZODONE HYDROCHLORIDE 50 MG/1
50 TABLET ORAL
Qty: 90 TABLET | Refills: 1 | Status: SHIPPED | OUTPATIENT
Start: 2025-07-16

## 2025-07-16 RX ORDER — ATORVASTATIN CALCIUM 20 MG/1
20 TABLET, FILM COATED ORAL DAILY
Qty: 90 TABLET | Refills: 1 | Status: SHIPPED | OUTPATIENT
Start: 2025-07-16

## 2025-07-16 NOTE — ASSESSMENT & PLAN NOTE
Noted on 11/2023 bone density.  Mostly within the lumbar spine.  Patient has been on Prolia.  Due for up-to-date study

## 2025-07-16 NOTE — PROGRESS NOTES
Name: Dai Julian      : 1950      MRN: [id number]  Encounter Provider: Sunni Guerrero DO  Encounter Date: 2025   Encounter department: [facility] CARE [place]  :  Assessment & Plan  Medicare annual wellness visit, subsequent         Mixed hyperlipidemia  On statin refill provided  Orders:    atorvastatin (LIPITOR) 20 mg tablet; Take 1 tablet (20 mg total) by mouth daily    Primary insomnia  Controlled with sleep aid.  Refill provided  Orders:    traZODone (DESYREL) 50 mg tablet; Take 1 tablet (50 mg total) by mouth daily at bedtime as needed for sleep    Osteoporosis, post-menopausal  Noted on 2023 bone density.  Mostly within the lumbar spine.  Patient has been on Prolia.  Due for up-to-date study       MGUS (monoclonal gammopathy of unknown significance)  Chronic issue.  Previously noted on  laboratory studies.  No follow-up since.  Will obtain up-to-date study for continued monitoring.  If there is a significant change will refer to hematology oncology.  Orders:    Protein electrophoresis, serum; Future    Immunoglobulin free LT chains blood; Future    Anxiety  Stable off of Paxil.  Most of her symptoms present at night.   Using sleep aid to help with this issue.  Not amenable to seeing a behavioral health counselor at this time.          Preventive health issues were discussed with patient, and age appropriate screening tests were ordered as noted in patient's After Visit Summary. Personalized health advice and appropriate referrals for health education or preventive services given if needed, as noted in patient's After Visit Summary.    History of Present Illness     Patient presents for follow-up.  Discussed mental health.  Discussed laboratory studies.  Discussed refills       Patient Care Team:  Sunni Guerrero DO as PCP - General (Family Medicine)  William Batista MD    Review of Systems   Constitutional:  Negative for fever.   Respiratory:  Negative for  shortness of breath.    Cardiovascular:  Negative for chest pain.   Gastrointestinal:  Negative for constipation and diarrhea.   Musculoskeletal:  Positive for arthralgias. Negative for gait problem.   Neurological:  Negative for headaches.   Psychiatric/Behavioral:  Positive for sleep disturbance.      Medical History Reviewed by provider this encounter:       Annual Wellness Visit Questionnaire   Dai is here for her Subsequent Wellness visit.     Health Risk Assessment:   Patient rates overall health as very good. Patient feels that their physical health rating is slightly better. Patient is very satisfied with their life. Eyesight was rated as same. Hearing was rated as same. Patient feels that their emotional and mental health rating is slightly better. Patients states they are never, rarely angry. Patient states they are sometimes unusually tired/fatigued. Pain experienced in the last 7 days has been none. Patient states that she has experienced no weight loss or gain in last 6 months.     Depression Screening:   PHQ-2 Score: 0      Fall Risk Screening:   In the past year, patient has experienced: no history of falling in past year      Urinary Incontinence Screening:   Patient has not leaked urine accidently in the last six months.     Home Safety:  Patient does not have trouble with stairs inside or outside of their home. Patient has working smoke alarms and has working carbon monoxide detector. Home safety hazards include: none.     Nutrition:   Current diet is Regular.     Medications:   Patient is currently taking over-the-counter supplements. OTC medications include: see medication list. Patient is able to manage medications.     Activities of Daily Living (ADLs)/Instrumental Activities of Daily Living (IADLs):   Walk and transfer into and out of bed and chair?: Yes  Dress and groom yourself?: Yes    Bathe or shower yourself?: Yes    Feed yourself? Yes  Do your laundry/housekeeping?: Yes  Manage your  money, pay your bills and track your expenses?: Yes  Make your own meals?: Yes    Do your own shopping?: Yes    Durable Medical Equipment Suppliers  none    Previous Hospitalizations:   Any hospitalizations or ED visits within the last 12 months?: No      Advance Care Planning:   Living will: Yes    Durable POA for healthcare: Yes    Advanced directive: Yes      Preventive Screenings      Cardiovascular Screening:    General: Screening Not Indicated and History Lipid Disorder      Diabetes Screening:     General: Screening Current      Colorectal Cancer Screening:     General: Screening Current      Breast Cancer Screening:     General: Screening Current      Cervical Cancer Screening:    General: Screening Not Indicated      Osteoporosis Screening:    General: Screening Not Indicated and History Osteoporosis      Lung Cancer Screening:     General: Screening Not Indicated      Hepatitis C Screening:    General: Screening Current    Screening, Brief Intervention, and Referral to Treatment (SBIRT)     Screening  Typical number of drinks in a day: 1  Typical number of drinks in a week: 3  Interpretation: Low risk drinking behavior.    AUDIT-C Screenin) How often did you have a drink containing alcohol in the past year? 2 to 4 times a month  2) How many drinks did you have on a typical day when you were drinking in the past year? 1 to 2  3) How often did you have 6 or more drinks on one occasion in the past year? never    AUDIT-C Score: 2  Interpretation: Score 0-2 (female): Negative screen for alcohol misuse    Single Item Drug Screening:  How often have you used an illegal drug (including marijuana) or a prescription medication for non-medical reasons in the past year? never    Single Item Drug Screen Score: 0  Interpretation: Negative screen for possible drug use disorder    Social Drivers of Health     Financial Resource Strain: Medium Risk (2023)    Overall Financial Resource Strain (CARDIDELMIS)      "Difficulty of Paying Living Expenses: Somewhat hard   Food Insecurity: No Food Insecurity (7/14/2025)    Nursing - Inadequate Food Risk Classification     Worried About Running Out of Food in the Last Year: Never true     Ran Out of Food in the Last Year: Never true   Transportation Needs: No Transportation Needs (7/14/2025)    PRAPARE - Transportation     Lack of Transportation (Medical): No     Lack of Transportation (Non-Medical): No   Housing Stability: Low Risk  (7/14/2025)    Housing Stability Vital Sign     Unable to Pay for Housing in the Last Year: No     Number of Times Moved in the Last Year: 0     Homeless in the Last Year: No   Utilities: Not At Risk (7/14/2025)    [facility] Utilities     Threatened with loss of utilities: No     No results found.    Objective   Ht 5' 1.25\" (1.556 m)   BMI 19.75 kg/m²     Physical Exam  HENT:      Head: Normocephalic.      Right Ear: External ear normal.      Left Ear: External ear normal.     Eyes:      Conjunctiva/sclera: Conjunctivae normal.      Pupils: Pupils are equal, round, and reactive to light.       Cardiovascular:      Rate and Rhythm: Normal rate and regular rhythm.      Heart sounds: No murmur heard.  Pulmonary:      Effort: Pulmonary effort is normal.      Breath sounds: Normal breath sounds.   Abdominal:      Palpations: Abdomen is soft.     Musculoskeletal:      Right lower leg: No edema.      Left lower leg: No edema.     Neurological:      Mental Status: She is alert and oriented to person, place, and time.      Gait: Gait normal.     Psychiatric:         Mood and Affect: Mood normal.         Behavior: Behavior normal.         "

## 2025-07-16 NOTE — PATIENT INSTRUCTIONS
Medicare Preventive Visit Patient Instructions  Thank you for completing your Welcome to Medicare Visit or Medicare Annual Wellness Visit today. Your next wellness visit will be due in one year (7/17/2026).  The screening/preventive services that you may require over the next 5-10 years are detailed below. Some tests may not apply to you based off risk factors and/or age. Screening tests ordered at today's visit but not completed yet may show as past due. Also, please note that scanned in results may not display below.  Preventive Screenings:  Service Recommendations Previous Testing/Comments   Colorectal Cancer Screening  * Colonoscopy    * Fecal Occult Blood Test (FOBT)/Fecal Immunochemical Test (FIT)  * Fecal DNA/Cologuard Test  * Flexible Sigmoidoscopy Age: 45-75 years old   Colonoscopy: every 10 years (may be performed more frequently if at higher risk)  OR  FOBT/FIT: every 1 year  OR  Cologuard: every 3 years  OR  Sigmoidoscopy: every 5 years  Screening may be recommended earlier than age 45 if at higher risk for colorectal cancer. Also, an individualized decision between you and your healthcare provider will decide whether screening between the ages of 76-85 would be appropriate. Colonoscopy: 08/25/2022  FOBT/FIT: Not on file  Cologuard: Not on file  Sigmoidoscopy: Not on file    Screening Current     Breast Cancer Screening Age: 40+ years old  Frequency: every 1-2 years  Not required if history of left and right mastectomy Mammogram: 08/27/2024    Screening Current   Cervical Cancer Screening Between the ages of 21-29, pap smear recommended once every 3 years.   Between the ages of 30-65, can perform pap smear with HPV co-testing every 5 years.   Recommendations may differ for women with a history of total hysterectomy, cervical cancer, or abnormal pap smears in past. Pap Smear: Not on file    Screening Not Indicated   Hepatitis C Screening Once for adults born between 1945 and 1965  More frequently in  patients at high risk for Hepatitis C Hep C Antibody: 07/07/2017    Screening Current   Diabetes Screening 1-2 times per year if you're at risk for diabetes or have pre-diabetes Fasting glucose: 79 mg/dL (1/16/2025)  A1C: No results in last 5 years (No results in last 5 years)  Screening Current   Cholesterol Screening Once every 5 years if you don't have a lipid disorder. May order more often based on risk factors. Lipid panel: 01/16/2025    Screening Not Indicated  History Lipid Disorder     Other Preventive Screenings Covered by Medicare:  Abdominal Aortic Aneurysm (AAA) Screening: covered once if your at risk. You're considered to be at risk if you have a family history of AAA.  Lung Cancer Screening: covers low dose CT scan once per year if you meet all of the following conditions: (1) Age 55-77; (2) No signs or symptoms of lung cancer; (3) Current smoker or have quit smoking within the last 15 years; (4) You have a tobacco smoking history of at least 20 pack years (packs per day multiplied by number of years you smoked); (5) You get a written order from a healthcare provider.  Glaucoma Screening: covered annually if you're considered high risk: (1) You have diabetes OR (2) Family history of glaucoma OR (3)  aged 50 and older OR (4)  American aged 65 and older  Osteoporosis Screening: covered every 2 years if you meet one of the following conditions: (1) You're estrogen deficient and at risk for osteoporosis based off medical history and other findings; (2) Have a vertebral abnormality; (3) On glucocorticoid therapy for more than 3 months; (4) Have primary hyperparathyroidism; (5) On osteoporosis medications and need to assess response to drug therapy.   Last bone density test (DXA Scan): 10/18/2021.  HIV Screening: covered annually if you're between the age of 15-65. Also covered annually if you are younger than 15 and older than 65 with risk factors for HIV infection. For pregnant  patients, it is covered up to 3 times per pregnancy.    Immunizations:  Immunization Recommendations   Influenza Vaccine Annual influenza vaccination during flu season is recommended for all persons aged >= 6 months who do not have contraindications   Pneumococcal Vaccine   * Pneumococcal conjugate vaccine = PCV13 (Prevnar 13), PCV15 (Vaxneuvance), PCV20 (Prevnar 20)  * Pneumococcal polysaccharide vaccine = PPSV23 (Pneumovax) Adults 19-65 yo with certain risk factors or if 65+ yo  If never received any pneumonia vaccine: recommend Prevnar 20 (PCV20)  Give PCV20 if previously received 1 dose of PCV13 or PPSV23   Hepatitis B Vaccine 3 dose series if at intermediate or high risk (ex: diabetes, end stage renal disease, liver disease)   Respiratory syncytial virus (RSV) Vaccine - COVERED BY MEDICARE PART D  * RSVPreF3 (Arexvy) CDC recommends that adults 60 years of age and older may receive a single dose of RSV vaccine using shared clinical decision-making (SCDM)   Tetanus (Td) Vaccine - COST NOT COVERED BY MEDICARE PART B Following completion of primary series, a booster dose should be given every 10 years to maintain immunity against tetanus. Td may also be given as tetanus wound prophylaxis.   Tdap Vaccine - COST NOT COVERED BY MEDICARE PART B Recommended at least once for all adults. For pregnant patients, recommended with each pregnancy.   Shingles Vaccine (Shingrix) - COST NOT COVERED BY MEDICARE PART B  2 shot series recommended in those 19 years and older who have or will have weakened immune systems or those 50 years and older     Health Maintenance Due:      Topic Date Due   • DXA SCAN  10/18/2022   • Breast Cancer Screening: Mammogram  08/27/2025   • Colorectal Cancer Screening  08/24/2027   • Hepatitis C Screening  Completed     Immunizations Due:      Topic Date Due   • COVID-19 Vaccine (9 - 2024-25 season) 03/12/2025   • Influenza Vaccine (1) 09/01/2025     Advance Directives   What are advance directives?   Advance directives are legal documents that state your wishes and plans for medical care. These plans are made ahead of time in case you lose your ability to make decisions for yourself. Advance directives can apply to any medical decision, such as the treatments you want, and if you want to donate organs.   What are the types of advance directives?  There are many types of advance directives, and each state has rules about how to use them. You may choose a combination of any of the following:  Living will:  This is a written record of the treatment you want. You can also choose which treatments you do not want, which to limit, and which to stop at a certain time. This includes surgery, medicine, IV fluid, and tube feedings.   Durable power of  for healthcare (DPAHC):  This is a written record that states who you want to make healthcare choices for you when you are unable to make them for yourself. This person, called a proxy, is usually a family member or a friend. You may choose more than 1 proxy.  Do not resuscitate (DNR) order:  A DNR order is used in case your heart stops beating or you stop breathing. It is a request not to have certain forms of treatment, such as CPR. A DNR order may be included in other types of advance directives.  Medical directive:  This covers the care that you want if you are in a coma, near death, or unable to make decisions for yourself. You can list the treatments you want for each condition. Treatment may include pain medicine, surgery, blood transfusions, dialysis, IV or tube feedings, and a ventilator (breathing machine).  Values history:  This document has questions about your views, beliefs, and how you feel and think about life. This information can help others choose the care that you would choose.  Why are advance directives important?  An advance directive helps you control your care. Although spoken wishes may be used, it is better to have your wishes written down.  Spoken wishes can be misunderstood, or not followed. Treatments may be given even if you do not want them. An advance directive may make it easier for your family to make difficult choices about your care.       © Copyright [employer] 2018 Information is for End User's use only and may not be sold, redistributed or otherwise used for commercial purposes. All illustrations and images included in CareNotes® are the copyrighted property of [employer] or [employer]

## 2025-07-16 NOTE — ASSESSMENT & PLAN NOTE
Chronic issue.  Previously noted on 2023 laboratory studies.  No follow-up since.  Will obtain up-to-date study for continued monitoring.  If there is a significant change will refer to hematology oncology.  Orders:    Protein electrophoresis, serum; Future    Immunoglobulin free LT chains blood; Future

## 2025-07-16 NOTE — ASSESSMENT & PLAN NOTE
Controlled with sleep aid.  Refill provided  Orders:    traZODone (DESYREL) 50 mg tablet; Take 1 tablet (50 mg total) by mouth daily at bedtime as needed for sleep

## 2025-07-16 NOTE — ASSESSMENT & PLAN NOTE
On statin refill provided  Orders:    atorvastatin (LIPITOR) 20 mg tablet; Take 1 tablet (20 mg total) by mouth daily

## 2025-07-17 ENCOUNTER — HOSPITAL ENCOUNTER (OUTPATIENT)
Age: 75
Discharge: HOME | End: 2025-07-17
Payer: COMMERCIAL

## 2025-07-17 VITALS — WEIGHT: 103 LBS | HEIGHT: 61 IN | BODY MASS INDEX: 19.45 KG/M2

## 2025-07-17 DIAGNOSIS — Z78.0 POSTMENOPAUSAL: ICD-10-CM

## 2025-07-17 LAB
ALBUMIN SERPL ELPH-MCNC: 4.27 G/DL (ref 3.2–5.1)
ALBUMIN SERPL ELPH-MCNC: 60.2 % (ref 48–70)
ALPHA1 GLOB SERPL ELPH-MCNC: 0.26 G/DL (ref 0.15–0.47)
ALPHA1 GLOB SERPL ELPH-MCNC: 3.6 % (ref 1.8–7)
ALPHA2 GLOB SERPL ELPH-MCNC: 0.66 G/DL (ref 0.42–1.04)
ALPHA2 GLOB SERPL ELPH-MCNC: 9.3 % (ref 5.9–14.9)
BETA GLOB ABNORMAL SERPL ELPH-MCNC: 0.43 G/DL (ref 0.31–0.57)
BETA1 GLOB SERPL ELPH-MCNC: 6 % (ref 4.7–7.7)
BETA2 GLOB SERPL ELPH-MCNC: 6 % (ref 3.1–7.9)
BETA2+GAMMA GLOB SERPL ELPH-MCNC: 0.43 G/DL (ref 0.2–0.58)
GAMMA GLOB ABNORMAL SERPL ELPH-MCNC: 1.06 G/DL (ref 0.4–1.66)
GAMMA GLOB SERPL ELPH-MCNC: 14.9 % (ref 6.9–22.3)
IGG/ALB SER: 1.51 {RATIO} (ref 1.1–1.8)
KAPPA LC FREE SER-MCNC: 16.3 MG/L (ref 3.3–19.4)
KAPPA LC FREE/LAMBDA FREE SER: 0.96 {RATIO} (ref 0.26–1.65)
LAMBDA LC FREE SERPL-MCNC: 17 MG/L (ref 5.7–26.3)
M PROTEIN 1 SERPL ELPH-MCNC: 0.14 G/DL
PROT SERPL-MCNC: 7.1 G/DL (ref 6.4–8.4)

## 2025-07-17 PROCEDURE — 84165 PROTEIN E-PHORESIS SERUM: CPT | Performed by: PATHOLOGY

## 2025-07-17 PROCEDURE — 77080 DXA BONE DENSITY AXIAL: CPT

## 2025-07-17 PROCEDURE — 86334 IMMUNOFIX E-PHORESIS SERUM: CPT | Performed by: PATHOLOGY

## 2025-07-18 ENCOUNTER — RESULTS FOLLOW-UP (OUTPATIENT)
Age: 75
End: 2025-07-18

## 2025-07-18 NOTE — TELEPHONE ENCOUNTER
----- Message from Sunni Guerrero DO sent at 7/18/2025  9:15 AM EDT -----  Follow up labs are normal   ----- Message -----  From: Lab, Background User  Sent: 7/17/2025   2:23 PM EDT  To: Sunni Guerrero DO

## 2025-07-21 NOTE — ASSESSMENT & PLAN NOTE
Stable off of Paxil.  Most of her symptoms present at night.   Using sleep aid to help with this issue.  Not amenable to seeing a behavioral health counselor at this time.